# Patient Record
Sex: FEMALE | Race: WHITE | NOT HISPANIC OR LATINO | Employment: FULL TIME | URBAN - METROPOLITAN AREA
[De-identification: names, ages, dates, MRNs, and addresses within clinical notes are randomized per-mention and may not be internally consistent; named-entity substitution may affect disease eponyms.]

---

## 2019-01-02 ENCOUNTER — OFFICE VISIT (OUTPATIENT)
Dept: OBGYN CLINIC | Facility: CLINIC | Age: 29
End: 2019-01-02
Payer: COMMERCIAL

## 2019-01-02 VITALS
HEIGHT: 67 IN | WEIGHT: 258.1 LBS | BODY MASS INDEX: 40.51 KG/M2 | DIASTOLIC BLOOD PRESSURE: 88 MMHG | SYSTOLIC BLOOD PRESSURE: 140 MMHG

## 2019-01-02 DIAGNOSIS — Z78.9 USES BIRTH CONTROL: ICD-10-CM

## 2019-01-02 DIAGNOSIS — Z01.419 ENCOUNTER FOR GYNECOLOGICAL EXAMINATION (GENERAL) (ROUTINE) WITHOUT ABNORMAL FINDINGS: Primary | ICD-10-CM

## 2019-01-02 PROCEDURE — 99385 PREV VISIT NEW AGE 18-39: CPT | Performed by: OBSTETRICS & GYNECOLOGY

## 2019-01-02 RX ORDER — NORGESTIMATE AND ETHINYL ESTRADIOL 0.25-0.035
1 KIT ORAL DAILY
Qty: 84 TABLET | Refills: 3 | Status: SHIPPED | OUTPATIENT
Start: 2019-01-02 | End: 2019-11-15 | Stop reason: SDUPTHER

## 2019-01-02 NOTE — PROGRESS NOTES
ASSESSMENT & PLAN: Diagnoses and all orders for this visit:    Encounter for gynecological examination (general) (routine) without abnormal findings    Uses birth control  -     norgestimate-ethinyl estradiol (ORTHO-CYCLEN) 0 25-35 MG-MCG per tablet; Take 1 tablet by mouth daily         1  Routine well woman exam done today  2  Pap:  The patient's pap is up to date  Pap was not done today  Current ASCCP Guidelines reviewed  3   STD testing was was not done  4   Refill of previfem  5   The following were reviewed in today's visit: use and side effects of OCPs, adequate intake of calcium and vitamin D, exercise, healthy diet and weight loss  6  F/u in 1 year  CC:  Annual Gynecologic Examination    HPI: Matt Daniel is a 33 y o  G0 who presents for annual gynecologic examination  She has the following concerns:  No complaints  Regular menses  Wants to restart birth control pills  Was on previfem in the past     Health Maintenance:    Patient describes her health as good  The last health maintenance visit was 1 years ago  Patient does have weight concerns  She exercises 4 days per week with pilates/yoga and treadmill 1 hr  She does wear her seatbelt routinely  She does perform regular monthly self breast exams  She does feels safe at home  Patients does follow a healthy diet  Patients gets 2 servings of dairy or calcium rich foods a day  Last pap: 1 year ago     There is no problem list on file for this patient  History reviewed  No pertinent past medical history  Past Surgical History:   Procedure Laterality Date    KNEE SURGERY Right     WISDOM TOOTH EXTRACTION         Past OB/Gyn History:  Pt does not have menstrual issues  History of sexually transmitted infection: No   History of abnormal pap smears: No     Patient is currently sexually active  heterosexual and  monogamous  2 months  The current method of family planning is condoms      Family History   Problem Relation Age of Onset    Breast cancer Mother        Social History:  Social History     Social History    Marital status: Single     Spouse name: N/A    Number of children: N/A    Years of education: N/A     Occupational History    Not on file  Social History Main Topics    Smoking status: Never Smoker    Smokeless tobacco: Never Used    Alcohol use No    Drug use: No    Sexual activity: Yes     Partners: Male     Birth control/ protection: Condom Male     Other Topics Concern    Not on file     Social History Narrative    No narrative on file     Presently lives with parents  Patient is currently employed RN in acute rehab    No Known Allergies      Current Outpatient Prescriptions:     norgestimate-ethinyl estradiol (ORTHO-CYCLEN) 0 25-35 MG-MCG per tablet, Take 1 tablet by mouth daily, Disp: 84 tablet, Rfl: 3      Review of Systems  Constitutional :no fever, feels well, no tiredness, no recent weight gain or loss  ENT: no ear ache, no loss of hearing, no nosebleeds or nasal discharge, no sore throat or hoarseness  Cardiovascular: no complaints of slow or fast heart beat, no chest pain, no palpitations, no leg claudication or lower extremity edema  Respiratory: no complaints of shortness of shortness of breath, no ALVARADO  Breasts:no complaints of breast pain, breast lump, or nipple discharge  Gastrointestinal: no complaints of abdominal pain, constipation, nausea, vomiting, or diarrhea or bloody stools  Genitourinary : no complaints of dysuria, incontinence, pelvic pain, no dysmenorrhea, vaginal discharge or abnormal vaginal bleeding and as noted in HPI  Musculoskeletal: no complaints of arthralgia, no myalgia, no joint swelling or stiffness, no limb pain or swelling    Integumentary: no complaints of skin rash or lesion, itching or dry skin  Neurological: no complaints of headache, no confusion, no numbness or tingling, no dizziness or fainting      Physical Exam:   /88   Ht 5' 7" (1 702 m)   Wt 117 kg (258 lb 1 6 oz)   LMP 12/08/2018   Breastfeeding? No   BMI 40 42 kg/m²     General: appears stated age, cooperative, alert normal mood and affect   Psychiatric oriented to person, place and time  Mood and affect normal   Neck: normal, supple,trachea midline, no masses  Thyroid: normal, no thyromegaly   Heart: regular rate and rhythm, S1, S2 normal, no murmur, click, rub or gallop   Lungs: clear to auscultation bilaterally, no increased work of breathing or signs of respiratory distress   Breasts: normal, no dimpling or skin changes noted   Abdomen: soft, non-tender, without masses or organomegaly   Vulva: normal , no lesions   Vagina: normal , no lesions or dryness   Urethra: normal   Urethal meatus normal   Bladder Normal, soft, non-tender and no prolapse or masses appreciated   Cervix: normal, no palpable masses    Uterus: normal , non-tender, not enlarged, no palpable masses   Adnexa: normal, non-tender without fullness or masses   Lymphatic Palpation of lymph nodes in neck, axilla, groin and/or other locations: no lymphadenopathy or masses noted   Skin Normal skin turgor and no rashes    Palpation of skin and subcutaneous tissue normal

## 2019-08-12 ENCOUNTER — APPOINTMENT (OUTPATIENT)
Dept: LAB | Facility: CLINIC | Age: 29
End: 2019-08-12

## 2019-08-12 ENCOUNTER — TRANSCRIBE ORDERS (OUTPATIENT)
Dept: LAB | Facility: CLINIC | Age: 29
End: 2019-08-12

## 2019-08-12 DIAGNOSIS — Z00.8 ENCOUNTER FOR OTHER GENERAL EXAMINATION: ICD-10-CM

## 2019-08-12 DIAGNOSIS — Z00.8 ENCOUNTER FOR OTHER GENERAL EXAMINATION: Primary | ICD-10-CM

## 2019-08-12 LAB
CHOLEST SERPL-MCNC: 239 MG/DL (ref 50–200)
EST. AVERAGE GLUCOSE BLD GHB EST-MCNC: 97 MG/DL
HBA1C MFR BLD: 5 % (ref 4.2–6.3)
HDLC SERPL-MCNC: 78 MG/DL (ref 40–60)
LDLC SERPL CALC-MCNC: 130 MG/DL (ref 0–100)
NONHDLC SERPL-MCNC: 161 MG/DL
TRIGL SERPL-MCNC: 153 MG/DL

## 2019-08-12 PROCEDURE — 83036 HEMOGLOBIN GLYCOSYLATED A1C: CPT

## 2019-08-12 PROCEDURE — 36415 COLL VENOUS BLD VENIPUNCTURE: CPT

## 2019-08-12 PROCEDURE — 80061 LIPID PANEL: CPT

## 2019-10-11 ENCOUNTER — TELEPHONE (OUTPATIENT)
Dept: OBGYN CLINIC | Facility: MEDICAL CENTER | Age: 29
End: 2019-10-11

## 2019-11-15 ENCOUNTER — TELEPHONE (OUTPATIENT)
Dept: OBGYN CLINIC | Facility: MEDICAL CENTER | Age: 29
End: 2019-11-15

## 2019-11-15 DIAGNOSIS — Z78.9 USES BIRTH CONTROL: ICD-10-CM

## 2019-11-18 RX ORDER — NORGESTIMATE AND ETHINYL ESTRADIOL 0.25-0.035
1 KIT ORAL DAILY
Qty: 84 TABLET | Refills: 0 | Status: SHIPPED | OUTPATIENT
Start: 2019-11-18 | End: 2020-01-17 | Stop reason: SDUPTHER

## 2020-01-17 ENCOUNTER — ANNUAL EXAM (OUTPATIENT)
Dept: OBGYN CLINIC | Facility: CLINIC | Age: 30
End: 2020-01-17
Payer: COMMERCIAL

## 2020-01-17 VITALS
SYSTOLIC BLOOD PRESSURE: 140 MMHG | WEIGHT: 272 LBS | DIASTOLIC BLOOD PRESSURE: 98 MMHG | BODY MASS INDEX: 42.69 KG/M2 | HEIGHT: 67 IN

## 2020-01-17 DIAGNOSIS — Z01.419 ENCOUNTER FOR ROUTINE GYNECOLOGICAL EXAMINATION WITH PAPANICOLAOU SMEAR OF CERVIX: Primary | ICD-10-CM

## 2020-01-17 DIAGNOSIS — Z78.9 USES BIRTH CONTROL: ICD-10-CM

## 2020-01-17 PROCEDURE — G0145 SCR C/V CYTO,THINLAYER,RESCR: HCPCS | Performed by: OBSTETRICS & GYNECOLOGY

## 2020-01-17 PROCEDURE — 99395 PREV VISIT EST AGE 18-39: CPT | Performed by: OBSTETRICS & GYNECOLOGY

## 2020-01-17 RX ORDER — NORGESTIMATE AND ETHINYL ESTRADIOL 0.25-0.035
1 KIT ORAL DAILY
Qty: 84 TABLET | Refills: 3 | Status: SHIPPED | OUTPATIENT
Start: 2020-01-17 | End: 2020-12-15 | Stop reason: SDUPTHER

## 2020-01-17 NOTE — PROGRESS NOTES
ASSESSMENT & PLAN: Diagnoses and all orders for this visit:    Encounter for routine gynecological examination with Papanicolaou smear of cervix    Uses birth control  -     norgestimate-ethinyl estradiol ([id number]) 0 25-35 MG-MCG per tablet; Take 1 tablet by mouth daily         1  Routine well woman exam done today  2  Pap:  The patient's pap is not up to date  Pap was done today  Current ASCCP Guidelines reviewed  3   STD testing was done  Plan to check GC/Chl   4   Patient has had her Gardasil vaccination  Recommendations reviewed  5  The following were reviewed in today's visit: adequate intake of calcium and vitamin D, exercise, weight loss and healthy diet  6  F/u in 1 year for next routine gyn exam   7  Refill current birth control pill sent to pharmacy  CC:  Annual Gynecologic Examination    HPI: Virgilio Louise is a 30 y o  who presents for annual gynecologic examination  She has the following concerns: No issues  Recently went to [facility] on vacation  Health Maintenance:    Patient describes her health as good  The last health maintenance visit was 1 years ago  Patient does have weight concerns  She exercises 4 days per week with pilates/yoga and treadmill 1 hr  She does wear her seatbelt routinely  She does perform regular monthly self breast exams  She does feels safe at home  Patients does follow a healthy diet  Patients gets 2 servings of dairy or calcium rich foods a day  Last pap: 2 years ago     There is no problem list on file for this patient  History reviewed  No pertinent past medical history  Past Surgical History:   Procedure Laterality Date    KNEE SURGERY Right     WISDOM TOOTH EXTRACTION         Past OB/Gyn History:  Pt does not have menstrual issues  On OCP's  History of sexually transmitted infection: No   History of abnormal pap smears: No     Patient is currently sexually active  heterosexual and  monogamous  8 months   The current method of family planning is OCP (estrogen/progesterone) and condoms sometimes  Family History   Problem Relation Age of Onset    Breast cancer Mother        Social History:  Social History     Socioeconomic History    Marital status: Single     Spouse name: Not on file    Number of children: Not on file    Years of education: Not on file    Highest education level: Not on file   Occupational History    Not on file   Social Needs    Financial resource strain: Not on file    Food insecurity:     Worry: Not on file     Inability: Not on file    Transportation needs:     Medical: Not on file     Non-medical: Not on file   Tobacco Use    Smoking status: Never Smoker    Smokeless tobacco: Never Used   Substance and Sexual Activity    Alcohol use: No    Drug use: No    Sexual activity: Yes     Partners: Male     Birth control/protection: Condom Male, OCP   Lifestyle    Physical activity:     Days per week: Not on file     Minutes per session: Not on file    Stress: Not on file   Relationships    Social connections:     Talks on phone: Not on file     Gets together: Not on file     Attends Nondenominational service: Not on file     Active member of club or organization: Not on file     Attends meetings of clubs or organizations: Not on file     Relationship status: Not on file    Intimate partner violence:     Fear of current or ex partner: Not on file     Emotionally abused: Not on file     Physically abused: Not on file     Forced sexual activity: Not on file   Other Topics Concern    Not on file   Social History Narrative    Not on file     Presently lives with parents  Patient is currently employed RN in acute rehab  No Known Allergies      Current Outpatient Medications:     norgestimate-ethinyl estradiol (SPRINTEC 28) 0 25-35 MG-MCG per tablet, Take 1 tablet by mouth daily, Disp: 84 tablet, Rfl: 3      Review of Systems  Constitutional :no fever, feels well, no tiredness, recent weight gain    ENT: no ear ache, no loss of hearing, no nosebleeds or nasal discharge, no sore throat or hoarseness  Cardiovascular: no complaints of slow or fast heart beat, no chest pain, no palpitations, no leg claudication or lower extremity edema  Respiratory: no complaints of shortness of shortness of breath, no ALVARADO  Breasts:no complaints of breast pain, breast lump, or nipple discharge  Gastrointestinal: no complaints of abdominal pain, constipation, nausea, vomiting, or diarrhea or bloody stools  Genitourinary : no complaints of dysuria, incontinence, pelvic pain, no dysmenorrhea, vaginal discharge or abnormal vaginal bleeding and as noted in HPI  Musculoskeletal: no complaints of arthralgia, no myalgia, no joint swelling or stiffness, no limb pain or swelling  Integumentary: no complaints of skin rash or lesion, itching or dry skin  Neurological: no complaints of headache, no confusion, no numbness or tingling, no dizziness or fainting      Physical Exam:   /98   Ht 5' 7" (1 702 m)   Wt 123 kg (272 lb)   LMP 12/27/2019 (Exact Date)   BMI 42 60 kg/m²     General: appears stated age, cooperative, alert normal mood and affect   Psychiatric oriented to person, place and time  Mood and affect normal   Neck: normal, supple,trachea midline, no masses    Thyroid: normal, no thyromegaly   Heart: regular rate and rhythm, S1, S2 normal, no murmur, click, rub or gallop   Lungs: clear to auscultation bilaterally, no increased work of breathing or signs of respiratory distress   Breasts: normal, no dimpling or skin changes noted   Abdomen: soft, non-tender, without masses or organomegaly   Vulva: normal , no lesions   Vagina: normal , no lesions or dryness   Urethra: normal   Urethal meatus normal   Bladder Normal, soft, non-tender and no prolapse or masses appreciated   Cervix: normal, no palpable masses    Uterus: normal , non-tender, not enlarged, no palpable masses   Adnexa: normal, non-tender without fullness or masses Lymphatic Palpation of lymph nodes in neck, axilla, groin and/or other locations: no lymphadenopathy or masses noted   Skin Normal skin turgor and no rashes    Palpation of skin and subcutaneous tissue normal

## 2020-01-17 NOTE — PATIENT INSTRUCTIONS
Thank you for your confidence in our team    We appreciate you and welcome your feedback  If you receive a survey from us, please take a few moments to let us know how we are doing     Sincerely,   Gia Dupree MD

## 2020-01-22 LAB
LAB AP GYN PRIMARY INTERPRETATION: NORMAL
Lab: NORMAL

## 2020-01-24 ENCOUNTER — CLINICAL SUPPORT (OUTPATIENT)
Dept: OBGYN CLINIC | Facility: CLINIC | Age: 30
End: 2020-01-24

## 2020-01-24 DIAGNOSIS — Z11.3 SCREENING FOR STD (SEXUALLY TRANSMITTED DISEASE): Primary | ICD-10-CM

## 2020-01-24 PROCEDURE — 87591 N.GONORRHOEAE DNA AMP PROB: CPT | Performed by: OBSTETRICS & GYNECOLOGY

## 2020-01-24 PROCEDURE — 87491 CHLMYD TRACH DNA AMP PROBE: CPT | Performed by: OBSTETRICS & GYNECOLOGY

## 2020-01-24 NOTE — PROGRESS NOTES
Patient presents to provide urine sample to test for G/C, since it was not able to be run with the pap at the time of her annual exam

## 2020-01-25 LAB
C TRACH DNA SPEC QL NAA+PROBE: NEGATIVE
N GONORRHOEA DNA SPEC QL NAA+PROBE: NEGATIVE

## 2020-12-15 DIAGNOSIS — Z78.9 USES BIRTH CONTROL: ICD-10-CM

## 2020-12-15 RX ORDER — NORGESTIMATE AND ETHINYL ESTRADIOL 0.25-0.035
1 KIT ORAL DAILY
Qty: 84 TABLET | Refills: 0 | Status: SHIPPED | OUTPATIENT
Start: 2020-12-15 | End: 2021-02-17 | Stop reason: SDUPTHER

## 2021-02-17 ENCOUNTER — ANNUAL EXAM (OUTPATIENT)
Dept: OBGYN CLINIC | Facility: CLINIC | Age: 31
End: 2021-02-17
Payer: COMMERCIAL

## 2021-02-17 VITALS
WEIGHT: 266 LBS | HEIGHT: 67 IN | SYSTOLIC BLOOD PRESSURE: 142 MMHG | DIASTOLIC BLOOD PRESSURE: 90 MMHG | BODY MASS INDEX: 41.75 KG/M2

## 2021-02-17 DIAGNOSIS — Z01.419 ENCOUNTER FOR GYNECOLOGICAL EXAMINATION (GENERAL) (ROUTINE) WITHOUT ABNORMAL FINDINGS: Primary | ICD-10-CM

## 2021-02-17 DIAGNOSIS — Z78.9 RUBELLA IMMUNE STATUS NOT KNOWN: ICD-10-CM

## 2021-02-17 DIAGNOSIS — Z78.9 USES BIRTH CONTROL: ICD-10-CM

## 2021-02-17 PROCEDURE — 99395 PREV VISIT EST AGE 18-39: CPT | Performed by: OBSTETRICS & GYNECOLOGY

## 2021-02-17 RX ORDER — NORGESTIMATE AND ETHINYL ESTRADIOL 0.25-0.035
1 KIT ORAL DAILY
Qty: 84 TABLET | Refills: 3 | Status: SHIPPED | OUTPATIENT
Start: 2021-02-17 | End: 2021-11-19

## 2021-02-17 NOTE — PATIENT INSTRUCTIONS
Planning for Pregnancy   WHAT YOU NEED TO KNOW:   Why is it important to plan for pregnancy? There are things you can do to get your body ready for a healthy pregnancy  A healthy pregnancy can improve your chance of having a healthy baby  The steps you need to take and the amount of time needed depends on your current health and habits  Work with your healthcare provider to help you plan a healthy pregnancy  What do I need to know about nutrition and exercise before pregnancy? · Eat a variety of healthy foods  Healthy foods include fruits, vegetables, whole-grain breads, low-fat dairy foods, beans, lean meats, and fish  Limit foods high in sugar, fat, and sodium  Limit your intake of fish to 2 servings each week  Choose fish low in mercury such as canned light tuna, shrimp, salmon, cod, or tilapia  Do not  eat fish high in mercury such as swordfish, tilefish, mony mackerel, and shark  · Take 400 micrograms (mcg) of folic acid each day  This will help to prevent birth defects of the brain and spine such as spina bifida  Most women should take folic acid before pregnancy and up to 12 weeks after getting pregnant  · Exercise for at least 30 minutes, 5 days a week  Some examples of exercise include walking, biking, dancing, and swimming  Include muscle strengthening activities 2 days each week  Regular exercise provides many health benefits  It helps you manage your weight, and decreases your risk for type 2 diabetes, heart disease, stroke, and high blood pressure  Exercise can also help improve your mood  Ask your healthcare provider about the best exercise plan for you  How does weight affect pregnancy? · Obesity  can make it harder for you to get pregnant  It also increases your risk of health problems during pregnancy  Some of these health problems include gestational diabetes, high blood pressure, and infections   It can also increase your baby's risk of health problems such as birth defects  Your baby may also be large and harder to deliver or be born prematurely (early)  Your risk of miscarriage is also higher if you are obese  Work with your healthcare provider to reach a healthy weight before you try to get pregnant  · Being underweight  can also make it hard for you to get pregnant  It can also increase your risk of having a premature baby and miscarriage  Your baby may be born at a low birth weight  What do I need to know about smoking, alcohol, and drugs? · Smoking  increases your risk of a miscarriage and other health problems during pregnancy  Smoking can cause your baby to be born too early or weigh less at birth  Ask your healthcare provider for information if you need help quitting  · Alcohol  passes from your body to your baby through the placenta  It can affect your baby's brain development and cause fetal alcohol syndrome (FAS)  FAS is a group of conditions that causes mental, behavior, and growth problems  Talk to your healthcare provider if you abuse alcohol and need help quitting before pregnancy  · Drugs , such as marijuana and cocaine, should not be used while you are trying to get pregnant or during pregnancy  They increase your risk of problems during pregnancy and increase the risk of having a baby with health problems  These include birth defects, premature birth, and infant death  What do I need to know about medicines and supplements? Tell your healthcare provider about all the medicines and supplements you take  Certain medicines and supplements should not be used during pregnancy  These include over-the-counter medicines, prescription medicines, vitamins, and herbal supplements  He or she may recommend that you take different medicines that are safer during pregnancy  What do I need to know about immunizations? Tell your healthcare provider about all the immunizations you have had   If you have missed any immunizations, your healthcare provider may recommend that you update your immunizations  These include hepatitis B, influenza, MMR (measles, mumps, rubella), Tdap, and varicella immunizations  What tests may I need to have before pregnancy? Your healthcare provider may recommend that you have tests to screen for sexually transmitted infections  These include chlamydia, gonorrhea, herpes, HIV infection, syphilis, and tuberculosis  These infectious diseases should be treated before pregnancy, if needed  What do I need to know about toxic substances? Toxic substances can harm a developing baby  Examples include cleaning products, paints, solvents, pesticides, and other chemical products  They can increase the risk of having a miscarriage, premature birth, and low-birth weight baby  They also increase the risk of developmental delay and childhood cancer  Avoid exposure to toxic substances and materials at work and home  What do I need to know about genetic testing? Tell your healthcare provider about your and your partner's family history of genetic disorders, developmental delays, or other disabilities  Also tell your healthcare provider about any problems you have had in previous pregnancies  Your healthcare provider may recommend that you see a healthcare professional called a genetic counselor  He or she will talk to you about how genes, birth defects, and other medical conditions are passed down  He or she can also tell you about your risk for passing a genetic disease in a future pregnancy  How can I prepare for pregnancy if I have a medical condition? Medical conditions such as diabetes, high blood pressure, asthma, seizure disorders, and thyroid disorders should be managed before pregnancy  Mental health conditions, such as depression and anxiety, should also be treated  This will decrease your risk of having health problems during pregnancy  It will also decrease your baby's risk of medical problems   Medicines used to treat certain conditions are not safe to use during pregnancy and may need to be changed before you get pregnant  Ask your healthcare provider if it is safe for you to get pregnant if you have a medical condition  CARE AGREEMENT:   You have the right to help plan your care  Learn about your health condition and how it may be treated  Discuss treatment options with your healthcare providers to decide what care you want to receive  You always have the right to refuse treatment  The above information is an  only  It is not intended as medical advice for individual conditions or treatments  Talk to your doctor, nurse or pharmacist before following any medical regimen to see if it is safe and effective for you  © Copyright [address] Information is for End User's use only and may not be sold, redistributed or otherwise used for commercial purposes   All illustrations and images included in CareNotes® are the copyrighted property of [employer]  or [address] [place] [address]

## 2021-02-17 NOTE — PROGRESS NOTES
ASSESSMENT & PLAN: Diagnoses and all orders for this visit:    Encounter for gynecological examination (general) (routine) without abnormal findings    Uses birth control  -     norgestimate-ethinyl estradiol (Sprintec 28) 0 25-35 MG-MCG per tablet; Take 1 tablet by mouth daily    Rubella immune status not known  -     Rubella antibody, IgG; Future         1  Routine well woman exam done today  2  Pap:  The patient's pap is up to date  Pap was not done today  Current ASCCP Guidelines reviewed  3   STD testing was not done  Tested in 2020  4   Patient has had her Gardasil vaccination  Recommendations reviewed  5  The following were reviewed in today's visit: adequate intake of calcium and vitamin D, exercise and healthy diet  6  F/u in 1 year for next routine gyn exam   7  Discussed general prepregnancy recommendations  Pt to get Rubella IgG drawn  Will also start charting menses once she stops her bcp's and starts trying to conceive  CC:  Annual Gynecologic Examination    HPI: Snehal Barnett is a 35 y o  who presents for annual gynecologic examination  She has the following concerns: No issues  Is getting  in October  Has lost some weight with intermittent fasting  Is going to start new diet macros and portion control diet  Is considering getting pregnant after wedding  Health Maintenance:    Patient describes her health as good  The last health maintenance visit was 1 years ago  Patient does have weight concerns  She hasn't been able to exercise at the gym because of COVID; tries to stay active  She does wear her seatbelt routinely  She does perform regular monthly self breast exams  She does feels safe at home  Patients does follow a healthy diet  Patients gets 2 servings of dairy or calcium rich foods a day  Last pap: 1 year ago     There is no problem list on file for this patient  History reviewed  No pertinent past medical history      Past Surgical History:   Procedure Laterality Date    KNEE SURGERY Right     WISDOM TOOTH EXTRACTION         Past OB/Gyn History:  Pt does not have menstrual issues  On OCP's  History of sexually transmitted infection: No   History of abnormal pap smears: No     Patient is currently sexually active  heterosexual  The current method of family planning is OCP (estrogen/progesterone)  Family History   Problem Relation Age of Onset    Breast cancer Mother        Social History:  Social History     Socioeconomic History    Marital status: Single     Spouse name: Not on file    Number of children: Not on file    Years of education: Not on file    Highest education level: Not on file   Occupational History    Not on file   Social Needs    Financial resource strain: Not on file    Food insecurity     Worry: Not on file     Inability: Not on file    Transportation needs     Medical: Not on file     Non-medical: Not on file   Tobacco Use    Smoking status: Never Smoker    Smokeless tobacco: Never Used   Substance and Sexual Activity    Alcohol use: No    Drug use: No    Sexual activity: Yes     Partners: Male   Lifestyle    Physical activity     Days per week: Not on file     Minutes per session: Not on file    Stress: Not on file   Relationships    Social connections     Talks on phone: Not on file     Gets together: Not on file     Attends Confucianist service: Not on file     Active member of club or organization: Not on file     Attends meetings of clubs or organizations: Not on file     Relationship status: Not on file    Intimate partner violence     Fear of current or ex partner: Not on file     Emotionally abused: Not on file     Physically abused: Not on file     Forced sexual activity: Not on file   Other Topics Concern    Not on file   Social History Narrative    Not on file     Presently lives with inez, recently moved into a new house    Patient is currently employed RN in acute rehab    No Known Allergies      Current Outpatient Medications:     norgestimate-ethinyl estradiol (Sprintec 28) 0 25-35 MG-MCG per tablet, Take 1 tablet by mouth daily, Disp: 84 tablet, Rfl: 3      Review of Systems  Constitutional :no fever, feels well, no tiredness, no recent weight gain or loss  ENT: no ear ache, no loss of hearing, no nosebleeds or nasal discharge, no sore throat or hoarseness  Cardiovascular: no complaints of slow or fast heart beat, no chest pain, no palpitations, no leg claudication or lower extremity edema  Respiratory: no complaints of shortness of shortness of breath, no ALVARADO  Breasts:no complaints of breast pain, breast lump, or nipple discharge  Gastrointestinal: no complaints of abdominal pain, constipation, nausea, vomiting, or diarrhea or bloody stools  Genitourinary : no complaints of dysuria, incontinence, pelvic pain, no dysmenorrhea, vaginal discharge or abnormal vaginal bleeding and as noted in HPI  Musculoskeletal: no complaints of arthralgia, no myalgia, no joint swelling or stiffness, no limb pain or swelling  Integumentary: no complaints of skin rash or lesion, itching or dry skin  Neurological: no complaints of headache, no confusion, no numbness or tingling, no dizziness or fainting      Physical Exam:   /90   Ht 5' 7" (1 702 m)   Wt 121 kg (266 lb)   LMP 02/02/2021   BMI 41 66 kg/m²     General: appears stated age, cooperative, alert normal mood and affect   Psychiatric oriented to person, place and time  Mood and affect normal   Neck: normal, supple,trachea midline, no masses    Thyroid: normal, no thyromegaly   Heart: regular rate and rhythm, S1, S2 normal, no murmur, click, rub or gallop   Lungs: clear to auscultation bilaterally, no increased work of breathing or signs of respiratory distress   Breasts: normal, no dimpling or skin changes noted   Abdomen: soft, non-tender, without masses or organomegaly   Vulva: normal , no lesions   Vagina: normal , no lesions or dryness   Urethra: normal   Urethal meatus normal   Bladder Normal, soft, non-tender and no prolapse or masses appreciated   Cervix: normal, no palpable masses    Uterus: normal , non-tender, not enlarged, no palpable masses   Adnexa: normal, non-tender without fullness or masses   Lymphatic Palpation of lymph nodes in neck, axilla, groin and/or other locations: no lymphadenopathy or masses noted   Skin Normal skin turgor and no rashes    Palpation of skin and subcutaneous tissue normal

## 2021-02-19 ENCOUNTER — LAB (OUTPATIENT)
Dept: LAB | Facility: HOSPITAL | Age: 31
End: 2021-02-19
Payer: COMMERCIAL

## 2021-02-19 DIAGNOSIS — Z78.9 RUBELLA IMMUNE STATUS NOT KNOWN: ICD-10-CM

## 2021-02-19 LAB — RUBV IGG SERPL IA-ACNC: 98 IU/ML

## 2021-02-19 PROCEDURE — 86762 RUBELLA ANTIBODY: CPT

## 2021-02-19 PROCEDURE — 36415 COLL VENOUS BLD VENIPUNCTURE: CPT

## 2021-03-12 ENCOUNTER — TELEMEDICINE (OUTPATIENT)
Dept: FAMILY MEDICINE CLINIC | Facility: CLINIC | Age: 31
End: 2021-03-12
Payer: COMMERCIAL

## 2021-03-12 DIAGNOSIS — Z20.822 SUSPECTED COVID-19 VIRUS INFECTION: Primary | ICD-10-CM

## 2021-03-12 PROCEDURE — 99441 PR PHYS/QHP TELEPHONE EVALUATION 5-10 MIN: CPT | Performed by: FAMILY MEDICINE

## 2021-03-12 NOTE — PROGRESS NOTES
COVID-19 Virtual Visit     Assessment/Plan:    Problem List Items Addressed This Visit        Other    Suspected COVID-19 virus infection - Primary     Symptom Start: 3/11/2021  Potential Isolation Completion Date: 3/21/2021    - Order COVID swab, recommend testing on 3/15/2021 as that will be the 5th day since symptom onset  - Recommend increased PO hydration  - Tylenol PRN  - ED precautions         Relevant Orders    Novel Coronavirus (Covid-19),PCR [facility] - Collected at [facility] or [facility]         Disposition:     I recommended self-quarantine for 10 days and to watch for symptoms until 14 days after exposure  If patient were to develop symptoms, they should self isolate and call our office for further guidance  I referred patient to one of our centralized sites for a COVID-19 swab  Symptom Start: 3/11/2021  Potential Isolation Completion Date: 3/21/2021    - Order COVID swab  - Recommend increased PO hydration  - Tylenol PRN  - ED precautions    I have spent 8 minutes directly with the patient  Greater than 50% of this time was spent in counseling/coordination of care regarding: instructions for management  Encounter provider Lorenzo Sullivan MD    Provider located at [address] [place] [address]  [phone]    Recent Visits  No visits were found meeting these conditions  Showing recent visits within past 7 days and meeting all other requirements     Today's Visits  Date Type Provider Dept   03/12/21 Telemedicine Lorenzo Sullivan MD [address] today's visits and meeting all other requirements     Future Appointments  No visits were found meeting these conditions  Showing future appointments within next 150 days and meeting all other requirements        Patient agrees to participate in a virtual check in via telephone or video visit instead of presenting to the office to address urgent/immediate medical needs  Patient is aware this is a billable service  After connecting through Telephone, the patient was identified by name and date of birth  Katiana Rowland was informed that this was a telemedicine visit and that the exam was being conducted confidentially over secure lines  My office door was closed  No one else was in the room  Zee Putnam acknowledged consent and understanding of privacy and security of the telemedicine visit  I informed the patient that I have reviewed her record in Epic and presented the opportunity for her to ask any questions regarding the visit today  The patient agreed to participate  Subjective:   Katiana Rowland is a 35 y o  female who is concerned about COVID-19  Patient's symptoms include sore throat and cough  Patient denies fever, fatigue, congestion, rhinorrhea, anosmia, loss of taste, shortness of breath, abdominal pain, nausea, vomiting, diarrhea and headaches  Date of symptom onset: 3/11/2021    Exposure:   Contact with a person who is under investigation (PUI) for or who is positive for COVID-19 within the last 14 days?: Yes    Hospitalized recently for fever and/or lower respiratory symptoms?: No      Currently a healthcare worker that is involved in direct patient care?: Yes      Works in a special setting where the risk of COVID-19 transmission may be high? (this may include long-term care, correctional and assisted facilities; homeless shelters; assisted-living facilities and group homes ): No      Resident in a special setting where the risk of COVID-19 transmission may be high? (this may include long-term care, correctional and assisted facilities; homeless shelters; assisted-living facilities and group homes ): No      Patient presents via telemedicine for above mentioned symptoms  [facility] recently tested positive for COVID  No results found for: Lis Makcenzie Scale  No past medical history on file    Past Surgical History:   Procedure Laterality Date    KNEE SURGERY Right     WISDOM TOOTH EXTRACTION       Current Outpatient Medications   Medication Sig Dispense Refill    norgestimate-ethinyl estradiol (Sprintec 28) 0 25-35 MG-MCG per tablet Take 1 tablet by mouth daily 84 tablet 3     No current facility-administered medications for this visit  No Known Allergies    Review of Systems   Constitutional: Negative for fatigue and fever  HENT: Positive for sore throat  Negative for congestion and rhinorrhea  Respiratory: Positive for cough  Negative for shortness of breath  Gastrointestinal: Negative for abdominal pain, diarrhea, nausea and vomiting  Neurological: Negative for headaches  Objective: There were no vitals filed for this visit  Physical Exam   Patient was able to speak in complete sentences without additional effort  VIRTUAL VISIT DISCLAIMER    Zee Disla acknowledges that she has consented to an online visit or consultation  She understands that the online visit is based solely on information provided by her, and that, in the absence of a face-to-face physical evaluation by the physician, the diagnosis she receives is both limited and provisional in terms of accuracy and completeness  This is not intended to replace a full medical face-to-face evaluation by the physician  Zee Disla understands and accepts these terms

## 2021-03-12 NOTE — ASSESSMENT & PLAN NOTE
Symptom Start: 3/11/2021  Potential Isolation Completion Date: 3/21/2021    - Order COVID swab, recommend testing on 3/15/2021 as that will be the 5th day since symptom onset  - Recommend increased PO hydration  - Tylenol PRN  - ED precautions

## 2021-03-15 DIAGNOSIS — Z20.822 SUSPECTED COVID-19 VIRUS INFECTION: ICD-10-CM

## 2021-03-15 PROCEDURE — U0005 INFEC AGEN DETEC AMPLI PROBE: HCPCS | Performed by: FAMILY MEDICINE

## 2021-03-15 PROCEDURE — U0003 INFECTIOUS AGENT DETECTION BY NUCLEIC ACID (DNA OR RNA); SEVERE ACUTE RESPIRATORY SYNDROME CORONAVIRUS 2 (SARS-COV-2) (CORONAVIRUS DISEASE [COVID-19]), AMPLIFIED PROBE TECHNIQUE, MAKING USE OF HIGH THROUGHPUT TECHNOLOGIES AS DESCRIBED BY CMS-2020-01-R: HCPCS | Performed by: FAMILY MEDICINE

## 2021-03-17 LAB — SARS-COV-2 RNA RESP QL NAA+PROBE: POSITIVE

## 2021-03-19 ENCOUNTER — TELEMEDICINE (OUTPATIENT)
Dept: FAMILY MEDICINE CLINIC | Facility: CLINIC | Age: 31
End: 2021-03-19
Payer: COMMERCIAL

## 2021-03-19 DIAGNOSIS — U07.1 COVID-19 VIRUS INFECTION: Primary | ICD-10-CM

## 2021-03-19 PROCEDURE — 99441 PR PHYS/QHP TELEPHONE EVALUATION 5-10 MIN: CPT | Performed by: FAMILY MEDICINE

## 2021-03-19 NOTE — LETTER
March 19, 2021     Patient: Viridiana Fairchild   YOB: 1990   Date of Visit: 3/19/2021       To Whom it May Concern:    Americaedmond Brunner is under my professional care  She was seen in my office on 3/19/2021  She may return to work on 3/22/2021  If you have any questions or concerns, please don't hesitate to call           Sincerely,          Lucila Landers MD        CC: No Recipients

## 2021-03-19 NOTE — PROGRESS NOTES
COVID-19 Virtual Visit     Assessment/Plan:    Problem List Items Addressed This Visit        Other    COVID-19 virus infection - Primary         Disposition:     I recommended continued isolation until at least 24 hours have passed since recovery defined as resolution of fever without the use of fever-reducing medications AND improvement in COVID symptoms AND 10 days have passed since onset of symptoms (or 10 days have passed since date of first positive viral diagnostic test for asymptomatic patients)  Symptom start: 3/11/21  Isolation discontinuation: 3/22/21    - Work note given to return 3/22 as long as she continues to do well and meets above criteria  - Increase PO hydration    I have spent 6 minutes directly with the patient  Greater than 50% of this time was spent in counseling/coordination of care regarding: instructions for management  Encounter provider Marvin Suarez MD    Provider located at [address] [place] [id number] [phone]    Recent Visits  Date Type Provider Dept   03/12/21 Telemedicine Marvin Suarez, [address] recent visits within past 7 days and meeting all other requirements     Today's Visits  Date Type Provider Dept   03/19/21 Telemedicine Marvin Suarez MD [address] today's visits and meeting all other requirements     Future Appointments  No visits were found meeting these conditions  Showing future appointments within next 150 days and meeting all other requirements        Patient agrees to participate in a virtual check in via telephone or video visit instead of presenting to the office to address urgent/immediate medical needs  Patient is aware this is a billable service  After connecting through Telephone, the patient was identified by name and date of birth   Aparna Stark was informed that this was a telemedicine visit and that the exam was being conducted confidentially over secure lines  My office door was closed  No one else was in the room  Zee Davenport acknowledged consent and understanding of privacy and security of the telemedicine visit  I informed the patient that I have reviewed her record in Epic and presented the opportunity for her to ask any questions regarding the visit today  The patient agreed to participate  It was my intent to perform this visit via video technology but the patient was not able to do a video connection so the visit was completed via audio telephone only  Subjective:   Rufino Fowler is a 35 y o  female who has been screened for COVID-19  Symptom change since last report: resolving  Patient's symptoms include cough  Patient denies fever, fatigue, rhinorrhea, sore throat, anosmia, loss of taste, shortness of breath, nausea, diarrhea, myalgias and headaches  Zee has been staying home and has isolated themselves in her home  She is taking care to not share personal items and is cleaning all surfaces that are touched often, like counters, tabletops, and doorknobs using household cleaning sprays or wipes  She is wearing a mask when she leaves her room  Date of symptom onset: 3/11/2021  Date of positive COVID-19 PCR: 3/15/2021    Patient states she is doing much better  Still has a slight cough  Denies fevers of SOB  Requires work note to return 3/22  Lab Results   Component Value Date    SARSCOV2 Positive (A) 03/15/2021     No past medical history on file  Past Surgical History:   Procedure Laterality Date    KNEE SURGERY Right     WISDOM TOOTH EXTRACTION       Current Outpatient Medications   Medication Sig Dispense Refill    norgestimate-ethinyl estradiol (Sprintec 28) 0 25-35 MG-MCG per tablet Take 1 tablet by mouth daily 84 tablet 3     No current facility-administered medications for this visit        No Known Allergies    Review of Systems   Constitutional: Negative for fatigue and fever    HENT: Negative for rhinorrhea and sore throat  Respiratory: Positive for cough  Negative for shortness of breath  Gastrointestinal: Negative for diarrhea and nausea  Musculoskeletal: Negative for myalgias  Neurological: Negative for headaches  Objective: There were no vitals filed for this visit  Physical Exam   Patient was able to speak in full sentences without additional effort  VIRTUAL VISIT DISCLAIMER    Zee He acknowledges that she has consented to an online visit or consultation  She understands that the online visit is based solely on information provided by her, and that, in the absence of a face-to-face physical evaluation by the physician, the diagnosis she receives is both limited and provisional in terms of accuracy and completeness  This is not intended to replace a full medical face-to-face evaluation by the physician  Zee He understands and accepts these terms

## 2021-03-31 DIAGNOSIS — Z23 ENCOUNTER FOR IMMUNIZATION: ICD-10-CM

## 2021-05-27 ENCOUNTER — APPOINTMENT (OUTPATIENT)
Dept: LAB | Facility: HOSPITAL | Age: 31
End: 2021-05-27
Payer: COMMERCIAL

## 2021-05-27 ENCOUNTER — TRANSCRIBE ORDERS (OUTPATIENT)
Dept: LAB | Facility: HOSPITAL | Age: 31
End: 2021-05-27

## 2021-05-27 DIAGNOSIS — Z00.8 HEALTH EXAMINATION IN POPULATION SURVEY: Primary | ICD-10-CM

## 2021-05-27 DIAGNOSIS — Z00.8 HEALTH EXAMINATION IN POPULATION SURVEY: ICD-10-CM

## 2021-05-27 LAB
CHOLEST SERPL-MCNC: 236 MG/DL (ref 0–200)
EST. AVERAGE GLUCOSE BLD GHB EST-MCNC: 100 MG/DL
HBA1C MFR BLD: 5.1 %
HDLC SERPL-MCNC: 80 MG/DL
LDLC SERPL CALC-MCNC: 139 MG/DL (ref 0–100)
NONHDLC SERPL-MCNC: 156 MG/DL
TRIGL SERPL-MCNC: 84 MG/DL (ref 44–166)

## 2021-05-27 PROCEDURE — 80061 LIPID PANEL: CPT

## 2021-05-27 PROCEDURE — 36415 COLL VENOUS BLD VENIPUNCTURE: CPT

## 2021-05-27 PROCEDURE — 83036 HEMOGLOBIN GLYCOSYLATED A1C: CPT

## 2021-06-12 ENCOUNTER — IMMUNIZATIONS (OUTPATIENT)
Dept: FAMILY MEDICINE CLINIC | Facility: HOSPITAL | Age: 31
End: 2021-06-12

## 2021-06-12 DIAGNOSIS — Z23 ENCOUNTER FOR IMMUNIZATION: Primary | ICD-10-CM

## 2021-06-12 PROCEDURE — 0001A SARS-COV-2 / COVID-19 MRNA VACCINE (PFIZER-BIONTECH) 30 MCG: CPT

## 2021-06-12 PROCEDURE — 91300 SARS-COV-2 / COVID-19 MRNA VACCINE (PFIZER-BIONTECH) 30 MCG: CPT

## 2021-07-06 ENCOUNTER — IMMUNIZATIONS (OUTPATIENT)
Dept: FAMILY MEDICINE CLINIC | Facility: HOSPITAL | Age: 31
End: 2021-07-06

## 2021-07-06 DIAGNOSIS — Z23 ENCOUNTER FOR IMMUNIZATION: Primary | ICD-10-CM

## 2021-07-06 PROCEDURE — 0002A SARS-COV-2 / COVID-19 MRNA VACCINE (PFIZER-BIONTECH) 30 MCG: CPT

## 2021-07-06 PROCEDURE — 91300 SARS-COV-2 / COVID-19 MRNA VACCINE (PFIZER-BIONTECH) 30 MCG: CPT

## 2021-08-20 ENCOUNTER — OFFICE VISIT (OUTPATIENT)
Dept: FAMILY MEDICINE CLINIC | Facility: CLINIC | Age: 31
End: 2021-08-20
Payer: COMMERCIAL

## 2021-08-20 VITALS
WEIGHT: 243.8 LBS | HEIGHT: 67 IN | SYSTOLIC BLOOD PRESSURE: 120 MMHG | HEART RATE: 127 BPM | DIASTOLIC BLOOD PRESSURE: 84 MMHG | OXYGEN SATURATION: 99 % | BODY MASS INDEX: 38.27 KG/M2

## 2021-08-20 DIAGNOSIS — Z00.00 ANNUAL PHYSICAL EXAM: Primary | ICD-10-CM

## 2021-08-20 PROCEDURE — 99395 PREV VISIT EST AGE 18-39: CPT | Performed by: NURSE PRACTITIONER

## 2021-08-20 NOTE — PROGRESS NOTES
ADULT ANNUAL [address] PRIMARY CARE    NAME: Rebekah Bernstein  AGE: 28 y o  SEX: female  : 1990     DATE: 2021     Assessment and Plan:     Problem List Items Addressed This Visit     None      Visit Diagnoses     Annual physical exam    -  Primary    BMI 38 0-38 9,adult              Immunizations and preventive care screenings were discussed with patient today  Appropriate education was printed on patient's after visit summary  Counseling:  · Exercise: the importance of regular exercise/physical activity was discussed  Recommend exercise 3-5 times per week for at least 30 minutes  BMI Counseling: Body mass index is 38 18 kg/m²  The BMI is above normal  Nutrition recommendations include encouraging healthy choices of fruits and vegetables  Exercise recommendations include moderate physical activity 150 minutes/week  Reviewed previously drawn blood work, discussed elevated cholesterol levels - her triglyceride levels have improved since 2019 - has started a new diet recently and has lost some weight  Return in 1 year (on 2022)  Chief Complaint:     Chief Complaint   Patient presents with    Annual Exam     NP      History of Present Illness:     Adult Annual Physical   Patient here for a comprehensive physical exam  The patient reports no problems  Diet and Physical Activity  · Diet/Nutrition: well balanced diet  · Exercise: moderate cardiovascular exercise  Depression Screening  PHQ-9 Depression Screening    PHQ-9:   Frequency of the following problems over the past two weeks:      Little interest or pleasure in doing things: 0 - not at all  Feeling down, depressed, or hopeless: 0 - not at all  PHQ-2 Score: 0       General Health  · Sleep: sleeps well  · Hearing: normal - bilateral   · Vision: no vision problems, most recent eye exam >1 year ago and wears glasses  · Dental: regular dental visits  /GYN Health  · Last menstrual period: monthly  · Contraceptive method: n/a   · History of STDs?: no      Review of Systems:     Review of Systems   Constitutional: Negative  Respiratory: Negative  Cardiovascular: Negative  Neurological: Negative  All other systems reviewed and are negative  Past Medical History:     History reviewed  No pertinent past medical history  Past Surgical History:     Past Surgical History:   Procedure Laterality Date    KNEE SURGERY Right     WISDOM TOOTH EXTRACTION        Social History:     Social History     Socioeconomic History    Marital status: Single     Spouse name: None    Number of children: None    Years of education: None    Highest education level: None   Occupational History    None   Tobacco Use    Smoking status: Never Smoker    Smokeless tobacco: Never Used   Vaping Use    Vaping Use: Never used   Substance and Sexual Activity    Alcohol use: No    Drug use: No    Sexual activity: Yes     Partners: Male   Other Topics Concern    None   Social History Narrative    None     Social Determinants of Health     Financial Resource Strain:     Difficulty of Paying Living Expenses:    Food Insecurity:     Worried About Running Out of Food in the Last Year:     Ran Out of Food in the Last Year:    Transportation Needs:     Lack of Transportation (Medical):      Lack of Transportation (Non-Medical):    Physical Activity:     Days of Exercise per Week:     Minutes of Exercise per Session:    Stress:     Feeling of Stress :    Social Connections:     Frequency of Communication with Friends and Family:     Frequency of Social Gatherings with Friends and Family:     Attends Restorationism Services:     Active Member of Clubs or Organizations:     Attends Club or Organization Meetings:     Marital Status:    Intimate Partner Violence:     Fear of Current or Ex-Partner:     Emotionally Abused:     Physically Abused:     Sexually Abused: Family History:     Family History   Problem Relation Age of Onset    Breast cancer Mother       Current Medications:     Current Outpatient Medications   Medication Sig Dispense Refill    norgestimate-ethinyl estradiol (Sprintec 28) 0 25-35 MG-MCG per tablet Take 1 tablet by mouth daily 84 tablet 3     No current facility-administered medications for this visit  Allergies:     No Known Allergies   Physical Exam:     /84 (BP Location: Right arm, Patient Position: Sitting, Cuff Size: Large)   Pulse (!) 127   Ht 5' 7" (1 702 m)   Wt 111 kg (243 lb 12 8 oz)   SpO2 99%   BMI 38 18 kg/m²     Physical Exam  Vitals and nursing note reviewed  Constitutional:       Appearance: Normal appearance  HENT:      Head: Normocephalic and atraumatic  Eyes:      Extraocular Movements: Extraocular movements intact  Conjunctiva/sclera: Conjunctivae normal       Pupils: Pupils are equal, round, and reactive to light  Cardiovascular:      Rate and Rhythm: Normal rate and regular rhythm  Heart sounds: No murmur heard  No gallop  Pulmonary:      Effort: Pulmonary effort is normal  No respiratory distress  Breath sounds: Normal breath sounds  No wheezing or rales  Musculoskeletal:         General: Normal range of motion  Neurological:      General: No focal deficit present  Mental Status: She is alert and oriented to person, place, and time  Mental status is at baseline  Psychiatric:         Mood and Affect: Mood normal          Behavior: Behavior normal          Thought Content:  Thought content normal          Judgment: Judgment normal           OPHELIA Smith   [facility] PRIMARY CARE

## 2021-08-20 NOTE — PATIENT INSTRUCTIONS
You may receive a survey in the mail, or by e-mail, please fill it out COMPLETELY, and let us know how we did! Please remember to sign up for your iRewardChart soham to check you lab results, send us messages, and schedule appointments  If you have questions about the iRewardChart option, please call us! Thank you again for choosing [facility]! Wellness Visit for Adults   AMBULATORY CARE:   A wellness visit  is when you see your healthcare provider to get screened for health problems  Your healthcare provider will also give you advice on how to stay healthy  Write down your questions so you remember to ask them  Ask your healthcare provider how often you should have a wellness visit  What happens at a wellness visit:  Your healthcare provider will ask about your health, and your family history of health problems  This includes high blood pressure, heart disease, and cancer  He or she will ask if you have symptoms that concern you, if you smoke, and about your mood  You may also be asked about your intake of medicines, supplements, food, and alcohol  Any of the following may be done:  · Your weight  will be checked  Your height may also be checked so your body mass index (BMI) can be calculated  Your BMI shows if you are at a healthy weight  · Your blood pressure  and heart rate will be checked  Your temperature may also be checked  · Blood and urine tests  may be done  Blood tests may be done to check your cholesterol levels  Abnormal cholesterol levels increase your risk for heart disease and stroke  You may also need a blood or urine test to check for diabetes if you are at increased risk  Urine tests may be done to look for signs of an infection or kidney disease  · A physical exam  includes checking your heartbeat and lungs with a stethoscope  Your healthcare provider may also check your skin to look for sun damage  · Screening tests  may be recommended   A screening test is done to check for diseases that may not cause symptoms  The screening tests you may need depend on your age, gender, family history, and lifestyle habits  For example, colorectal screening may be recommended if you are 48years old or older  Screening tests you need if you are a woman:   · A Pap smear  is used to screen for cervical cancer  Pap smears are usually done every 3 to 5 years depending on your age  You may need them more often if you have had abnormal Pap smear test results in the past  Ask your healthcare provider how often you should have a Pap smear  · A mammogram  is an x-ray of your breasts to screen for breast cancer  Experts recommend mammograms every 2 years starting at age 48 years  You may need a mammogram at age 52 years or younger if you have an increased risk for breast cancer  Talk to your healthcare provider about when you should start having mammograms and how often you need them  Vaccines you may need:   · Get an influenza vaccine  every year  The influenza vaccine protects you from the flu  Several types of viruses cause the flu  The viruses change over time, so new vaccines are made each year  · Get a tetanus-diphtheria (Td) booster vaccine  every 10 years  This vaccine protects you against tetanus and diphtheria  Tetanus is a severe infection that may cause painful muscle spasms and lockjaw  Diphtheria is a severe bacterial infection that causes a thick covering in the back of your mouth and throat  · Get a human papillomavirus (HPV) vaccine  if you are female and aged 23 to 32 or male 23 to 24 and never received it  This vaccine protects you from HPV infection  HPV is the most common infection spread by sexual contact  HPV may also cause vaginal, penile, and anal cancers  · Get a pneumococcal vaccine  if you are aged 72 years or older  The pneumococcal vaccine is an injection given to protect you from pneumococcal disease   Pneumococcal disease is an infection caused by pneumococcal bacteria  The infection may cause pneumonia, meningitis, or an ear infection  · Get a shingles vaccine  if you are 60 or older, even if you have had shingles before  The shingles vaccine is an injection to protect you from the varicella-zoster virus  This is the same virus that causes chickenpox  Shingles is a painful rash that develops in people who had chickenpox or have been exposed to the virus  How to eat healthy:  My Plate is a model for planning healthy meals  It shows the types and amounts of foods that should go on your plate  Fruits and vegetables make up about half of your plate, and grains and protein make up the other half  A serving of dairy is included on the side of your plate  The amount of calories and serving sizes you need depends on your age, gender, weight, and height  Examples of healthy foods are listed below:  · Eat a variety of vegetables  such as dark green, red, and orange vegetables  You can also include canned vegetables low in sodium (salt) and frozen vegetables without added butter or sauces  · Eat a variety of fresh fruits , canned fruit in 100% juice, frozen fruit, and dried fruit  · Include whole grains  At least half of the grains you eat should be whole grains  Examples include whole-wheat bread, wheat pasta, brown rice, and whole-grain cereals such as oatmeal     · Eat a variety of protein foods such as seafood (fish and shellfish), lean meat, and poultry without skin (turkey and chicken)  Examples of lean meats include pork leg, shoulder, or tenderloin, and beef round, sirloin, tenderloin, and extra lean ground beef  Other protein foods include eggs and egg substitutes, beans, peas, soy products, nuts, and seeds  · Choose low-fat dairy products such as skim or 1% milk or low-fat yogurt, cheese, and cottage cheese  · Limit unhealthy fats  such as butter, hard margarine, and shortening       Exercise:  Exercise at least 30 minutes per day on most days of the week  Some examples of exercise include walking, biking, dancing, and swimming  You can also fit in more physical activity by taking the stairs instead of the elevator or parking farther away from stores  Include muscle strengthening activities 2 days each week  Regular exercise provides many health benefits  It helps you manage your weight, and decreases your risk for type 2 diabetes, heart disease, stroke, and high blood pressure  Exercise can also help improve your mood  Ask your healthcare provider about the best exercise plan for you  General health and safety guidelines:   · Do not smoke  Nicotine and other chemicals in cigarettes and cigars can cause lung damage  Ask your healthcare provider for information if you currently smoke and need help to quit  E-cigarettes or smokeless tobacco still contain nicotine  Talk to your healthcare provider before you use these products  · Limit alcohol  A drink of alcohol is 12 ounces of beer, 5 ounces of wine, or 1½ ounces of liquor  · Lose weight, if needed  Being overweight increases your risk of certain health conditions  These include heart disease, high blood pressure, type 2 diabetes, and certain types of cancer  · Protect your skin  Do not sunbathe or use tanning beds  Use sunscreen with a SPF 15 or higher  Apply sunscreen at least 15 minutes before you go outside  Reapply sunscreen every 2 hours  Wear protective clothing, hats, and sunglasses when you are outside  · Drive safely  Always wear your seatbelt  Make sure everyone in your car wears a seatbelt  A seatbelt can save your life if you are in an accident  Do not use your cell phone when you are driving  This could distract you and cause an accident  Pull over if you need to make a call or send a text message  · Practice safe sex  Use latex condoms if are sexually active and have more than one partner   Your healthcare provider may recommend screening tests for sexually transmitted infections (STIs)  · Wear helmets, lifejackets, and protective gear  Always wear a helmet when you ride a bike or motorcycle, go skiing, or play sports that could cause a head injury  Wear protective equipment when you play sports  Wear a lifejacket when you are on a boat or doing water sports  © Copyright [employer] 2021 Information is for End User's use only and may not be sold, redistributed or otherwise used for commercial purposes  All illustrations and images included in CareNotes® are the copyrighted property of [employer]  or [facility] Emily Sood   The above information is an  only  It is not intended as medical advice for individual conditions or treatments  Talk to your doctor, nurse or pharmacist before following any medical regimen to see if it is safe and effective for you

## 2021-11-10 ENCOUNTER — HOSPITAL ENCOUNTER (EMERGENCY)
Facility: HOSPITAL | Age: 31
Discharge: HOME | End: 2021-11-10
Attending: EMERGENCY MEDICINE | Admitting: EMERGENCY MEDICINE
Payer: COMMERCIAL

## 2021-11-10 ENCOUNTER — APPOINTMENT (EMERGENCY)
Dept: ULTRASOUND IMAGING | Facility: HOSPITAL | Age: 31
End: 2021-11-10
Payer: COMMERCIAL

## 2021-11-10 ENCOUNTER — APPOINTMENT (EMERGENCY)
Dept: CT IMAGING | Facility: HOSPITAL | Age: 31
End: 2021-11-10
Payer: COMMERCIAL

## 2021-11-10 VITALS
TEMPERATURE: 97.3 F | DIASTOLIC BLOOD PRESSURE: 102 MMHG | WEIGHT: 250 LBS | RESPIRATION RATE: 18 BRPM | BODY MASS INDEX: 39.24 KG/M2 | OXYGEN SATURATION: 100 % | HEIGHT: 67 IN | SYSTOLIC BLOOD PRESSURE: 146 MMHG | HEART RATE: 98 BPM

## 2021-11-10 DIAGNOSIS — D72.829 LEUKOCYTOSIS: ICD-10-CM

## 2021-11-10 DIAGNOSIS — K80.20 CALCULUS OF GALLBLADDER WITHOUT CHOLECYSTITIS WITHOUT OBSTRUCTION: ICD-10-CM

## 2021-11-10 DIAGNOSIS — K52.9 GASTROENTERITIS: ICD-10-CM

## 2021-11-10 DIAGNOSIS — R10.11 RUQ ABDOMINAL PAIN: ICD-10-CM

## 2021-11-10 DIAGNOSIS — M54.50 ACUTE BILATERAL LOW BACK PAIN WITHOUT SCIATICA: Primary | ICD-10-CM

## 2021-11-10 DIAGNOSIS — D13.5 ADENOMYOMATOSIS OF GALLBLADDER: ICD-10-CM

## 2021-11-10 LAB
ALBUMIN SERPL BCP-MCNC: 4.2 G/DL (ref 3.5–5)
ALP SERPL-CCNC: 103 U/L (ref 46–116)
ALT SERPL W P-5'-P-CCNC: 41 U/L (ref 12–78)
ANION GAP SERPL CALCULATED.3IONS-SCNC: 10 MMOL/L (ref 4–13)
AST SERPL W P-5'-P-CCNC: 20 U/L (ref 5–45)
BASOPHILS # BLD AUTO: 0.07 THOUSANDS/ΜL (ref 0–0.1)
BASOPHILS NFR BLD AUTO: 0 % (ref 0–1)
BILIRUB SERPL-MCNC: 0.31 MG/DL (ref 0.2–1)
BILIRUB UR QL STRIP: NEGATIVE
BUN SERPL-MCNC: 9 MG/DL (ref 5–25)
CALCIUM SERPL-MCNC: 10.1 MG/DL (ref 8.3–10.1)
CHLORIDE SERPL-SCNC: 99 MMOL/L (ref 100–108)
CLARITY UR: CLEAR
CO2 SERPL-SCNC: 27 MMOL/L (ref 21–32)
COLOR UR: YELLOW
CREAT SERPL-MCNC: 0.7 MG/DL (ref 0.6–1.3)
EOSINOPHIL # BLD AUTO: 0.07 THOUSAND/ΜL (ref 0–0.61)
EOSINOPHIL NFR BLD AUTO: 0 % (ref 0–6)
ERYTHROCYTE [DISTWIDTH] IN BLOOD BY AUTOMATED COUNT: 13.4 % (ref 11.6–15.1)
EXT PREG TEST URINE: NEGATIVE
EXT. CONTROL ED NAV: NORMAL
GFR SERPL CREATININE-BSD FRML MDRD: 117 ML/MIN/1.73SQ M
GLUCOSE SERPL-MCNC: 113 MG/DL (ref 65–140)
GLUCOSE UR STRIP-MCNC: NEGATIVE MG/DL
HCT VFR BLD AUTO: 44.1 % (ref 34.8–46.1)
HGB BLD-MCNC: 14.5 G/DL (ref 11.5–15.4)
HGB UR QL STRIP.AUTO: NEGATIVE
IMM GRANULOCYTES # BLD AUTO: 0.08 THOUSAND/UL (ref 0–0.2)
IMM GRANULOCYTES NFR BLD AUTO: 1 % (ref 0–2)
KETONES UR STRIP-MCNC: NEGATIVE MG/DL
LEUKOCYTE ESTERASE UR QL STRIP: NEGATIVE
LIPASE SERPL-CCNC: 147 U/L (ref 73–393)
LYMPHOCYTES # BLD AUTO: 1.57 THOUSANDS/ΜL (ref 0.6–4.47)
LYMPHOCYTES NFR BLD AUTO: 9 % (ref 14–44)
MCH RBC QN AUTO: 29.4 PG (ref 26.8–34.3)
MCHC RBC AUTO-ENTMCNC: 32.9 G/DL (ref 31.4–37.4)
MCV RBC AUTO: 89 FL (ref 82–98)
MONOCYTES # BLD AUTO: 0.67 THOUSAND/ΜL (ref 0.17–1.22)
MONOCYTES NFR BLD AUTO: 4 % (ref 4–12)
NEUTROPHILS # BLD AUTO: 14.37 THOUSANDS/ΜL (ref 1.85–7.62)
NEUTS SEG NFR BLD AUTO: 86 % (ref 43–75)
NITRITE UR QL STRIP: NEGATIVE
NRBC BLD AUTO-RTO: 0 /100 WBCS
PH UR STRIP.AUTO: 6 [PH]
PLATELET # BLD AUTO: 422 THOUSANDS/UL (ref 149–390)
PMV BLD AUTO: 9 FL (ref 8.9–12.7)
POTASSIUM SERPL-SCNC: 3.7 MMOL/L (ref 3.5–5.3)
PROT SERPL-MCNC: 9.3 G/DL (ref 6.4–8.2)
PROT UR STRIP-MCNC: NEGATIVE MG/DL
RBC # BLD AUTO: 4.94 MILLION/UL (ref 3.81–5.12)
SODIUM SERPL-SCNC: 136 MMOL/L (ref 136–145)
SP GR UR STRIP.AUTO: <=1.005 (ref 1–1.03)
UROBILINOGEN UR QL STRIP.AUTO: 0.2 E.U./DL
WBC # BLD AUTO: 16.83 THOUSAND/UL (ref 4.31–10.16)

## 2021-11-10 PROCEDURE — 99284 EMERGENCY DEPT VISIT MOD MDM: CPT

## 2021-11-10 PROCEDURE — 83690 ASSAY OF LIPASE: CPT | Performed by: EMERGENCY MEDICINE

## 2021-11-10 PROCEDURE — 76705 ECHO EXAM OF ABDOMEN: CPT

## 2021-11-10 PROCEDURE — 80053 COMPREHEN METABOLIC PANEL: CPT | Performed by: EMERGENCY MEDICINE

## 2021-11-10 PROCEDURE — 96375 TX/PRO/DX INJ NEW DRUG ADDON: CPT

## 2021-11-10 PROCEDURE — 96361 HYDRATE IV INFUSION ADD-ON: CPT

## 2021-11-10 PROCEDURE — 96374 THER/PROPH/DIAG INJ IV PUSH: CPT

## 2021-11-10 PROCEDURE — 74177 CT ABD & PELVIS W/CONTRAST: CPT

## 2021-11-10 PROCEDURE — 85025 COMPLETE CBC W/AUTO DIFF WBC: CPT | Performed by: EMERGENCY MEDICINE

## 2021-11-10 PROCEDURE — 99285 EMERGENCY DEPT VISIT HI MDM: CPT | Performed by: EMERGENCY MEDICINE

## 2021-11-10 PROCEDURE — G1004 CDSM NDSC: HCPCS

## 2021-11-10 PROCEDURE — 81003 URINALYSIS AUTO W/O SCOPE: CPT | Performed by: EMERGENCY MEDICINE

## 2021-11-10 PROCEDURE — 81025 URINE PREGNANCY TEST: CPT | Performed by: EMERGENCY MEDICINE

## 2021-11-10 PROCEDURE — 36415 COLL VENOUS BLD VENIPUNCTURE: CPT | Performed by: EMERGENCY MEDICINE

## 2021-11-10 RX ORDER — ONDANSETRON 2 MG/ML
4 INJECTION INTRAMUSCULAR; INTRAVENOUS ONCE
Status: COMPLETED | OUTPATIENT
Start: 2021-11-10 | End: 2021-11-10

## 2021-11-10 RX ORDER — ONDANSETRON 4 MG/1
4 TABLET, ORALLY DISINTEGRATING ORAL EVERY 6 HOURS PRN
Qty: 20 TABLET | Refills: 0 | Status: SHIPPED | OUTPATIENT
Start: 2021-11-10 | End: 2021-11-19

## 2021-11-10 RX ORDER — KETOROLAC TROMETHAMINE 30 MG/ML
15 INJECTION, SOLUTION INTRAMUSCULAR; INTRAVENOUS ONCE
Status: COMPLETED | OUTPATIENT
Start: 2021-11-10 | End: 2021-11-10

## 2021-11-10 RX ORDER — DICYCLOMINE HCL 20 MG
20 TABLET ORAL 2 TIMES DAILY
Qty: 20 TABLET | Refills: 0 | Status: SHIPPED | OUTPATIENT
Start: 2021-11-10 | End: 2021-11-19

## 2021-11-10 RX ADMIN — ONDANSETRON 4 MG: 2 INJECTION INTRAMUSCULAR; INTRAVENOUS at 02:03

## 2021-11-10 RX ADMIN — KETOROLAC TROMETHAMINE 15 MG: 30 INJECTION, SOLUTION INTRAMUSCULAR at 02:03

## 2021-11-10 RX ADMIN — SODIUM CHLORIDE 1000 ML: 0.9 INJECTION, SOLUTION INTRAVENOUS at 02:02

## 2021-11-10 RX ADMIN — IOHEXOL 100 ML: 350 INJECTION, SOLUTION INTRAVENOUS at 02:59

## 2021-11-11 ENCOUNTER — OFFICE VISIT (OUTPATIENT)
Dept: FAMILY MEDICINE CLINIC | Facility: CLINIC | Age: 31
End: 2021-11-11
Payer: COMMERCIAL

## 2021-11-11 VITALS — SYSTOLIC BLOOD PRESSURE: 144 MMHG | HEART RATE: 124 BPM | DIASTOLIC BLOOD PRESSURE: 100 MMHG

## 2021-11-11 DIAGNOSIS — K80.20 GALL STONES: Primary | ICD-10-CM

## 2021-11-11 DIAGNOSIS — R10.13 EPIGASTRIC PAIN: ICD-10-CM

## 2021-11-11 PROCEDURE — 99213 OFFICE O/P EST LOW 20 MIN: CPT | Performed by: NURSE PRACTITIONER

## 2021-11-11 RX ORDER — PANTOPRAZOLE SODIUM 20 MG/1
20 TABLET, DELAYED RELEASE ORAL DAILY
Qty: 30 TABLET | Refills: 1 | Status: SHIPPED | OUTPATIENT
Start: 2021-11-11 | End: 2021-12-27

## 2021-11-16 ENCOUNTER — TELEPHONE (OUTPATIENT)
Dept: FAMILY MEDICINE CLINIC | Facility: CLINIC | Age: 31
End: 2021-11-16

## 2021-11-17 DIAGNOSIS — R10.13 EPIGASTRIC PAIN: Primary | ICD-10-CM

## 2021-11-17 DIAGNOSIS — K80.20 GALL STONES: ICD-10-CM

## 2021-11-17 RX ORDER — HYDROCODONE BITARTRATE AND ACETAMINOPHEN 5; 325 MG/1; MG/1
1 TABLET ORAL EVERY 6 HOURS PRN
Qty: 20 TABLET | Refills: 0 | Status: SHIPPED | OUTPATIENT
Start: 2021-11-17 | End: 2021-11-19

## 2021-11-19 ENCOUNTER — OFFICE VISIT (OUTPATIENT)
Dept: FAMILY MEDICINE CLINIC | Facility: CLINIC | Age: 31
End: 2021-11-19
Payer: COMMERCIAL

## 2021-11-19 VITALS
DIASTOLIC BLOOD PRESSURE: 82 MMHG | TEMPERATURE: 97.8 F | HEART RATE: 112 BPM | BODY MASS INDEX: 38.99 KG/M2 | WEIGHT: 248.4 LBS | HEIGHT: 67 IN | SYSTOLIC BLOOD PRESSURE: 142 MMHG | OXYGEN SATURATION: 99 %

## 2021-11-19 DIAGNOSIS — Z01.30 BLOOD PRESSURE CHECK: ICD-10-CM

## 2021-11-19 DIAGNOSIS — K80.20 GALL STONES: Primary | ICD-10-CM

## 2021-11-19 PROBLEM — U07.1 COVID-19 VIRUS INFECTION: Status: RESOLVED | Noted: 2021-03-19 | Resolved: 2021-11-19

## 2021-11-19 PROBLEM — Z20.822 SUSPECTED COVID-19 VIRUS INFECTION: Status: RESOLVED | Noted: 2021-03-12 | Resolved: 2021-11-19

## 2021-11-19 PROCEDURE — 99213 OFFICE O/P EST LOW 20 MIN: CPT | Performed by: NURSE PRACTITIONER

## 2021-11-26 ENCOUNTER — CONSULT (OUTPATIENT)
Dept: SURGERY | Facility: CLINIC | Age: 31
End: 2021-11-26
Payer: COMMERCIAL

## 2021-11-26 VITALS
SYSTOLIC BLOOD PRESSURE: 140 MMHG | TEMPERATURE: 96.9 F | HEIGHT: 67 IN | HEART RATE: 111 BPM | WEIGHT: 245 LBS | DIASTOLIC BLOOD PRESSURE: 82 MMHG | BODY MASS INDEX: 38.45 KG/M2

## 2021-11-26 DIAGNOSIS — K80.20 CHOLELITHIASIS: ICD-10-CM

## 2021-11-26 DIAGNOSIS — K80.20 GALL STONES: ICD-10-CM

## 2021-11-26 DIAGNOSIS — K80.20 CALCULUS OF GALLBLADDER WITHOUT CHOLECYSTITIS WITHOUT OBSTRUCTION: Primary | ICD-10-CM

## 2021-11-26 DIAGNOSIS — Z01.818 PRE-OP TESTING: Primary | ICD-10-CM

## 2021-11-26 DIAGNOSIS — R10.13 EPIGASTRIC PAIN: ICD-10-CM

## 2021-11-26 PROCEDURE — 99243 OFF/OP CNSLTJ NEW/EST LOW 30: CPT | Performed by: SURGERY

## 2021-11-26 NOTE — H&P (VIEW-ONLY)
Assessment/Plan:   Reyna Luong is a 28 y  o female who is here for Gallbladder disease         Upon evaluation today patient has: Chronic Cholecystitis      Plan: Laparoscopic Cholecystectomy with possible Intraoperative Cholangiogram  Possible Robotic assisted      Post Op Pain Management: Norco      Ultrasound findings: 19 cm liver, gallstones with no wall thickening     discussed hepatomegaly  Encouraged weight loss  Preoperative Clearance: None          Images:         ____________________________________________________    HPI:  Reyna Luong is a 28 y  o female who was referred for evaluation of Diagnoses of Gall stones and Epigastric pain were pertinent to this visit  Abdominal pain Location: in the RUQ with radiation to right back, which is Intermittent  and over 1 month     no nausea and no vomiting,     regular bowel movement       Duration of pain or symptoms: over 1 month and no pain       Prior Colonoscopy : None     Prior Abdominal Surgery: none    Anticoagulation: none    Imaging:   No orders to display           LABS:    Lab Results   Component Value Date    K 3 7 11/10/2021    CL 99 (L) 11/10/2021    CO2 27 11/10/2021    BUN 9 11/10/2021    CREATININE 0 70 11/10/2021    CALCIUM 10 1 11/10/2021    AST 20 11/10/2021    ALT 41 11/10/2021    ALKPHOS 103 11/10/2021    EGFR 117 11/10/2021       Lab Results   Component Value Date    WBC 16 83 (H) 11/10/2021    HGB 14 5 11/10/2021    HCT 44 1 11/10/2021    MCV 89 11/10/2021     (H) 11/10/2021     No results found for: INR, PROTIME  Lab Results   Component Value Date    HGBA1C 5 1 05/27/2021           ROS:  General ROS: negative for - chills, fatigue, fever or night sweats, weight loss  Respiratory ROS: no cough, shortness of breath, or wheezing  Cardiovascular ROS: no chest pain or dyspnea on exertion  Genito-Urinary ROS: no dysuria, trouble voiding, or hematuria  Musculoskeletal ROS: negative for - gait disturbance, joint pain or muscle pain  Neurological ROS: no TIA or stroke symptoms  Gastrointestinal ROS: See HPI   GI ROS: see HPI  Skin ROS: no new rashes or lesions   Lymphatic ROS: no new adenopathy noted by pt  GYN ROS: see HPI, no new GYN history or bleeding noted  Psy ROS: no new mental or behavioral disturbances       Patient Active Problem List   Diagnosis   (none) - all problems resolved or deleted         Allergies:  Patient has no known allergies  Current Outpatient Medications:     pantoprazole (PROTONIX) 20 mg tablet, Take 1 tablet (20 mg total) by mouth daily, Disp: 30 tablet, Rfl: 1    No past medical history on file  Past Surgical History:   Procedure Laterality Date    KNEE SURGERY Right     WISDOM TOOTH EXTRACTION         Family History   Problem Relation Age of Onset    Breast cancer Mother     Hypertension Father         reports that she has never smoked  She has never used smokeless tobacco  She reports that she does not drink alcohol and does not use drugs  Exam:   Vitals:    11/26/21 0842   BP: 140/82   Pulse: (!) 111   Temp: (!) 96 9 °F (36 1 °C)       PHYSICAL EXAM  General Appearance:    Alert, cooperative, no distress,    Head:    Normocephalic without obvious abnormality   Eyes:    PERRL, conjunctiva/corneas clear,       Neck:   Supple, no adenopathy, no JVD   Back:     Symmetric, no spinal or CVA tenderness   Lungs:     Clear to auscultation bilaterally, no wheezing or rhonchi   Heart:    Regular rate and rhythm, S1 and S2 normal, no murmur   Abdomen:     abdomen is soft without significant tenderness, masses, organomegaly or guarding Bowel sounds are normal      Extremities:   Extremities normal  No clubbing, cyanosis or edema   Psych:   Normal Affect, AOx3  Neurologic:  Skin:   CNII-XII intact  Strength symmetric, speech intact    Warm, dry, intact, no visible rashes or lesions      Informed consent for procedure was personally discussed, reviewed, and signed by Dr Aparna Roland  Discussion by Dr Dorys Galvez was carried out regarding risks, benefits, and alternatives with the patient  Risks include but are not limited to:  bleeding, infection, and delayed wound healing or an open wound, pulmonary embolus, leaks from bowel or bile ducts or other viscus, transfusions, death  Discussed in further detail the more common complications and their rates of occurrence   was used if necessary  Patient expressed understanding of the issues discussed and wished/consented to proceed  All questions were answered by Dr Dorys Galvez  Discussion performed between patient and the provider signing below  Signature:   Jennifer Powers MD    Date: 11/26/2021 Time: 9:00 AM                          Some portions of this record may have been generated with voice recognition software  There may be translation, syntax,  or grammatical errors  Occasional wrong word or "sound-a-like" substitutions may have occurred due to the inherent limitations of the voice recognition software  Read the chart carefully and recognize, using context, where substitutions may have occurred  If you have any questions, please contact the dictating provider for clarification or correction, as needed  This encounter has been coded by a non-certified coder

## 2021-12-06 ENCOUNTER — APPOINTMENT (OUTPATIENT)
Dept: LAB | Facility: CLINIC | Age: 31
End: 2021-12-06
Payer: COMMERCIAL

## 2021-12-06 DIAGNOSIS — Z01.818 PRE-OP TESTING: ICD-10-CM

## 2021-12-06 LAB
ALBUMIN SERPL BCP-MCNC: 3.8 G/DL (ref 3.5–5)
ALP SERPL-CCNC: 87 U/L (ref 46–116)
ALT SERPL W P-5'-P-CCNC: 44 U/L (ref 12–78)
ANION GAP SERPL CALCULATED.3IONS-SCNC: 8 MMOL/L (ref 4–13)
AST SERPL W P-5'-P-CCNC: 22 U/L (ref 5–45)
BASOPHILS # BLD AUTO: 0.07 THOUSANDS/ΜL (ref 0–0.1)
BASOPHILS NFR BLD AUTO: 1 % (ref 0–1)
BILIRUB SERPL-MCNC: 0.58 MG/DL (ref 0.2–1)
BUN SERPL-MCNC: 8 MG/DL (ref 5–25)
CALCIUM SERPL-MCNC: 10.5 MG/DL (ref 8.3–10.1)
CHLORIDE SERPL-SCNC: 104 MMOL/L (ref 100–108)
CO2 SERPL-SCNC: 25 MMOL/L (ref 21–32)
CREAT SERPL-MCNC: 0.69 MG/DL (ref 0.6–1.3)
EOSINOPHIL # BLD AUTO: 0.23 THOUSAND/ΜL (ref 0–0.61)
EOSINOPHIL NFR BLD AUTO: 3 % (ref 0–6)
ERYTHROCYTE [DISTWIDTH] IN BLOOD BY AUTOMATED COUNT: 14.1 % (ref 11.6–15.1)
GFR SERPL CREATININE-BSD FRML MDRD: 117 ML/MIN/1.73SQ M
GLUCOSE P FAST SERPL-MCNC: 82 MG/DL (ref 65–99)
HCT VFR BLD AUTO: 41.8 % (ref 34.8–46.1)
HGB BLD-MCNC: 13.3 G/DL (ref 11.5–15.4)
IMM GRANULOCYTES # BLD AUTO: 0.02 THOUSAND/UL (ref 0–0.2)
IMM GRANULOCYTES NFR BLD AUTO: 0 % (ref 0–2)
LYMPHOCYTES # BLD AUTO: 3.48 THOUSANDS/ΜL (ref 0.6–4.47)
LYMPHOCYTES NFR BLD AUTO: 41 % (ref 14–44)
MCH RBC QN AUTO: 29.2 PG (ref 26.8–34.3)
MCHC RBC AUTO-ENTMCNC: 31.8 G/DL (ref 31.4–37.4)
MCV RBC AUTO: 92 FL (ref 82–98)
MONOCYTES # BLD AUTO: 0.54 THOUSAND/ΜL (ref 0.17–1.22)
MONOCYTES NFR BLD AUTO: 6 % (ref 4–12)
NEUTROPHILS # BLD AUTO: 4.15 THOUSANDS/ΜL (ref 1.85–7.62)
NEUTS SEG NFR BLD AUTO: 49 % (ref 43–75)
NRBC BLD AUTO-RTO: 0 /100 WBCS
PLATELET # BLD AUTO: 327 THOUSANDS/UL (ref 149–390)
PMV BLD AUTO: 10.4 FL (ref 8.9–12.7)
POTASSIUM SERPL-SCNC: 3.7 MMOL/L (ref 3.5–5.3)
PROT SERPL-MCNC: 8.5 G/DL (ref 6.4–8.2)
RBC # BLD AUTO: 4.55 MILLION/UL (ref 3.81–5.12)
SODIUM SERPL-SCNC: 137 MMOL/L (ref 136–145)
WBC # BLD AUTO: 8.49 THOUSAND/UL (ref 4.31–10.16)

## 2021-12-06 PROCEDURE — 36415 COLL VENOUS BLD VENIPUNCTURE: CPT

## 2021-12-06 PROCEDURE — 80053 COMPREHEN METABOLIC PANEL: CPT

## 2021-12-06 PROCEDURE — 85025 COMPLETE CBC W/AUTO DIFF WBC: CPT

## 2021-12-08 RX ORDER — ACETAMINOPHEN 500 MG
500-1000 TABLET ORAL EVERY 6 HOURS PRN
COMMUNITY
End: 2022-03-06 | Stop reason: ALTCHOICE

## 2021-12-08 NOTE — PRE-PROCEDURE INSTRUCTIONS
Pre-Surgery Instructions:   Medication Instructions    acetaminophen (TYLENOL) 500 mg tablet Instructed patient per Anesthesia Guidelines  No meds needed am of surgery per anesthesia guidelines

## 2021-12-14 ENCOUNTER — ANESTHESIA EVENT (OUTPATIENT)
Dept: PERIOP | Facility: HOSPITAL | Age: 31
End: 2021-12-14
Payer: COMMERCIAL

## 2021-12-15 ENCOUNTER — ANESTHESIA (OUTPATIENT)
Dept: PERIOP | Facility: HOSPITAL | Age: 31
End: 2021-12-15
Payer: COMMERCIAL

## 2021-12-15 ENCOUNTER — HOSPITAL ENCOUNTER (OUTPATIENT)
Dept: RADIOLOGY | Facility: HOSPITAL | Age: 31
Setting detail: OUTPATIENT SURGERY
Discharge: HOME | End: 2021-12-15
Payer: COMMERCIAL

## 2021-12-15 ENCOUNTER — HOSPITAL ENCOUNTER (OUTPATIENT)
Facility: HOSPITAL | Age: 31
Setting detail: OUTPATIENT SURGERY
Discharge: HOME | End: 2021-12-15
Attending: SURGERY | Admitting: SURGERY
Payer: COMMERCIAL

## 2021-12-15 VITALS
SYSTOLIC BLOOD PRESSURE: 136 MMHG | OXYGEN SATURATION: 98 % | BODY MASS INDEX: 38.75 KG/M2 | HEART RATE: 110 BPM | WEIGHT: 246.91 LBS | RESPIRATION RATE: 18 BRPM | TEMPERATURE: 98.2 F | HEIGHT: 67 IN | DIASTOLIC BLOOD PRESSURE: 85 MMHG

## 2021-12-15 DIAGNOSIS — K80.20 CALCULUS OF GALLBLADDER WITHOUT CHOLECYSTITIS WITHOUT OBSTRUCTION: ICD-10-CM

## 2021-12-15 DIAGNOSIS — K80.20 CALCULUS OF GALLBLADDER WITHOUT CHOLECYSTITIS WITHOUT OBSTRUCTION: Primary | ICD-10-CM

## 2021-12-15 LAB
EXT PREGNANCY TEST URINE: NEGATIVE
EXT. CONTROL: NORMAL

## 2021-12-15 PROCEDURE — 81025 URINE PREGNANCY TEST: CPT | Performed by: ANESTHESIOLOGY

## 2021-12-15 PROCEDURE — 88304 TISSUE EXAM BY PATHOLOGIST: CPT | Performed by: PATHOLOGY

## 2021-12-15 PROCEDURE — 47563 LAPARO CHOLECYSTECTOMY/GRAPH: CPT | Performed by: SURGERY

## 2021-12-15 PROCEDURE — S2900 ROBOTIC SURGICAL SYSTEM: HCPCS | Performed by: SURGERY

## 2021-12-15 PROCEDURE — 47563 LAPARO CHOLECYSTECTOMY/GRAPH: CPT | Performed by: PHYSICIAN ASSISTANT

## 2021-12-15 RX ORDER — ROCURONIUM BROMIDE 10 MG/ML
INJECTION, SOLUTION INTRAVENOUS AS NEEDED
Status: DISCONTINUED | OUTPATIENT
Start: 2021-12-15 | End: 2021-12-15

## 2021-12-15 RX ORDER — HYDROCODONE BITARTRATE AND ACETAMINOPHEN 5; 325 MG/1; MG/1
1 TABLET ORAL EVERY 6 HOURS PRN
Qty: 5 TABLET | Refills: 0 | Status: SHIPPED | OUTPATIENT
Start: 2021-12-15 | End: 2021-12-25

## 2021-12-15 RX ORDER — FENTANYL CITRATE 50 UG/ML
INJECTION, SOLUTION INTRAMUSCULAR; INTRAVENOUS AS NEEDED
Status: DISCONTINUED | OUTPATIENT
Start: 2021-12-15 | End: 2021-12-15

## 2021-12-15 RX ORDER — KETOROLAC TROMETHAMINE 30 MG/ML
INJECTION, SOLUTION INTRAMUSCULAR; INTRAVENOUS AS NEEDED
Status: DISCONTINUED | OUTPATIENT
Start: 2021-12-15 | End: 2021-12-15

## 2021-12-15 RX ORDER — ONDANSETRON 2 MG/ML
INJECTION INTRAMUSCULAR; INTRAVENOUS AS NEEDED
Status: DISCONTINUED | OUTPATIENT
Start: 2021-12-15 | End: 2021-12-15

## 2021-12-15 RX ORDER — INDOCYANINE GREEN AND WATER 25 MG
25 KIT INJECTION ONCE
Status: DISCONTINUED | OUTPATIENT
Start: 2021-12-15 | End: 2021-12-15 | Stop reason: HOSPADM

## 2021-12-15 RX ORDER — DEXAMETHASONE SODIUM PHOSPHATE 10 MG/ML
INJECTION, SOLUTION INTRAMUSCULAR; INTRAVENOUS AS NEEDED
Status: DISCONTINUED | OUTPATIENT
Start: 2021-12-15 | End: 2021-12-15

## 2021-12-15 RX ORDER — ONDANSETRON 4 MG/1
4 TABLET, ORALLY DISINTEGRATING ORAL EVERY 8 HOURS PRN
Status: DISCONTINUED | OUTPATIENT
Start: 2021-12-15 | End: 2021-12-15 | Stop reason: HOSPADM

## 2021-12-15 RX ORDER — LIDOCAINE HYDROCHLORIDE 10 MG/ML
INJECTION, SOLUTION EPIDURAL; INFILTRATION; INTRACAUDAL; PERINEURAL AS NEEDED
Status: DISCONTINUED | OUTPATIENT
Start: 2021-12-15 | End: 2021-12-15

## 2021-12-15 RX ORDER — FENTANYL CITRATE/PF 50 MCG/ML
50 SYRINGE (ML) INJECTION
Status: DISCONTINUED | OUTPATIENT
Start: 2021-12-15 | End: 2021-12-15 | Stop reason: HOSPADM

## 2021-12-15 RX ORDER — DEXTROSE AND SODIUM CHLORIDE 5; .45 G/100ML; G/100ML
80 INJECTION, SOLUTION INTRAVENOUS CONTINUOUS
Status: CANCELLED | OUTPATIENT
Start: 2021-12-15

## 2021-12-15 RX ORDER — HYDROCODONE BITARTRATE AND ACETAMINOPHEN 5; 325 MG/1; MG/1
1 TABLET ORAL EVERY 6 HOURS PRN
Status: DISCONTINUED | OUTPATIENT
Start: 2021-12-15 | End: 2021-12-15 | Stop reason: HOSPADM

## 2021-12-15 RX ORDER — HYDROMORPHONE HCL/PF 1 MG/ML
0.5 SYRINGE (ML) INJECTION
Status: CANCELLED | OUTPATIENT
Start: 2021-12-15

## 2021-12-15 RX ORDER — PROPOFOL 10 MG/ML
INJECTION, EMULSION INTRAVENOUS AS NEEDED
Status: DISCONTINUED | OUTPATIENT
Start: 2021-12-15 | End: 2021-12-15

## 2021-12-15 RX ORDER — CEFAZOLIN SODIUM 2 G/50ML
2000 SOLUTION INTRAVENOUS ONCE
Status: COMPLETED | OUTPATIENT
Start: 2021-12-15 | End: 2021-12-15

## 2021-12-15 RX ORDER — SODIUM CHLORIDE 9 MG/ML
125 INJECTION, SOLUTION INTRAVENOUS CONTINUOUS
Status: DISCONTINUED | OUTPATIENT
Start: 2021-12-15 | End: 2021-12-15 | Stop reason: HOSPADM

## 2021-12-15 RX ORDER — ONDANSETRON 2 MG/ML
4 INJECTION INTRAMUSCULAR; INTRAVENOUS EVERY 8 HOURS PRN
Status: DISCONTINUED | OUTPATIENT
Start: 2021-12-15 | End: 2021-12-15 | Stop reason: HOSPADM

## 2021-12-15 RX ORDER — ONDANSETRON 2 MG/ML
4 INJECTION INTRAMUSCULAR; INTRAVENOUS ONCE AS NEEDED
Status: DISCONTINUED | OUTPATIENT
Start: 2021-12-15 | End: 2021-12-15 | Stop reason: HOSPADM

## 2021-12-15 RX ORDER — ACETAMINOPHEN 325 MG/1
650 TABLET ORAL EVERY 6 HOURS PRN
Status: DISCONTINUED | OUTPATIENT
Start: 2021-12-15 | End: 2021-12-15 | Stop reason: HOSPADM

## 2021-12-15 RX ORDER — MIDAZOLAM HYDROCHLORIDE 2 MG/2ML
INJECTION, SOLUTION INTRAMUSCULAR; INTRAVENOUS AS NEEDED
Status: DISCONTINUED | OUTPATIENT
Start: 2021-12-15 | End: 2021-12-15

## 2021-12-15 RX ORDER — INDOCYANINE GREEN AND WATER 25 MG
KIT INJECTION AS NEEDED
Status: DISCONTINUED | OUTPATIENT
Start: 2021-12-15 | End: 2021-12-15

## 2021-12-15 RX ADMIN — CEFAZOLIN SODIUM 2000 MG: 2 SOLUTION INTRAVENOUS at 12:05

## 2021-12-15 RX ADMIN — ONDANSETRON 4 MG: 2 INJECTION INTRAMUSCULAR; INTRAVENOUS at 12:25

## 2021-12-15 RX ADMIN — FENTANYL CITRATE 50 MCG: 50 INJECTION INTRAMUSCULAR; INTRAVENOUS at 12:14

## 2021-12-15 RX ADMIN — HYDROCODONE BITARTRATE AND ACETAMINOPHEN 1 TABLET: 5; 325 TABLET ORAL at 16:09

## 2021-12-15 RX ADMIN — KETOROLAC TROMETHAMINE 30 MG: 30 INJECTION, SOLUTION INTRAMUSCULAR at 13:47

## 2021-12-15 RX ADMIN — MIDAZOLAM 2 MG: 1 INJECTION INTRAMUSCULAR; INTRAVENOUS at 12:05

## 2021-12-15 RX ADMIN — FENTANYL CITRATE 50 MCG: 50 INJECTION INTRAMUSCULAR; INTRAVENOUS at 14:19

## 2021-12-15 RX ADMIN — INDOCYANINE GREEN AND WATER 25 MG: KIT at 10:46

## 2021-12-15 RX ADMIN — FENTANYL CITRATE 50 MCG: 50 INJECTION INTRAMUSCULAR; INTRAVENOUS at 13:54

## 2021-12-15 RX ADMIN — PROPOFOL 200 MG: 10 INJECTION, EMULSION INTRAVENOUS at 12:14

## 2021-12-15 RX ADMIN — FENTANYL CITRATE 50 MCG: 50 INJECTION INTRAMUSCULAR; INTRAVENOUS at 12:32

## 2021-12-15 RX ADMIN — ROCURONIUM BROMIDE 50 MG: 50 INJECTION, SOLUTION INTRAVENOUS at 12:14

## 2021-12-15 RX ADMIN — FENTANYL CITRATE 50 MCG: 50 INJECTION INTRAMUSCULAR; INTRAVENOUS at 12:49

## 2021-12-15 RX ADMIN — LIDOCAINE HYDROCHLORIDE 100 MG: 10 INJECTION, SOLUTION EPIDURAL; INFILTRATION; INTRACAUDAL; PERINEURAL at 12:14

## 2021-12-15 RX ADMIN — DEXAMETHASONE SODIUM PHOSPHATE 8 MG: 10 INJECTION INTRAMUSCULAR; INTRAVENOUS at 12:25

## 2021-12-15 RX ADMIN — SODIUM CHLORIDE 125 ML/HR: 0.9 INJECTION, SOLUTION INTRAVENOUS at 10:24

## 2021-12-15 NOTE — OP NOTE
CHOLECYSTECTOMY LAPAROSCOPIC W/ ROBOTICS  Postoperative Note  PATIENT NAME: Edi Dia  : 1990  MRN: [id number]  [facility] OR ROOM 08    Surgery Date: 12/15/2021    Pre operative diagnosis:  Calculus of gallbladder without cholecystitis without obstruction [K80 20]    Operative Indications:  Symptomatic gallbladder disease    Consent:  The risks, benefits, and alternatives to the surgery were discussed with the patient and with the family prior to surgery if present, personally by Dr Rollo Sever  If the consent was obtained by the physician assistant or other representative, the consent was reviewed once again personally by the operating physician  Common complications particular for this procedure as well as unusual complications were discussed, including but not limited to:  bleeding, wound infection, prolonged wound healing, open wounds, reoperation, leak from the bowel or viscus, leak from the bile duct or injury to adjacent or other organs or blood vessels in the abdomen  The significance of bile duct reconstruction was discussed  If the surgery was laparoscopic, it was discussed that possible open surgery could also occur during that same surgery and is always an option in laparoscopic surgery and/or reoperation  A  was used if necessary  The patient expressed understanding of the issues discussed and wished and consented to the procedure to proceed  All questions were answered  Dr Rollo Sever personally discussed the informed consent with this patient  Operative Findings:  Excellent critical view  Excellent ICG view    Post operative diagnosis:  Post-Op Diagnosis Codes:     * Calculus of gallbladder without cholecystitis without obstruction [K80 20]    Procedure:   Procedure(s):  CHOLECYSTECTOMY LAPAROSCOPIC W/ ROBOTICS    Interpretation of fluorescein dye by surgeon              Surgeon(s) and Role:     * Armand Mg MD - Primary     * Erick Muñoz MD -resident learner    BAR Morgan, 1st assistant        The First  assistant/PA was medically necessary for surgical safety the case including suturing, retraction, and hemostasis  A qualified resident was not available for first assistance  I provided direct and immediate supervision  I was present for the entire procedure  Drains:  * No LDAs found *    Specimens:  ID Type Source Tests Collected by Time Destination   1 :  Tissue Gallbladder TISSUE EXAM Maria Esther Beckman MD 12/15/2021 1328        Estimated Blood Loss:   Minimal    Anesthesia Type:   Choice     Procedure: The patient was seen again in the Holding Room  The risks, benefits, complications, treatment options, and expected outcomes were discussed with the patient  The possibilities of reaction to medication, pulmonary aspiration, perforation of viscus, bleeding, recurrent infection, finding a normal gallbladder, the need for additional procedures, failure to diagnose a condition, the possible need to convert to an open procedure, and creating a complication requiring transfusion or operation were discussed with the patient  The patient and/or family concurred with the proposed plan, giving informed consent  The site of surgery properly noted/marked  The patient was taken to Operating Room, identified as Toni Barillas and the procedure verified as Laparoscopic Cholecystectomy with Possible Intraoperative Cholangiogram  A Time Out was held and the above information confirmed  Prior to the induction of general anesthesia, antibiotic prophylaxis was administered  General endotracheal anesthesia was then administered and tolerated well  After the induction, the abdomen was prepped in the usual sterile fashion  The patient was positioned in the supine position  The patient was positioned in the supine position, along with some reverse Trendelenburg  Local anesthetic agent was injected into the skin near the umbilicus and an incision made   The midline fascia was incised and the open technique was used to introduce a  port under direct vision  Pneumoperitoneum was then created with CO2 and tolerated well without any adverse changes in the patient's vital signs  Additional trocars were introduced under direct vision  All skin incisions were infiltrated with a local anesthetic agent before making the incision and placing the trocars  The patient was placed in the head up, left side down position  Robotic ports were used  The robot was then docked  The gallbladder was identified, the fundus grasped and retracted cephalad  Adhesions were lysed bluntly and with the electrocautery where indicated, taking care not to injure any adjacent organs or viscus  The infundibulum was grasped and retracted laterally, exposing the peritoneum overlying the triangle of Calot  The peritoneum was removed anteriorly and posteriorly to the gallbladder, with special attention to the backside of the gallbladder dissection  This allowed for freeing up the gallbladder  The critical view of the triangle Calot was carried out, dissecting out the cystic duct and cystic artery as the only two tubular structures leading to the gallbladder  Once these were clearly identified, the back wall of the gallbladder was lifted away from the cystic plate to expose the posterior aspect of this dissection, ensuring that there were no posterior structures leading into the liver  Fluorescein dye had been given to the patient preoperatively  Using the appropriate camera view, the common bile duct was visualized and well away from the cystic duct during the critical view  The cystic duct was then doubly ligated with Surgical Clips on the patient side and singly clipped on the gallbladder side and divided  The cystic artery was re-identified and ligated with clips and divided as well       The gallbladder was dissected from the liver bed in retrograde fashion with the electrocautery or harmonic scalpel where appropriate  The gallbladder was removed, via an Endo pouch, through the 10 mm port  The liver bed was irrigated and inspected  Hemostasis was achieved  Copious irrigation was utilized and was repeatedly aspirated until clear  The pneumoperitoneum was completely reduced after viewing removal of the trocars under direct vision  The wounds were thoroughly irrigated and the larger port site fascia was then closed with a figure of eight suture; the skin was then closed with 4-0 Monocryl sutures and a sterile dressing was applied  Sponge count and needle count and instrument count were correct x2, and RFA wanding for sponges was also negative at the end of the procedure prior to closure  The patient tolerated the procedure well  Some portions of this record may have been generated with voice recognition software  There may be translation, syntax,  or grammatical errors  Occasional wrong word or "sound-a-like" substitutions may have occurred due to the inherent limitations of the voice recognition software  Read the chart carefully and recognize, using context, where substations may have occurred  If you have any questions, please contact the dictating provider for clarification or correction, as needed         Complications: None    Condition: Stable to PACU    SIGNATURE: Chasidy Rowland MD   DATE: December 15, 2021   TIME: 1:53 PM

## 2021-12-15 NOTE — INTERVAL H&P NOTE
H&P reviewed  After examining the patient I find no changes in the patients condition since the H&P had been written      Vitals:    12/15/21 1034   BP:    Pulse: 104   Resp:    Temp:    SpO2:

## 2021-12-15 NOTE — DISCHARGE INSTRUCTIONS
[facility] Instructions  Dr Tristen Calderón MD, FACS    1  General: You will feel pulling sensations around the wound or funny aches and pains around the incisions  This is normal  Even minor surgery is a change in your body and this is your bodys way of reaction to it  If you have had abdominal surgery, it may help to support the incision with a small pillow or blanket for comfort when moving or coughing  2  Wound care: Make sure to remove the bandage in about 24 hours, unless instructed otherwise  You usually don't have to redress the wound after 24-48 hours, unless for comfort  Keep the incision clean and dry  Let air get to it  If this Steri-Strips fall off, just keep the wound clean  3  Water: You may shower over the wound, unless there are drain tubes left in place  Do not bathe or use a pool or hot tub until cleared by the physician  You may shower right over the staples or Steri-Strips and packing dry when you are done  4  Activity: You may go up and down stairs, walk as much as you are comfortable, but walk at least 3 times each day  If you have had abdominal surgery, do not lift anything heavier than 15 pounds for at least 2-4 weeks, unless cleared by the doctor  5  Diet: You may resume a regular diet  If you had a same-day surgery or overnight stay surgery, you may wish to eat lightly for a few days: soups, crackers, and sandwiches  You may resume a regular diet when ready  6  Medications: Resume all of your previous medications, unless told otherwise by the doctor  Avoid aspirin or ibuprofen (Advil, Motrin, etc ) products for 2-3 days after the date of surgery  You may, at that time, began to take them again  Tylenol is always fine, unless you are taking any narcotic pain medication containing Tylenol (such as Percocet, Darvocet, Vicodin, or anything containing acetaminophen)  Do not take Tylenol if you're taking these medications   You do not need to take the narcotic pain medications unless you are having significant pain and discomfort  7  Driving: You will need someone to drive you home on the day of surgery  Do not drive or make any important decisions while on narcotic pain medication or 24 hours and after anesthesia or sedation for surgery  Generally, you may drive when your off all narcotic pain medications  8  Upset Stomach: You may take Maalox, Tums, or similar items for an upset stomach  If your narcotic pain medication causes an upset stomach, do not take it on an empty stomach  Try taking it with at least some crackers or toast      9  Constipation: Patients often experienced constipation after surgery  You may take over-the-counter medication for this, such as Metamucil, Senokot, Dulcolax, milk of magnesia, etc  You may take a suppository unless you have had anorectal surgery such as a procedure on your hemorrhoids  If you experience significant nausea or vomiting after abdominal surgery, call the office before trying any of these medications  10  Call the office: If you are experiencing any of the following, fevers above 101 5°, significant nausea or vomiting, if the wound develops drainage and/or is excessive redness around the wound, or if you have significant diarrhea or other worsening symptoms  11  Pain: You may be given a prescription for pain  This will be given to the hospital, the day of surgery  12  Sexual Activity: You may resume sexual activity when you feel ready and comfortable and your incision is sealed and healed without apparent infection risk  13  Urination: If you haven't urinated in 6 hours, go directly to the ER for evaluation for urinary retention       [address], [address]  [facility], [address]   Phone: [phone]

## 2021-12-16 ENCOUNTER — TELEPHONE (OUTPATIENT)
Dept: SURGERY | Facility: CLINIC | Age: 31
End: 2021-12-16

## 2021-12-27 ENCOUNTER — OFFICE VISIT (OUTPATIENT)
Dept: SURGERY | Facility: CLINIC | Age: 31
End: 2021-12-27

## 2021-12-27 ENCOUNTER — TELEPHONE (OUTPATIENT)
Dept: SURGERY | Facility: CLINIC | Age: 31
End: 2021-12-27

## 2021-12-27 VITALS
HEIGHT: 67 IN | SYSTOLIC BLOOD PRESSURE: 120 MMHG | BODY MASS INDEX: 40.18 KG/M2 | TEMPERATURE: 97 F | WEIGHT: 256 LBS | DIASTOLIC BLOOD PRESSURE: 82 MMHG | HEART RATE: 123 BPM

## 2021-12-27 DIAGNOSIS — Z09 POSTOP CHECK: Primary | ICD-10-CM

## 2021-12-27 PROCEDURE — 99024 POSTOP FOLLOW-UP VISIT: CPT | Performed by: PHYSICIAN ASSISTANT

## 2022-01-06 ENCOUNTER — OFFICE VISIT (OUTPATIENT)
Dept: SURGERY | Facility: CLINIC | Age: 32
End: 2022-01-06

## 2022-01-06 VITALS
HEIGHT: 67 IN | DIASTOLIC BLOOD PRESSURE: 60 MMHG | BODY MASS INDEX: 41.5 KG/M2 | WEIGHT: 264.4 LBS | TEMPERATURE: 97.5 F | SYSTOLIC BLOOD PRESSURE: 124 MMHG | HEART RATE: 125 BPM

## 2022-01-06 DIAGNOSIS — Z51.89 VISIT FOR WOUND CHECK: Primary | ICD-10-CM

## 2022-01-06 PROCEDURE — 99024 POSTOP FOLLOW-UP VISIT: CPT | Performed by: PHYSICIAN ASSISTANT

## 2022-01-06 NOTE — PROGRESS NOTES
Assessment/Plan:   Josue Hurst is a 28 y  o female who comes in today for a wound check after robotic cholecystectomy on 12/15/2021  Postoperative restrictions reviewed  All questions answered  Steri and glue was removed and a very small amount of yellow serous fluid and blood was expressed  No signs of infection  The hole is the size of the wooden end of a q-tip and extends down about 2 mm  Not big enough to pack  A 2x2 was applied and tape  She will change Band-Aid everyday and return in 1 week to ensure proper healing   ___________________________________________  HPI:  Josue Hurst is a 28 y  o female who comes in today for postoperative check after recent robotic cholecystectomy on 12/15/2021  Last visit she did have a small opening at the superior aspect of the supra umbilical incision  We did put a steri strip and glue on last visit on 12/27  She states the umbilicus bled on 6/2/7606 slightly  ROS:  General ROS: negative for - chills, fatigue, fever or night sweats, weight loss  Respiratory ROS: no cough, shortness of breath, or wheezing  Cardiovascular ROS: no chest pain or dyspnea on exertion  Genito-Urinary ROS: no dysuria, trouble voiding, or hematuria  Musculoskeletal ROS: negative for - gait disturbance, joint pain or muscle pain  Neurological ROS: no TIA or stroke symptoms  GI ROS: see HPI  Skin ROS: no new rashes or lesions   Lymphatic ROS: no new adenopathy noted by pt  GYN ROS: see HPI, no new GYN history or bleeding noted  Psy ROS: no new mental or behavioral disturbances         Patient Active Problem List   Diagnosis    History of COVID-19    Obesity (BMI 35 0-39 9 without comorbidity)    Calculus of gallbladder without cholecystitis without obstruction       Allergies:  Patient has no known allergies        Current Outpatient Medications:     acetaminophen (TYLENOL) 500 mg tablet, Take 500-1,000 mg by mouth every 6 (six) hours as needed for mild pain, Disp: , Rfl:     Past Medical History:   Diagnosis Date    Gallstones     History of COVID-19 March 2021-no hospitalization    Wears glasses        Past Surgical History:   Procedure Laterality Date    KNEE SURGERY Right     TX LAP,CHOLECYSTECTOMY N/A 12/15/2021    Procedure: CHOLECYSTECTOMY LAPAROSCOPIC W/ ROBOTICS;  Surgeon: Tristen Calderón MD;  Location: [facility];  Service: General    WISDOM TOOTH EXTRACTION         Family History   Problem Relation Age of Onset    Breast cancer Mother     Hypertension Father         reports that she has never smoked  She has never used smokeless tobacco  She reports current alcohol use  She reports that she does not use drugs  Vitals:    01/06/22 1318   BP: 124/60   Pulse: (!) 125   Temp: 97 5 °F (36 4 °C)       PHYSICAL EXAM  General: normal, cooperative, no distress  Abdominal: soft, nondistended or nontender  Incision: clean, dry, and intact and healing well The hole is the size of the wooden end of a q-tip and extends down about 2 mm  Not big enough to pack  A 2x2 was applied and tape  No erythema       Viviane Rock PA-C  Date: 1/6/2022 Time: 1:58 PM

## 2022-02-03 ENCOUNTER — TELEPHONE (OUTPATIENT)
Dept: OBGYN CLINIC | Facility: MEDICAL CENTER | Age: 32
End: 2022-02-03

## 2022-02-03 DIAGNOSIS — Z32.01 POSITIVE URINE PREGNANCY TEST: Primary | ICD-10-CM

## 2022-02-21 ENCOUNTER — APPOINTMENT (OUTPATIENT)
Dept: LAB | Facility: CLINIC | Age: 32
End: 2022-02-21
Payer: COMMERCIAL

## 2022-02-21 DIAGNOSIS — Z32.01 POSITIVE URINE PREGNANCY TEST: ICD-10-CM

## 2022-02-21 LAB
ABO GROUP BLD: NORMAL
B-HCG SERPL-ACNC: ABNORMAL MIU/ML
BLD GP AB SCN SERPL QL: NEGATIVE
PROGEST SERPL-MCNC: 15 NG/ML
RH BLD: POSITIVE
SPECIMEN EXPIRATION DATE: NORMAL

## 2022-02-21 PROCEDURE — 84702 CHORIONIC GONADOTROPIN TEST: CPT

## 2022-02-21 PROCEDURE — 84144 ASSAY OF PROGESTERONE: CPT

## 2022-02-21 PROCEDURE — 86850 RBC ANTIBODY SCREEN: CPT

## 2022-02-21 PROCEDURE — 86900 BLOOD TYPING SEROLOGIC ABO: CPT

## 2022-02-21 PROCEDURE — 86901 BLOOD TYPING SEROLOGIC RH(D): CPT

## 2022-02-21 PROCEDURE — 36415 COLL VENOUS BLD VENIPUNCTURE: CPT

## 2022-03-03 ENCOUNTER — ULTRASOUND (OUTPATIENT)
Dept: OBGYN CLINIC | Facility: CLINIC | Age: 32
End: 2022-03-03
Payer: COMMERCIAL

## 2022-03-03 VITALS
WEIGHT: 271.8 LBS | BODY MASS INDEX: 42.66 KG/M2 | HEIGHT: 67 IN | DIASTOLIC BLOOD PRESSURE: 78 MMHG | SYSTOLIC BLOOD PRESSURE: 122 MMHG

## 2022-03-03 DIAGNOSIS — N91.2 AMENORRHEA: Primary | ICD-10-CM

## 2022-03-03 DIAGNOSIS — Z3A.08 8 WEEKS GESTATION OF PREGNANCY: ICD-10-CM

## 2022-03-03 PROCEDURE — 99214 OFFICE O/P EST MOD 30 MIN: CPT | Performed by: OBSTETRICS & GYNECOLOGY

## 2022-03-03 PROCEDURE — 76830 TRANSVAGINAL US NON-OB: CPT | Performed by: OBSTETRICS & GYNECOLOGY

## 2022-03-03 NOTE — PROGRESS NOTES
Assessment  28 y o  Erich Schaffer presenting for amenorrhea  Pregnancy confirmed, dating consistent with LMP  [place] 10/13/2022    Plan  Diagnoses and all orders for this visit:    Amenorrhea  -     AMB US OB < 14 weeks single or first gestation level 1    8 weeks gestation of pregnancy  - Prenatal vitamin  - Prenatal Nursing Intake in 2 weeks  - Prenatal H&P in 4 weeks     ____________________________________________________________      Subjective   Zee Banegas is a 28 y o  Erich Schaffer with an LMP of Patient's last menstrual period was 01/06/2021  (8w0d by LMP) who presents for amenorrhea  She notes she took a home pregnancy and it was positive  She is currently otherwise without complaint  Patient notes that this pregnancy was planned  She recently stopped contraception  She reports she is certain of her LMP and that she has regular menses  This is her first pregnancy  The following portions of the patient's history were reviewed and updated as appropriate: allergies, current medications, past medical history, past social history, past surgical history and problem list     Review of Systems  Review of Systems   Constitutional: Positive for fatigue  Negative for chills and fever  Respiratory: Negative for cough and shortness of breath  Gastrointestinal: Negative for abdominal distention and abdominal pain  Genitourinary: Positive for menstrual problem (amenorrhea)  Negative for pelvic pain, vaginal bleeding, vaginal discharge and vaginal pain  Objective  /78   Ht 5' 7" (1 702 m)   Wt 123 kg (271 lb 12 8 oz)   LMP 01/06/2021   BMI 42 57 kg/m²       Physical Exam:  Physical Exam  Exam conducted with a chaperone present  Constitutional:       General: She is not in acute distress  Appearance: Normal appearance  She is not ill-appearing, toxic-appearing or diaphoretic  Eyes:      General: No scleral icterus  Right eye: No discharge  Left eye: No discharge  Conjunctiva/sclera: Conjunctivae normal    Cardiovascular:      Rate and Rhythm: Normal rate  Pulmonary:      Effort: Pulmonary effort is normal  No respiratory distress  Abdominal:      General: There is no distension  Palpations: There is no mass  Tenderness: There is no abdominal tenderness  There is no guarding or rebound  Hernia: No hernia is present  Genitourinary:     General: Normal vulva  Exam position: Lithotomy position  Labia:         Right: No rash, tenderness or lesion  Left: No rash, tenderness or lesion  Urethra: No prolapse, urethral swelling or urethral lesion  Vagina: No bleeding  Musculoskeletal:         General: No swelling  Skin:     General: Skin is warm and dry  Coloration: Skin is not jaundiced or pale  Findings: No bruising or erythema  Neurological:      Mental Status: She is alert  Psychiatric:         Mood and Affect: Mood normal          Behavior: Behavior normal          Thought Content:  Thought content normal          Judgment: Judgment normal        Reviewed  Hcg/prog

## 2022-03-18 ENCOUNTER — INITIAL PRENATAL (OUTPATIENT)
Dept: OBGYN CLINIC | Facility: MEDICAL CENTER | Age: 32
End: 2022-03-18

## 2022-03-18 VITALS
HEIGHT: 67 IN | SYSTOLIC BLOOD PRESSURE: 138 MMHG | DIASTOLIC BLOOD PRESSURE: 72 MMHG | WEIGHT: 272 LBS | BODY MASS INDEX: 42.69 KG/M2

## 2022-03-18 DIAGNOSIS — Z34.01 ENCOUNTER FOR SUPERVISION OF NORMAL FIRST PREGNANCY IN FIRST TRIMESTER: Primary | ICD-10-CM

## 2022-03-18 PROCEDURE — OBC: Performed by: OBSTETRICS & GYNECOLOGY

## 2022-03-18 NOTE — PROGRESS NOTES
OB History    Para Term  AB Living   1 0 0 0 0 0   SAB IAB Ectopic Multiple Live Births   0 0 0 0 0      # Outcome Date GA Lbr Lukas/2nd Weight Sex Delivery Anes PTL Lv   1 Current                  * Pt presents for OB intake  *  *Pt's LMP was Patient's last menstrual period was 2022  *Ultrasound date:3/3/2022   7weeks 6days  *Estimated date of delivery: 10/12/2022   * confirmed by lmp    *Signs/Symptoms of Pregnancy   *no Constipation    *no Headaches   *no Cramping  *no Spotting    *no PICA cravings    Diabetes     *no Hx of GDM    *yes BMI >35     Hypertension-    *no Hx of chronic HTN     *Infection Screening   *no Does the pt have a hx of MRSA? *no History of herpes? *yes History of COVID? *2021    *Immunizations:   *yes Discussed influenza vaccine     Received:    *yes Discussed TDaP vaccine   *yes COVID Vaccine  Received      ACTIVE MEDICAL/MENTAL HEALTH CONDITIONS  Autoimmune Disease *n  Asthma: *n   Cardiac Disease *n  Chronic HTN, Pregestational  *n  Hepatitis  *n     treated: *n  Thalassemia Alpha*n  Beta *n  Renal Disease *n  Sickle Cell Ds    Trait *n Disease *n  Depression *n   Anxiety*n  Medications*n      *Interview education   *Handouts given:    *Baby and Me support center     *[facility]    *Lab Locations    *[facility]ians List/Choosing Pediatrician Sheet    *[facility] Childbirth and Parenting Classes    *Schedule for Prenatal Visits    *Pregnancy Warning Signs Reviewed    *Safe Medications During Pregnancy    *SMA and CF Testing information sheet    *CPT Code Sheet/MFM 13week NT pamphlet    *[employer]        SBIRT Screen:  Depression Screening Follow-up Plan: Patient's depression screening was negative with an [place] score of  0        *[facility]   *yes discussed genetic testing- pt interested    Zee is aware to call [facility] to schedule us and genetic testing    Patient has been informed of basic prenatal advice such as avoiding alcohol, drugs, and smoking  She should remain hydrated and take daily prenatal vitamins  Patient should avoid caffeine, raw sprouts, high mercury fish, undercooked fish, raw eggs, organ meat, unwashed produce, and unpasteurized cheeses, milk, and fruit juice and undercooked meats  She has been informed about toxoplasmosis and to avoid cat feces  *Details that I feel the provider should be aware of:  Chepe Perkins was seen for her ob intake at [id number]  No complaints at this visit  Chepe Perkins states that she has had some elevated bp previously but was not diagnosed with hypertension  BP this visit was 138/72  Prenatal panel ordered along with 1 hr gtt and labs for bp  MFM referral placed  PN1 visit scheduled for *4/4/2022  The patient was oriented to our practice and all questions were answered      Interviewed by:  Ham Cruz

## 2022-03-18 NOTE — PATIENT INSTRUCTIONS
Pregnancy at 11 to 14 Weeks   AMBULATORY CARE:   Changes happening to your body: You are now at the end of your first trimester and entering your second trimester  Morning sickness usually goes away by this time  You may have other symptoms such as fatigue, frequent urination, and headaches  You may have gained 2 to 4 pounds by now  Seek care immediately if:   · You have pain or cramping in your abdomen or low back  · You have heavy vaginal bleeding or clotting  · You pass material that looks like tissue or large clots  Collect the material and bring it with you  Call your doctor or obstetrician if:   · You cannot keep food or drinks down, and you are losing weight  · You have light vaginal bleeding  · You have chills or a fever  · You have vaginal itching, burning, or pain  · You have yellow, green, white, or foul-smelling vaginal discharge  · You have pain or burning when you urinate, less urine than usual, or pink or bloody urine  · You have questions or concerns about your condition or care  How to care for yourself at this stage of your pregnancy:       · Get plenty of rest   You may feel more tired than normal  You may need to take naps or go to bed earlier  · Manage nausea and vomiting  Avoid fatty and spicy foods  Eat small meals throughout the day instead of large meals  Jessica may help to decrease nausea  Ask your healthcare provider about other ways of decreasing nausea and vomiting  · Eat a variety of healthy foods  Healthy foods include fruits, vegetables, whole-grain breads, low-fat dairy foods, beans, lean meats, and fish  Drink liquids as directed  Ask how much liquid to drink each day and which liquids are best for you  Limit caffeine to less than 200 milligrams each day  Limit your intake of fish to 2 servings each week  Choose fish low in mercury such as canned light tuna, shrimp, salmon, cod, or tilapia   Do not  eat fish high in mercury such as swordfish, tilefish, mony mackerel, and shark  · Take prenatal vitamins as directed  Your need for certain vitamins and minerals, such as folic acid, increases during pregnancy  Prenatal vitamins provide some of the extra vitamins and minerals you need  Prenatal vitamins may also help to decrease the risk of certain birth defects  · Do not smoke  Smoking increases your risk of a miscarriage and other health problems during your pregnancy  Smoking can cause your baby to be born too early or weigh less at birth  Ask your healthcare provider for information if you need help quitting  · Do not drink alcohol  Alcohol passes from your body to your baby through the placenta  It can affect your baby's brain development and cause fetal alcohol syndrome (FAS)  FAS is a group of conditions that causes mental, behavior, and growth problems  · Talk to your healthcare provider before you take any medicines  Many medicines may harm your baby if you take them when you are pregnant  Do not take any medicines, vitamins, herbs, or supplements without first talking to your healthcare provider  Never use illegal or street drugs (such as marijuana or cocaine) while you are pregnant  Safety tips during pregnancy:   · Avoid hot tubs and saunas  Do not use a hot tub or sauna while you are pregnant, especially during your first trimester  Hot tubs and saunas may raise your baby's temperature and increase the risk of birth defects  · Avoid toxoplasmosis  This is an infection caused by eating raw meat or being around infected cat feces  It can cause birth defects, miscarriages, and other problems  Wash your hands after you touch raw meat  Make sure any meat is well-cooked before you eat it  Avoid raw eggs and unpasteurized milk  Use gloves or ask someone else to clean your cat's litter box while you are pregnant  Changes happening with your baby: Your baby has fully formed fingernails and toenails   Your baby's heartbeat can now be heard  Ask your healthcare provider if you can listen to your baby's heartbeat  By week 14, your baby is over 4 inches long from the top of the head to the rump (baby's bottom)  Your baby weighs over 3 ounces  Prenatal care:  Prenatal care is a series of visits with your healthcare provider throughout your pregnancy  During the first 28 weeks of your pregnancy, you will see your healthcare provider 1 time each month  Prenatal care can help prevent problems during pregnancy and childbirth  Your healthcare provider will check your blood pressure and weight  Your baby's heart rate will also be checked  You may also need the following at some visits:  · A pelvic exam  allows your healthcare provider to see your cervix (the bottom part of your uterus)  Your healthcare provider will use a speculum to open your vagina  He or she will check the size and shape of your uterus  · Blood tests  may be done to check for any of the following:    ? Gestational diabetes or anemia (low iron level)    ? Blood type or Rh factor, or certain birth defects    ? Immunity to certain diseases, such as chickenpox or rubella    ? An infection, such as a sexually transmitted infection, HIV, or hepatitis B    · Hepatitis B  may need to be prevented or treated  Hepatitis B is inflammation of the liver caused by the hepatitis B virus (HBV)  HBV can spread from a mother to her baby during delivery  You will be checked for HBV as early as possible in the first trimester of each pregnancy  You need the test even if you received the hepatitis B vaccine or were tested before  You may need to have an HBV infection treated before you give birth  · Urine tests  may also be done to check for sugar and protein  These can be signs of gestational diabetes or preeclampsia  Urine tests may also be done to check for signs of infection  · A fetal ultrasound  shows pictures of your baby inside your uterus   The pictures are used to check your baby's development, movement, and position  · Genetic disorder screening tests  may be offered to you  These tests check your baby's risk for genetic disorders such as Down syndrome  A screening test includes a blood test and ultrasound  Follow up with your doctor or obstetrician as directed:  Go to all prenatal visits  Write down your questions so you remember to ask them during your visits  © Copyright [employer] 2022 Information is for End User's use only and may not be sold, redistributed or otherwise used for commercial purposes  All illustrations and images included in CareNotes® are the copyrighted property of A D A M , Inc  or [address] [place], [place] [address]   The above information is an  only  It is not intended as medical advice for individual conditions or treatments  Talk to your doctor, nurse or pharmacist before following any medical regimen to see if it is safe and effective for you

## 2022-03-22 ENCOUNTER — LAB (OUTPATIENT)
Dept: LAB | Facility: CLINIC | Age: 32
End: 2022-03-22
Payer: COMMERCIAL

## 2022-03-22 DIAGNOSIS — Z34.01 ENCOUNTER FOR SUPERVISION OF NORMAL FIRST PREGNANCY IN FIRST TRIMESTER: ICD-10-CM

## 2022-03-22 LAB
ABO GROUP BLD: NORMAL
ALBUMIN SERPL BCP-MCNC: 3.6 G/DL (ref 3.5–5)
ALP SERPL-CCNC: 74 U/L (ref 46–116)
ALT SERPL W P-5'-P-CCNC: 31 U/L (ref 12–78)
ANION GAP SERPL CALCULATED.3IONS-SCNC: 6 MMOL/L (ref 4–13)
AST SERPL W P-5'-P-CCNC: 19 U/L (ref 5–45)
BASOPHILS # BLD AUTO: 0.06 THOUSANDS/ΜL (ref 0–0.1)
BASOPHILS NFR BLD AUTO: 1 % (ref 0–1)
BILIRUB SERPL-MCNC: 0.32 MG/DL (ref 0.2–1)
BILIRUB UR QL STRIP: NEGATIVE
BLD GP AB SCN SERPL QL: NEGATIVE
BUN SERPL-MCNC: 7 MG/DL (ref 5–25)
CALCIUM SERPL-MCNC: 9.7 MG/DL (ref 8.3–10.1)
CHLORIDE SERPL-SCNC: 105 MMOL/L (ref 100–108)
CLARITY UR: NORMAL
CO2 SERPL-SCNC: 25 MMOL/L (ref 21–32)
COLOR UR: NORMAL
CREAT SERPL-MCNC: 0.64 MG/DL (ref 0.6–1.3)
CREAT UR-MCNC: 26.6 MG/DL
EOSINOPHIL # BLD AUTO: 0.28 THOUSAND/ΜL (ref 0–0.61)
EOSINOPHIL NFR BLD AUTO: 2 % (ref 0–6)
ERYTHROCYTE [DISTWIDTH] IN BLOOD BY AUTOMATED COUNT: 13.4 % (ref 11.6–15.1)
GFR SERPL CREATININE-BSD FRML MDRD: 120 ML/MIN/1.73SQ M
GLUCOSE 1H P 50 G GLC PO SERPL-MCNC: 139 MG/DL (ref 40–134)
GLUCOSE SERPL-MCNC: 133 MG/DL (ref 65–140)
GLUCOSE UR STRIP-MCNC: NEGATIVE MG/DL
HBV SURFACE AG SER QL: NORMAL
HCT VFR BLD AUTO: 39.4 % (ref 34.8–46.1)
HGB BLD-MCNC: 13.2 G/DL (ref 11.5–15.4)
HGB UR QL STRIP.AUTO: NEGATIVE
IMM GRANULOCYTES # BLD AUTO: 0.04 THOUSAND/UL (ref 0–0.2)
IMM GRANULOCYTES NFR BLD AUTO: 0 % (ref 0–2)
KETONES UR STRIP-MCNC: NEGATIVE MG/DL
LEUKOCYTE ESTERASE UR QL STRIP: NEGATIVE
LYMPHOCYTES # BLD AUTO: 2.05 THOUSANDS/ΜL (ref 0.6–4.47)
LYMPHOCYTES NFR BLD AUTO: 17 % (ref 14–44)
MCH RBC QN AUTO: 29.7 PG (ref 26.8–34.3)
MCHC RBC AUTO-ENTMCNC: 33.5 G/DL (ref 31.4–37.4)
MCV RBC AUTO: 89 FL (ref 82–98)
MONOCYTES # BLD AUTO: 0.5 THOUSAND/ΜL (ref 0.17–1.22)
MONOCYTES NFR BLD AUTO: 4 % (ref 4–12)
NEUTROPHILS # BLD AUTO: 9.01 THOUSANDS/ΜL (ref 1.85–7.62)
NEUTS SEG NFR BLD AUTO: 76 % (ref 43–75)
NITRITE UR QL STRIP: NEGATIVE
NRBC BLD AUTO-RTO: 0 /100 WBCS
PH UR STRIP.AUTO: 6.5 [PH]
PLATELET # BLD AUTO: 326 THOUSANDS/UL (ref 149–390)
PMV BLD AUTO: 9.8 FL (ref 8.9–12.7)
POTASSIUM SERPL-SCNC: 3.5 MMOL/L (ref 3.5–5.3)
PROT SERPL-MCNC: 8.2 G/DL (ref 6.4–8.2)
PROT UR STRIP-MCNC: NEGATIVE MG/DL
PROT UR-MCNC: <6 MG/DL
PROT/CREAT UR: <0.23 MG/G{CREAT} (ref 0–0.1)
RBC # BLD AUTO: 4.44 MILLION/UL (ref 3.81–5.12)
RH BLD: POSITIVE
RUBV IGG SERPL IA-ACNC: 88.3 IU/ML
SODIUM SERPL-SCNC: 136 MMOL/L (ref 136–145)
SP GR UR STRIP.AUTO: 1.01 (ref 1–1.03)
SPECIMEN EXPIRATION DATE: NORMAL
URATE SERPL-MCNC: 4.9 MG/DL (ref 2–6.8)
UROBILINOGEN UR STRIP-ACNC: <2 MG/DL
WBC # BLD AUTO: 11.94 THOUSAND/UL (ref 4.31–10.16)

## 2022-03-22 PROCEDURE — 82570 ASSAY OF URINE CREATININE: CPT

## 2022-03-22 PROCEDURE — 87086 URINE CULTURE/COLONY COUNT: CPT

## 2022-03-22 PROCEDURE — 80081 OBSTETRIC PANEL INC HIV TSTG: CPT

## 2022-03-22 PROCEDURE — 36415 COLL VENOUS BLD VENIPUNCTURE: CPT

## 2022-03-22 PROCEDURE — 82950 GLUCOSE TEST: CPT

## 2022-03-22 PROCEDURE — 81003 URINALYSIS AUTO W/O SCOPE: CPT

## 2022-03-22 PROCEDURE — 80053 COMPREHEN METABOLIC PANEL: CPT

## 2022-03-22 PROCEDURE — 84550 ASSAY OF BLOOD/URIC ACID: CPT

## 2022-03-22 PROCEDURE — 84156 ASSAY OF PROTEIN URINE: CPT

## 2022-03-23 LAB
HIV 1+2 AB+HIV1 P24 AG SERPL QL IA: NORMAL
RPR SER QL: NORMAL

## 2022-03-24 ENCOUNTER — TELEPHONE (OUTPATIENT)
Dept: OBGYN CLINIC | Facility: MEDICAL CENTER | Age: 32
End: 2022-03-24

## 2022-03-24 DIAGNOSIS — R73.09 GTT (GLUCOSE TOLERANCE TEST) ABNORMAL: Primary | ICD-10-CM

## 2022-03-24 LAB — BACTERIA UR CULT: NORMAL

## 2022-03-24 NOTE — TELEPHONE ENCOUNTER
----- Message from Shamir Carranza MD sent at 3/24/2022  8:14 AM EDT -----  Please inform patient  elevated 1 hr gtt , needs 3 hr gtt thanks

## 2022-04-01 ENCOUNTER — APPOINTMENT (OUTPATIENT)
Dept: LAB | Facility: HOSPITAL | Age: 32
End: 2022-04-01
Attending: OBSTETRICS & GYNECOLOGY
Payer: COMMERCIAL

## 2022-04-01 ENCOUNTER — APPOINTMENT (OUTPATIENT)
Dept: LAB | Facility: HOSPITAL | Age: 32
End: 2022-04-01
Payer: COMMERCIAL

## 2022-04-01 DIAGNOSIS — R73.09 GTT (GLUCOSE TOLERANCE TEST) ABNORMAL: ICD-10-CM

## 2022-04-01 DIAGNOSIS — Z00.8 HEALTH EXAMINATION IN POPULATION SURVEY: ICD-10-CM

## 2022-04-01 LAB
CHOLEST SERPL-MCNC: 226 MG/DL
EST. AVERAGE GLUCOSE BLD GHB EST-MCNC: 94 MG/DL
GLUCOSE 1H P 100 G GLC PO SERPL-MCNC: 172 MG/DL (ref 65–179)
GLUCOSE 2H P 100 G GLC PO SERPL-MCNC: 132 MG/DL (ref 65–154)
GLUCOSE 3H P 100 G GLC PO SERPL-MCNC: 111 MG/DL (ref 65–139)
GLUCOSE P FAST SERPL-MCNC: 85 MG/DL (ref 65–99)
HBA1C MFR BLD: 4.9 %
HDLC SERPL-MCNC: 85 MG/DL
LDLC SERPL CALC-MCNC: 117 MG/DL (ref 0–100)
NONHDLC SERPL-MCNC: 141 MG/DL
TRIGL SERPL-MCNC: 119 MG/DL

## 2022-04-01 PROCEDURE — 80061 LIPID PANEL: CPT

## 2022-04-01 PROCEDURE — 82952 GTT-ADDED SAMPLES: CPT

## 2022-04-01 PROCEDURE — 82951 GLUCOSE TOLERANCE TEST (GTT): CPT

## 2022-04-01 PROCEDURE — 36415 COLL VENOUS BLD VENIPUNCTURE: CPT

## 2022-04-01 PROCEDURE — 83036 HEMOGLOBIN GLYCOSYLATED A1C: CPT

## 2022-04-03 PROBLEM — Z92.29 COVID-19 VACCINE SERIES COMPLETED: Status: ACTIVE | Noted: 2022-04-03

## 2022-04-03 NOTE — ASSESSMENT & PLAN NOTE
Stating BMI greater then 40  Recommend no more than 11-15 pound gain   Early GTT failed one hour at 139 - passed the 3 hour testing   Will need to repeat at 28 weeks     Growth scan in 3rd trimester    ASA to decrease PEC   Baselines are negative

## 2022-04-03 NOTE — PROGRESS NOTES
Initial Prenatal Visit  [facility] [address] [place] [address]    Assessment/Plan:  Suzie Conde is a 28y o  year old  at [address] [place] [address] of normal pregnancy  - Prenatal labs reviewed and normal   Blood type: B positive  - Aneuploidy screening discussed  Patient  [address]  - insurance does not cover with [facility] so order placed for [facility] they will notify her about the testing   - Routine cervical cancer screening: Pap Up to date  - Routine STI Screening: GC/Chlamydia sent today  HIV/Hep B/Syphilis ordered in prenatal panel   - Patient Education: Patient was counseled regarding diet, exercise, weight gain, foods to avoid, vaccines in pregnancy, aneuploidy screening, travel precautions to include seat belt use and VTE risk reduction  She has been provided our pregnancy packet which includes how and when to contact providers, medication recommendations, dietary suggestions, breastfeeding information as well as websites for additional information, hospital and delivery concerns  Additional Pregnancy Problems:   1  Obesity affecting pregnancy in first trimester  Assessment & Plan:  Stating BMI greater then 40  Recommend no more than 11-15 pound gain   Early GTT failed one hour at 139 - passed the 3 hour testing   Will need to repeat at 28 weeks     Growth scan in 3rd trimester    ASA to decrease PEC   Baselines are negative       2  COVID-19 vaccine series completed        Subjective:   CC:  Desires prenatal care  Everardo Velasquez is a 28 y o   female who presents for prenatal care  Pregnancy ROS: no leakage of fluid, pelvic pain, or vaginal bleeding  Some - getting better  nausea/vomiting      The following portions of the patient's history were reviewed and updated as appropriate: allergies, current medications, past family history, past medical history, obstetric history, gynecologic history, past social history, past surgical history and problem list       Objective:  LMP 01/05/2022   Pregravid Weight/BMI: Pregravid weight not on file (BMI Could not be calculated)  Current Weight:     Total Weight Gain: Not found  General: Well appearing, no distress  Respiratory: Normal respiratory rate, lungs clear to auscultation, no wheezing or rales  Cardiovascular: Regular rate and rhythm, no murmurs, rubs, or gallops  Breasts: Normal bilaterally, nontender without masses, asymmetry, or nipple discharge  Abdomen: Soft, gravid, nontender  : Urethra normal  Normal labia majora and minora  Vagina normal   No vaginal bleeding  No vaginal discharge  Cervix visually closed  Extremities: Warm and well perfused  Non tender  No edema

## 2022-04-04 ENCOUNTER — INITIAL PRENATAL (OUTPATIENT)
Dept: OBGYN CLINIC | Facility: MEDICAL CENTER | Age: 32
End: 2022-04-04
Payer: COMMERCIAL

## 2022-04-04 VITALS — BODY MASS INDEX: 42.76 KG/M2 | SYSTOLIC BLOOD PRESSURE: 130 MMHG | WEIGHT: 273 LBS | DIASTOLIC BLOOD PRESSURE: 70 MMHG

## 2022-04-04 DIAGNOSIS — Z92.29 COVID-19 VACCINE SERIES COMPLETED: ICD-10-CM

## 2022-04-04 DIAGNOSIS — O99.211 OBESITY AFFECTING PREGNANCY IN FIRST TRIMESTER: Primary | ICD-10-CM

## 2022-04-04 DIAGNOSIS — Z3A.12 12 WEEKS GESTATION OF PREGNANCY: ICD-10-CM

## 2022-04-04 PROCEDURE — 99214 OFFICE O/P EST MOD 30 MIN: CPT | Performed by: OBSTETRICS & GYNECOLOGY

## 2022-04-04 PROCEDURE — 87591 N.GONORRHOEAE DNA AMP PROB: CPT | Performed by: OBSTETRICS & GYNECOLOGY

## 2022-04-04 PROCEDURE — 87491 CHLMYD TRACH DNA AMP PROBE: CPT | Performed by: OBSTETRICS & GYNECOLOGY

## 2022-04-06 LAB
C TRACH DNA SPEC QL NAA+PROBE: NEGATIVE
N GONORRHOEA DNA SPEC QL NAA+PROBE: NEGATIVE

## 2022-04-07 ENCOUNTER — ROUTINE PRENATAL (OUTPATIENT)
Dept: PERINATAL CARE | Facility: OTHER | Age: 32
End: 2022-04-07
Payer: COMMERCIAL

## 2022-04-07 VITALS
HEART RATE: 98 BPM | BODY MASS INDEX: 43.22 KG/M2 | DIASTOLIC BLOOD PRESSURE: 88 MMHG | HEIGHT: 67 IN | WEIGHT: 275.4 LBS | SYSTOLIC BLOOD PRESSURE: 139 MMHG

## 2022-04-07 DIAGNOSIS — Z36.82 ENCOUNTER FOR ANTENATAL SCREENING FOR NUCHAL TRANSLUCENCY: Primary | ICD-10-CM

## 2022-04-07 DIAGNOSIS — O99.211 OBESITY AFFECTING PREGNANCY IN FIRST TRIMESTER: ICD-10-CM

## 2022-04-07 DIAGNOSIS — Z3A.13 13 WEEKS GESTATION OF PREGNANCY: ICD-10-CM

## 2022-04-07 PROCEDURE — 76813 OB US NUCHAL MEAS 1 GEST: CPT | Performed by: OBSTETRICS & GYNECOLOGY

## 2022-04-07 PROCEDURE — 99242 OFF/OP CONSLTJ NEW/EST SF 20: CPT | Performed by: OBSTETRICS & GYNECOLOGY

## 2022-04-07 NOTE — LETTER
2022    SANTI Clemente  [address]     Patient: Shen Pagan   YOB: 1990   Date of Visit: 2022   Gestational age 12w2d   Nikkyjanell Gianna of this communication: Routine       Dear Asael Smith,    This patient was seen recently in our  office  The content of my evaluation today is in the ultrasound report under "OB Procedures" tab  Please don't hesitate to contact our office with any concerns or questions       Sincerely,      Mayte Vee MD  Attending Physician, [facility]

## 2022-04-07 NOTE — PROGRESS NOTES
[address]: Ms Patricia Rodriguez was seen today for nuchal translucency ultrasound  See ultrasound report under "OB Procedures" tab  Review of Systems   Constitutional: Negative for chills and fever  Respiratory: Negative for cough and shortness of breath  Cardiovascular: Negative for chest pain and leg swelling  Gastrointestinal: Negative for abdominal pain  Genitourinary: Negative for vaginal bleeding  Physical Exam  Constitutional:       General: She is not in acute distress  Appearance: Normal appearance  She is not ill-appearing, toxic-appearing or diaphoretic  HENT:      Head: Normocephalic and atraumatic  Nose: No congestion or rhinorrhea  Eyes:      General: No scleral icterus  Right eye: No discharge  Left eye: No discharge  Extraocular Movements: Extraocular movements intact  Conjunctiva/sclera: Conjunctivae normal    Pulmonary:      Effort: Pulmonary effort is normal  No respiratory distress  Musculoskeletal:      Cervical back: Normal range of motion  Skin:     Coloration: Skin is not jaundiced or pale  Findings: No erythema, lesion or rash  Neurological:      General: No focal deficit present  Mental Status: She is alert and oriented to person, place, and time  Psychiatric:         Mood and Affect: Mood normal          Behavior: Behavior normal          Please don't hesitate to contact our office with any concerns or questions    Evelio Johnson MD

## 2022-04-07 NOTE — PATIENT INSTRUCTIONS
Thank you for choosing us for your  care today  If you have any questions about your ultrasound or care, please do not hesitate to contact us or your primary obstetrician  Some general instructions for your pregnancy are:     Protect against coronavirus: get vaccinated - pregnant women are increased risk of severe COVID  Notify your primary care doctor if you have any symptoms   Exercise: Aim for 22 minutes per day (150 minutes per week) of regular exercise  Walking is great!  Nutrition: aim for calcium-rich and iron-rich foods as well as healthy sources of protein   Learn about Preeclampsia: preeclampsia is a common, serious high blood pressure complication in pregnancy  A blood pressure of 081VYKB (systolic or top number) or 58AITZ (diastolic or bottom number) is not normal and needs evaluation by your doctor  Aspirin is sometimes prescribed in early pregnancy to prevent preeclampsia in women with risk factors - ask your obstetrician if you should be on this medication   If you smoke, try to reduce how many cigarettes you smoke or try to quit completely  Do not vape   Other warning signs to watch out for in pregnancy or postpartum: chest pain, obstructed breathing or shortness of breath, seizures, thoughts of hurting yourself or your baby, bleeding, a painful or swollen leg, fever, or headache (see [facility] POST-BIRTH Warning Signs campaign)  If these happen call 911  Itching is also not normal in pregnancy and if you experience this, especially over your hands and feet, potentially worse at night, notify your doctors        [facility]: [phone] or [phone]

## 2022-04-29 NOTE — PATIENT INSTRUCTIONS
Thank you for visiting [facility]  You may be invited to complete a survey about you visit  Your responses will help us improve care we provide  A 10 means the care you received at your visit met your expectations  If you are unable to give a 10 please list reasons so we can work on improving your patient experience  COVID-19 and Pregnancy   AMBULATORY CARE:   What you need to know about coronavirus disease 2019 (COVID-19) and pregnancy:  Pregnancy increases your risk for severe COVID-19 illness  COVID-19 can also lead to  delivery of your baby  Most babies who become infected with the new virus do not develop serious effects, but some do  It is important for you and your baby to stay safe during pregnancy and delivery  Signs and symptoms of COVID-19 in newborns: The following signs and symptoms may be from COVID-19, but they are also common in newborns  Your 's healthcare provider may recommend testing to confirm or rule out COVID-19  Your  may need a second test if the first is negative  · Fever    · Not moving arms or legs much, or being too sleepy to feed    · A runny nose or cough    · Fast breathing, or trouble breathing    · Vomiting, diarrhea, or not feeding well    Call your local emergency number (911 in the [address]) if:   · You have trouble breathing or shortness of breath at rest     · You have chest pain or pressure that lasts longer than 5 minutes  · You become confused or hard to wake  · Your lips or face are blue  Seek care immediately if:   · You have a fever of 104°F (40°C) or higher  Call your doctor if:   · You have symptoms of COVID-19  · You have questions or concerns about your condition or care  How the 2019 coronavirus spreads: The virus spreads quickly and easily  The virus can be passed starting 2 to 3 days before symptoms begin or before a positive test if symptoms never begin    · Droplets are the main way all coronaviruses spread  The virus travels in droplets that form when a person talks, sings, coughs, or sneezes  The droplets can also float in the air for minutes or hours  Infection happens when you breathe in the droplets or get them in your eyes or nose  Close personal contact with an infected person increases your risk for infection  This means being within 6 feet (2 meters) of the person for at least 15 minutes over 24 hours  · Person-to-person contact can spread the virus  For example, a person with the virus on his or her hands can spread it by shaking hands with someone  · The virus can stay on objects and surfaces for up to 3 days  You may become infected by touching the object or surface and then touching your eyes or mouth  Protect yourself and your baby while you are pregnant: If you have COVID-19 during your pregnancy, healthcare providers will monitor you and your baby closely  Work with your healthcare provider or obstetrician  If you do not have either, experts recommend you contact a local [facility] health center or health department  The following measures can help keep you and your baby safe  Continue even after you are vaccinated against COVID-19  These are still the best ways to prevent infection:  · Wash your hands throughout the day  Use soap and water  Rub your soapy hands together, lacing your fingers  Wash the front and back of each hand, and in between your fingers  Use the fingers of one hand to scrub under the fingernails of the other hand  Wash for at least 20 seconds  Rinse with warm, running water for several seconds  Dry your hands with a clean towel or paper towel  Use hand  that contains alcohol if soap and water are not available  · Protect yourself from sneezes and coughs  Turn your face away and cover your mouth and nose if you are around someone who is sneezing or coughing  This helps protect you from the person's droplets   Cover your mouth and nose with a tissue when you need to sneeze or cough  Use the bend of your arm if you do not have a tissue  Throw the tissue away  Then wash your hands or use hand   · Try not to touch your face  If you get the virus on your hands, you can transfer it to your eyes, nose, or mouth and become infected  You can also transfer it to objects, surfaces, or people  · Follow worldwide, national, and local social distancing guidelines  Keep at least 6 feet (2 meters) between you and others  · Wear a face covering (mask) when needed  Use a disposable non-medical mask, or make a cloth covering with at least 2 layers  You can also create layers by putting a cloth covering over a disposable non-medical mask  Cover your mouth and your nose  Continue social distancing and washing your hands often  Do not put a face shield or covering on your   These increase the risk for sudden infant death syndrome (SIDS)  · Clean and disinfect high-touch surfaces and objects often  Use disinfecting wipes, or make a solution of 4 teaspoons of bleach in 1 quart (4 cups) of water  · Stay home if you are sick or think you may have COVID-19  It is important to stay home if you are waiting for a testing appointment or for test results  · Avoid or limit close physical contact with anyone who does not live in your home  Do not shake hands with, hug, or kiss a person as a greeting  If you must use public transportation (such as a bus or subway), try to sit or stand away from others  Wear your face covering  · Avoid in-person gatherings and crowds  Attend virtually if possible  What you can do to have a healthy pregnancy:   · Keep all prenatal and  appointments  You may be able to have certain prenatal appointments without having to go into the provider's office  Some providers offer phone, video, or other types of appointments  You may also be able to get prescriptions for a few months at a time   This will help lower the number of trips you need to make to the pharmacy for refills  If you do need to go into your provider's office, take precautions  Put a face covering on before you go into the office  Do not stand or sit within 6 feet (2 meters) of anyone in the waiting room, if possible  Do not stand or sit near anyone who is not wearing a face covering  · Get recommended vaccines  Your healthcare provider can tell you if you need vaccines not listed below, and when to get them  ? COVID-19 vaccines are given as a shot in 1 or 2 doses  Vaccination is recommended for everyone 5 years or older  One 2-dose vaccine is fully approved for those 12 or older  This vaccine also has an emergency use authorization (EUA) for children 11to 13years old  No vaccine is currently available for children younger than 5 years  A booster (additional) dose is given to help the immune system continue to protect against severe COVID-19  § A booster is recommended for all adults 18 or older  The booster can be a different brand of the COVID-19 vaccine than you originally received  The timing for the booster depends on the type of vaccine you received:    § 1-dose vaccine: The booster is given at least 2 months after you received the vaccine  § 2-dose vaccine: The booster is given at least 6 months after the second dose   § A booster can be given to adolescents 12to 16years old  Only 1 COVID-19 vaccine has an EUA for adolescent boosters  The booster is given at least 6 months after the second dose of the original vaccine series  · Get the influenza (flu) vaccine  Try to get the vaccine as soon as recommended each year, usually starting in September or October  · Get the Tdap vaccine  The Tdap vaccine protects you from tetanus, diphtheria, and pertussis  If possible, get the vaccine during weeks 27 to 36 of your pregnancy  You should get a dose of Tdap with each pregnancy      Take prenatal vitamins as directed  Your prenatal vitamins should contain folic acid  You need about 600 micrograms (mcg) of folic acid each day during pregnancy  Folic acid helps to form your baby's brain and spinal cord in early pregnancy  Eat a variety of healthy foods  Healthy foods are important, even if you take a prenatal vitamin  Healthy foods contain nutrients that help keep your immune system strong  Examples of healthy foods include vegetables, fruits, whole-grain breads and cereals, lean meats and poultry, fish, low-fat dairy products, and cooked beans  Do not have raw, undercooked, or unpasteurized food or drinks  Unpasteurized foods are foods that have not gone through the heating process (pasteurization) that destroys bacteria  Your healthcare provider or a dietitian can help you create healthy meal plans  Talk to your healthcare provider about exercise  Moderate exercise can help keep your immune system strong  Your healthcare provider can help you plan an exercise program that is safe for you during pregnancy  You may need to exercise at home if you cannot exercise outdoors, such as walking in a park  If you want to do pregnancy yoga or other group activities, be safe  Stay at least 6 feet (2 meters) away from others in the class, and the instructor  Wash your hands before you leave the building  Follow the facility's instructions for preventing infections  Try to lower your stress  You may be feeling more stressed than usual because of the COVID-19 outbreak  You may also feel stress from not being able to share your pregnancy with others  For example, you may not be able to have someone with you during prenatal visits or ultrasounds  Talk to your healthcare providers about ways to manage stress during this time  Pick 1 or 2 times a day to watch the news  Constant news watching about COVID-19 can increase your stress levels   Set a sleep schedule to go to bed and wake up at the same times each day     Do not smoke cigarettes, drink alcohol, or use drugs  Nicotine and other chemicals in cigarettes and cigars can harm your baby and your health  Alcohol can increase your risk for a miscarriage  Your baby may also be born too small or have other health problems  Certain drugs can be passed to your baby before he or she is born  Some can be passed through breast milk  It is best to quit cigarettes, alcohol, and drugs before you become pregnant and not start again after your baby is born  Ask your healthcare provider for information if you currently use any of these and need help to quit  Protect your  during delivery and while you are in the hospital:  It is not known for sure if an unborn baby can be infected with the virus that causes COVID-19  Some newborns have tested positive for the virus  The newborns may have been infected before, during, or after birth  The greatest risk is for a  to be in close contact with an infected person  Your baby may be tested for the virus soon after being born if you have COVID-23  He or she may be tested again before you leave the hospital  This depends on whether your baby has any signs or symptoms of COVID-19  You will be able to make choices for you and your baby during your hospital stay  Talk to healthcare providers about the following:  · Ask about temporary separation if you have COVID-19  Temporary separation means your  is moved to a different room from you  You will be able to make the decision if you want to do this  Separation will help lower your 's risk for being infected  You will still be able to give your  breast milk  You may need to pump the milk  Someone who does not have COVID-19 will then feed the pumped milk to your   You may instead choose to have your baby brought to you when you want to breastfeed  Wash your hands and the skin around your nipples before you hold your baby   Wear a face covering while you breastfeed  · Be careful if you have COVID-19 and do not choose temporary separation  Healthcare providers will keep your  at least 6 feet (2 meters) away from you as much as possible  Your  may be placed in an incubator  The incubator will help protect your  from infection  Always wash your hands and put on a face covering when you hold, touch, or have close contact with your   · Ask about visitors  The facility may not be allowing any visitors to newborns during this time  If you are allowed visitors, you may need to limit how many you can have at a time  Do not allow anyone who has known or suspected COVID-19 to visit  Even without signs or symptoms, the person can infect your  or others in the room  All visitors need to wash their hands and put on clean face coverings before entering your room  The face covering needs to stay on during the whole visit  Do not let anyone take the face covering down to make faces at your baby, talk, sneeze, or cough  Do not let anyone kiss you or your baby  Protect your  at home:   · You can choose to continue temporary separation if you have COVID-19  You can do this if an adult who does not have COVID-19 can care for your   Your healthcare provider can give you instructions on how to do this safely at home  Only have close contact with your  when needed  Remember to wash your hands and put on a clean face covering first  You may need to continue pumping your breast milk  A healthy adult can feed the pumped breast milk to your   You may instead choose to have your baby brought to you when you want to breastfeed  Take precautions to keep your baby safe  Wash your hands and the skin around your nipples before you hold your baby  You will also need to wear a face covering while you breastfeed  · Use face coverings safely    Everyone who has COVID-19 needs to wear a clean face covering while being within 6 feet (2 meters) of your   This includes other children in your home who are 2 years or older  Do not put a face covering or plastic face shield on your   Any covering increases your 's risk for sudden infant death syndrome (SIDS)  Do not use coverings on children younger than 2 years or on anyone who has breathing problems or cannot remove it  · Be careful about visitors  Continue precautions you used in the hospital  Do not allow anyone who has known or suspected COVID-19 to come over to see your   Have visitors put on clean face coverings before they enter your home  Have them wash their hands as soon as they come in  The face covering needs to stay on during the whole visit  · Keep all checkup appointments  You may be able to have some appointments by phone or video meeting  Other appointments will need to be in person, such as for vaccines  Vaccines are normally given to babies at certain ages  Until COVID-19 is under control, your 's provider will give you a vaccine schedule  It is important for your  to get all recommended vaccines  What you need to know about breastfeeding:  Breastfeeding for the first 6 months decreases your baby's risk for respiratory (lung) infections, allergies, asthma, and stomach problems  Breast milk also helps your baby develop a strong immune system  Breast milk is considered safe, even if you have COVID-19  Experts currently believe the virus that causes COVID-19 does not spread in breast milk  Do the following to help protect your baby:  · Wash your hands before every breastfeeding or pumping session  Even if you do not have COVID-19, you can transfer the virus from your hands to your baby or the pump  Use soap and water to wash your hands whenever possible  Use hand  that contains alcohol if soap and water are not available  · Clean and sanitize your breast pump after each use    Follow the 's directions for cleaning and sanitizing the pump  It is important not to use it until it is clean and sanitized  · If you have COVID-19:      ? Wear a face covering while you breastfeed or pump  This will help prevent you from passing the virus through droplets when you talk, cough, sneeze, or sing  The virus can stay on surfaces such as a breast pump for hours to days  ? Have someone who is not infected bottle feed your baby, if possible  Have the person wash his or her hands with soap and water before each feeding  The person can feed your  pumped breast milk or formula  Follow up with your doctor or obstetrician as directed:  Write down your questions so you remember to ask them during your visits  For more information:   · Centers for Disease Control and Prevention  [address] , [address]  Phone: 9- 154 - 666-0326  Web Address: [employer] com     ©  [employer]  Information is for End User's use only and may not be sold, redistributed or otherwise used for commercial purposes  All illustrations and images included in CareNotes® are the copyrighted property of A D A M , Inc  or [facility] [employer]   The above information is an  only  It is not intended as medical advice for individual conditions or treatments  Talk to your doctor, nurse or pharmacist before following any medical regimen to see if it is safe and effective for you  Pregnancy at [address] to [address]:   What changes are happening with my body? Now that you are in your second trimester, you have more energy  You may also be feeling hungrier than usual  You may be gaining about ½ to 1 pound a week, and your pregnancy is beginning to show  You may need to start wearing maternity clothes  As your baby gets larger, you may have other symptoms  These may include body aches or stretch marks on your abdomen, breasts, thighs, or buttocks    How do I care for myself at this stage of my pregnancy? · Eat a variety of healthy foods  Healthy foods include fruits, vegetables, whole-grain breads, low-fat dairy foods, beans, lean meats, and fish  Drink liquids as directed  Ask how much liquid to drink each day and which liquids are best for you  Limit caffeine to less than 200 milligrams each day  Limit your intake of fish to 2 servings each week  Choose fish low in mercury such as canned light tuna, shrimp, salmon, cod, or tilapia  Do not  eat fish high in mercury such as swordfish, tilefish, mony mackerel, and shark  · Take prenatal vitamins as directed  Your need for certain vitamins and minerals, such as folic acid, increases during pregnancy  Prenatal vitamins provide some of the extra vitamins and minerals you need  Prenatal vitamins may also help to decrease the risk of certain birth defects  · Talk to your healthcare provider about exercise  Moderate exercise can help you stay fit  Your healthcare provider will help you plan an exercise program that is safe for you during pregnancy  · Do not smoke  Smoking increases your risk of a miscarriage and other health problems during your pregnancy  Smoking can cause your baby to be born too early or weigh less at birth  Ask your healthcare provider for information if you need help quitting  · Do not drink alcohol  Alcohol passes from your body to your baby through the placenta  It can affect your baby's brain development and cause fetal alcohol syndrome (FAS)  FAS is a group of conditions that causes mental, behavior, and growth problems  · Talk to your healthcare provider before you take any medicines  Many medicines may harm your baby if you take them when you are pregnant  Do not take any medicines, vitamins, herbs, or supplements without first talking to your healthcare provider  Never use illegal or street drugs (such as marijuana or cocaine) while you are pregnant      What are some safety tips during pregnancy? · Avoid hot tubs and saunas  Do not use a hot tub or sauna while you are pregnant, especially during your first trimester  Hot tubs and saunas may raise your baby's temperature and increase the risk of birth defects  · Avoid toxoplasmosis  This is an infection caused by eating raw meat or being around infected cat feces  It can cause birth defects, miscarriages, and other problems  Wash your hands after you touch raw meat  Make sure any meat is well-cooked before you eat it  Avoid raw eggs and unpasteurized milk  Use gloves or ask someone else to clean your cat's litter box while you are pregnant  What changes are happening with my baby? By 22 weeks, your baby is about 8 inches long from the top of the head to the rump (baby's bottom)  Your baby also weighs about 1 pound  Your baby is becoming much more active  You may be able to feel the baby move inside you now  The first movements may not be that noticeable  They may feel like a fluttering sensation  As time goes on, your baby's movements will become stronger and more noticeable  What do I need to know about prenatal care? During the first 28 weeks of your pregnancy, you will see your healthcare provider once a month  Your healthcare provider will check your blood pressure and weight  You may also need the following:  · A urine test  may also be done to check for sugar and protein  These can be signs of gestational diabetes or infection  Protein in your urine may also be a sign of preeclampsia  Preeclampsia is a condition that can develop during week 20 or later of your pregnancy  It causes high blood pressure, and it can cause problems with your kidneys and other organs  · Fundal height  is a measurement of your uterus to check your baby's growth  This number is usually the same as the number of weeks that you have been pregnant  · A fetal ultrasound  shows pictures of your baby inside your uterus   It shows your baby's development  The movement and position of your baby can also be seen  Your healthcare provider may be able to tell you what your baby's gender is during the ultrasound  · Your baby's heart rate  will be checked  When should I seek immediate care? · You develop a severe headache that does not go away  · You have new or increased vision changes, such as blurred or spotted vision  · You have new or increased swelling in your face or hands  · You have vaginal spotting or bleeding  · Your water broke or you feel warm water gushing or trickling from your vagina  When should I call my doctor or obstetrician? · You have abdominal cramps, pressure, or tightening  · You have a change in vaginal discharge  · You cannot keep food or drinks down, and you are losing weight  · You have chills or a fever  · You have vaginal itching, burning, or pain  · You have yellow, green, white, or foul-smelling vaginal discharge  · You have pain or burning when you urinate, less urine than usual, or pink or bloody urine  · You have questions or concerns about your condition or care  CARE AGREEMENT:   You have the right to help plan your care  Learn about your health condition and how it may be treated  Discuss treatment options with your healthcare providers to decide what care you want to receive  You always have the right to refuse treatment  The above information is an  only  It is not intended as medical advice for individual conditions or treatments  Talk to your doctor, nurse or pharmacist before following any medical regimen to see if it is safe and effective for you  © Copyright [employer] 2022 Information is for End User's use only and may not be sold, redistributed or otherwise used for commercial purposes   All illustrations and images included in CareNotes® are the copyrighted property of A D A M , Inc  or [facility]  Pregnancy at 15 to [address] [address] YOU NEED TO KNOW:   What changes are happening in my body? Now that you are in your second trimester, you have more energy  You may also feel hungrier than usual  You may start to experience other symptoms, such as heartburn or dizziness  You may be gaining about ½ to 1 pound a week, and your pregnancy is beginning to show  You may need to start wearing maternity clothes  How do I care for myself at this stage of my pregnancy? · Manage heartburn  by eating 4 or 5 small meals each day instead of large meals  Avoid spicy foods  Avoid eating right before bedtime  · Manage nausea and vomiting  Avoid fatty and spicy foods  Eat small meals throughout the day instead of large meals  Jessica may help to decrease nausea  Ask your healthcare provider about other ways of decreasing nausea and vomiting  · Eat a variety of healthy foods  Healthy foods include fruits, vegetables, whole-grain breads, low-fat dairy foods, beans, lean meats, and fish  Drink liquids as directed  Ask how much liquid to drink each day and which liquids are best for you  Limit caffeine to less than 200 milligrams each day  Limit your intake of fish to 2 servings each week  Choose fish low in mercury such as canned light tuna, shrimp, salmon, cod, or tilapia  Do not  eat fish high in mercury such as swordfish, tilefish, mony mackerel, and shark  · Take prenatal vitamins as directed  Your need for certain vitamins and minerals, such as folic acid, increases during pregnancy  Prenatal vitamins provide some of the extra vitamins and minerals you need  Prenatal vitamins may also help to decrease the risk of certain birth defects  · Do not smoke  Smoking increases your risk of a miscarriage and other health problems during your pregnancy  Smoking can cause your baby to be born too early or weigh less at birth  Ask your healthcare provider for information if you need help quitting  · Do not drink alcohol    Alcohol passes from your body to your baby through the placenta  It can affect your baby's brain development and cause fetal alcohol syndrome (FAS)  FAS is a group of conditions that causes mental, behavior, and growth problems  · Talk to your healthcare provider before you take any medicines  Many medicines may harm your baby if you take them when you are pregnant  Do not take any medicines, vitamins, herbs, or supplements without first talking to your healthcare provider  Never use illegal or street drugs (such as marijuana or cocaine) while you are pregnant  What are some safety tips during pregnancy? · Avoid hot tubs and saunas  Do not use a hot tub or sauna while you are pregnant, especially during your first trimester  Hot tubs and saunas may raise your baby's temperature and increase the risk of birth defects  · Avoid toxoplasmosis  This is an infection caused by eating raw meat or being around infected cat feces  It can cause birth defects, miscarriages, and other problems  Wash your hands after you touch raw meat  Make sure any meat is well-cooked before you eat it  Avoid raw eggs and unpasteurized milk  Use gloves or ask someone else to clean your cat's litter box while you are pregnant  What changes are happening with my baby? By 18 weeks, your baby may be about 6 inches long from the top of the head to the rump (baby's bottom)  Your baby may weigh about 11 ounces  You may be able to feel your baby's movement at about 18 weeks or later  The first movements may not be that noticeable  They may feel like a fluttering sensation  Your baby also makes sucking movements and can hear certain sounds  What do I need to know about prenatal care? During the first 28 weeks of your pregnancy, you will see your healthcare provider once a month  Your healthcare provider will check your blood pressure and weight   You may also need any of the following:  · A urine test  may also be done to check for sugar and protein  These can be signs of gestational diabetes or infection  · A blood test  may be done to check for anemia (low iron level)  · Fundal height check  is a measurement of your uterus to check your baby's growth  This number is usually the same as the number of weeks that you have been pregnant  · An ultrasound  may be done to check your baby's development  Your healthcare provider may be able to tell you what your baby's gender is during the ultrasound  · Your baby's heart rate  will be checked  When should I seek immediate care? · You have pain or cramping in your abdomen or low back  · You have heavy vaginal bleeding or clotting  · You pass material that looks like tissue or large clots  Collect the material and bring it with you  When should I call my doctor or obstetrician? · You cannot keep food or drinks down, and you are losing weight  · You have light bleeding  · You have chills or a fever  · You have vaginal itching, burning, or pain  · You have yellow, green, white, or foul-smelling vaginal discharge  · You have pain or burning when you urinate, less urine than usual, or pink or bloody urine  · You have questions or concerns about your condition or care  CARE AGREEMENT:   You have the right to help plan your care  Learn about your health condition and how it may be treated  Discuss treatment options with your healthcare providers to decide what care you want to receive  You always have the right to refuse treatment  The above information is an  only  It is not intended as medical advice for individual conditions or treatments  Talk to your doctor, nurse or pharmacist before following any medical regimen to see if it is safe and effective for you  © Copyright [address] 2022 Information is for End User's use only and may not be sold, redistributed or otherwise used for commercial purposes   All illustrations and images included in CareNotes® are the copyrighted property of A D A M , Inc  or [address] [place], [place] [address]iron hernando

## 2022-05-02 ENCOUNTER — ROUTINE PRENATAL (OUTPATIENT)
Dept: OBGYN CLINIC | Facility: MEDICAL CENTER | Age: 32
End: 2022-05-02

## 2022-05-02 VITALS
HEIGHT: 67 IN | WEIGHT: 273 LBS | SYSTOLIC BLOOD PRESSURE: 120 MMHG | DIASTOLIC BLOOD PRESSURE: 82 MMHG | BODY MASS INDEX: 42.85 KG/M2

## 2022-05-02 DIAGNOSIS — O99.212 OBESITY AFFECTING PREGNANCY IN SECOND TRIMESTER: Primary | ICD-10-CM

## 2022-05-02 DIAGNOSIS — Z3A.16 16 WEEKS GESTATION OF PREGNANCY: ICD-10-CM

## 2022-05-02 PROCEDURE — PNV: Performed by: NURSE PRACTITIONER

## 2022-05-02 NOTE — PROGRESS NOTES
Denies loss of fluid, vaginal bleeding and abdominal pain  Uncertain if she is feeling fetal movement yet  Tolerating prenatal vitamin well  Met with  Center for early ultrasound  Has level 2 ultrasound scheduled  Denies any questions or concerns at today's visit  BP: 120/82  Weight: +1lb  Plan  -continue prenatal vitamins daily  -level 2 ultrasound scheduled for 22  -common discomforts of pregnancy and precautions reviewed  Signs and symptoms report reviewed    Written information provided about COVID 19    RTO 4 weeks f/u US

## 2022-05-26 ENCOUNTER — ROUTINE PRENATAL (OUTPATIENT)
Dept: PERINATAL CARE | Facility: OTHER | Age: 32
End: 2022-05-26
Payer: COMMERCIAL

## 2022-05-26 VITALS
HEIGHT: 67 IN | HEART RATE: 123 BPM | SYSTOLIC BLOOD PRESSURE: 138 MMHG | DIASTOLIC BLOOD PRESSURE: 92 MMHG | BODY MASS INDEX: 43.47 KG/M2 | WEIGHT: 277 LBS

## 2022-05-26 DIAGNOSIS — Z36.86 ENCOUNTER FOR ANTENATAL SCREENING FOR CERVICAL LENGTH: ICD-10-CM

## 2022-05-26 DIAGNOSIS — O44.42 LOW-LYING PLACENTA WITHOUT HEMORRHAGE, SECOND TRIMESTER: ICD-10-CM

## 2022-05-26 DIAGNOSIS — Z3A.20 20 WEEKS GESTATION OF PREGNANCY: ICD-10-CM

## 2022-05-26 DIAGNOSIS — O43.192 MARGINAL INSERTION OF UMBILICAL CORD AFFECTING MANAGEMENT OF MOTHER IN SECOND TRIMESTER: ICD-10-CM

## 2022-05-26 DIAGNOSIS — E66.01 MATERNAL MORBID OBESITY IN SECOND TRIMESTER, ANTEPARTUM (HCC): Primary | ICD-10-CM

## 2022-05-26 DIAGNOSIS — O99.212 MATERNAL MORBID OBESITY IN SECOND TRIMESTER, ANTEPARTUM (HCC): Primary | ICD-10-CM

## 2022-05-26 PROCEDURE — 99214 OFFICE O/P EST MOD 30 MIN: CPT | Performed by: OBSTETRICS & GYNECOLOGY

## 2022-05-26 PROCEDURE — 76817 TRANSVAGINAL US OBSTETRIC: CPT | Performed by: OBSTETRICS & GYNECOLOGY

## 2022-05-26 PROCEDURE — 76811 OB US DETAILED SNGL FETUS: CPT | Performed by: OBSTETRICS & GYNECOLOGY

## 2022-05-26 NOTE — PROGRESS NOTES
Ultrasound Probe Disinfection    A transvaginal ultrasound was performed  Prior to use, disinfection was performed with High Level Disinfection Process ([employer])  Probe serial number F2: [id number] was used        Cozette Child  05/26/22  1:33 PM

## 2022-05-26 NOTE — LETTER
May 26, 2022     Alessio Bhakta MD  [address]    Patient: Johana Covarrubias   YOB: 1990   Date of Visit: 5/26/2022       Dear Dr Jalyn Ward: Thank you for referring Gorge Orozco to me for evaluation  Below are my notes for this consultation  If you have questions, please do not hesitate to call me  I look forward to following your patient along with you  Sincerely,        Bryan Cross MD        CC: No Recipients  Bryan Cross MD  5/25/2022  5:45 PM  Sign when Signing Visit  Please refer to the [facility] ultrasound report in Ob Procedures for additional information regarding today's visit

## 2022-05-31 ENCOUNTER — ROUTINE PRENATAL (OUTPATIENT)
Dept: OBGYN CLINIC | Facility: MEDICAL CENTER | Age: 32
End: 2022-05-31

## 2022-05-31 VITALS — SYSTOLIC BLOOD PRESSURE: 130 MMHG | DIASTOLIC BLOOD PRESSURE: 86 MMHG | WEIGHT: 277.4 LBS | BODY MASS INDEX: 43.45 KG/M2

## 2022-05-31 DIAGNOSIS — R03.0 ELEVATED BP WITHOUT DIAGNOSIS OF HYPERTENSION: ICD-10-CM

## 2022-05-31 DIAGNOSIS — O99.212 MATERNAL MORBID OBESITY IN SECOND TRIMESTER, ANTEPARTUM (HCC): ICD-10-CM

## 2022-05-31 DIAGNOSIS — O44.42 LOW-LYING PLACENTA WITHOUT HEMORRHAGE, SECOND TRIMESTER: ICD-10-CM

## 2022-05-31 DIAGNOSIS — O43.192 MARGINAL INSERTION OF UMBILICAL CORD AFFECTING MANAGEMENT OF MOTHER IN SECOND TRIMESTER: ICD-10-CM

## 2022-05-31 DIAGNOSIS — Z34.92 SECOND TRIMESTER PREGNANCY: Primary | ICD-10-CM

## 2022-05-31 DIAGNOSIS — E66.01 MATERNAL MORBID OBESITY IN SECOND TRIMESTER, ANTEPARTUM (HCC): ICD-10-CM

## 2022-05-31 DIAGNOSIS — Z3A.20 20 WEEKS GESTATION OF PREGNANCY: ICD-10-CM

## 2022-05-31 PROCEDURE — PNV: Performed by: OBSTETRICS & GYNECOLOGY

## 2022-05-31 NOTE — PROGRESS NOTES
Assessment  28 y o   at 20w6d presenting for routine prenatal visit  Plan  Diagnoses and all orders for this visit:    Second trimester pregnancy  20 weeks gestation of pregnancy  - Second trimester precautions  - NIPT normal, AFP pending coverage (ordered)  - Level 2 reviewed  - Return in 4wks for PN    Low-lying placenta without hemorrhage, second trimester  Marginal insertion of umbilical cord affecting management of mother in second trimester  - Follows with MFM  - Continue US surveillance for resolution (10 8mm from os)    Elevated BP without diagnosis of hypertension  - Elevated BP in MFM  - Routine monitoring    ____________________________________________________________        Subjective    Zee Monique Cuellar is a 28 y o   at 23w11d who presents for routine prenatal visit  She is reporting increasing heartburn  Tums helps  Denies contractions, loss of fluid, or vaginal bleeding  She feels some fetal movements, not distinct yet  Pregnancy Problems:  Patient Active Problem List   Diagnosis    History of COVID-19    Obesity affecting pregnancy in second trimester    COVID-19 vaccine series completed    Maternal morbid obesity in second trimester, antepartum ([address] )    Low-lying placenta without hemorrhage, second trimester    Marginal insertion of umbilical cord affecting management of mother in second trimester         Objective  /86   Wt 126 kg (277 lb 6 4 oz)   LMP 2022   BMI 43 45 kg/m²     FHT: 138 BPM   Uterine Size: size equals dates     Physical Exam:  Physical Exam  Constitutional:       General: She is not in acute distress  Appearance: Normal appearance  She is well-developed  She is not ill-appearing, toxic-appearing or diaphoretic  HENT:      Head: Normocephalic and atraumatic  Eyes:      General: No scleral icterus  Right eye: No discharge  Left eye: No discharge        Conjunctiva/sclera: Conjunctivae normal    Pulmonary:      Effort: Pulmonary effort is normal  No accessory muscle usage or respiratory distress  Abdominal:      General: There is distension (gravid)  Tenderness: There is no abdominal tenderness  There is no guarding or rebound  Skin:     General: Skin is warm and dry  Coloration: Skin is not jaundiced  Findings: No bruising, erythema or rash  Neurological:      Mental Status: She is alert  Psychiatric:         Mood and Affect: Mood normal          Behavior: Behavior normal          Thought Content:  Thought content normal          Judgment: Judgment normal

## 2022-06-27 ENCOUNTER — ROUTINE PRENATAL (OUTPATIENT)
Dept: OBGYN CLINIC | Facility: MEDICAL CENTER | Age: 32
End: 2022-06-27

## 2022-06-27 VITALS — BODY MASS INDEX: 43.54 KG/M2 | WEIGHT: 278 LBS | DIASTOLIC BLOOD PRESSURE: 80 MMHG | SYSTOLIC BLOOD PRESSURE: 140 MMHG

## 2022-06-27 DIAGNOSIS — E66.01 MATERNAL MORBID OBESITY IN SECOND TRIMESTER, ANTEPARTUM (HCC): ICD-10-CM

## 2022-06-27 DIAGNOSIS — O44.42 LOW-LYING PLACENTA WITHOUT HEMORRHAGE, SECOND TRIMESTER: ICD-10-CM

## 2022-06-27 DIAGNOSIS — Z34.93 THIRD TRIMESTER PREGNANCY: ICD-10-CM

## 2022-06-27 DIAGNOSIS — O99.212 MATERNAL MORBID OBESITY IN SECOND TRIMESTER, ANTEPARTUM (HCC): ICD-10-CM

## 2022-06-27 DIAGNOSIS — O09.92 SUPERVISION OF HIGH RISK PREGNANCY IN SECOND TRIMESTER: Primary | ICD-10-CM

## 2022-06-27 DIAGNOSIS — O43.192 MARGINAL INSERTION OF UMBILICAL CORD AFFECTING MANAGEMENT OF MOTHER IN SECOND TRIMESTER: ICD-10-CM

## 2022-06-27 DIAGNOSIS — Z3A.24 24 WEEKS GESTATION OF PREGNANCY: ICD-10-CM

## 2022-06-27 DIAGNOSIS — O10.919 CHRONIC HYPERTENSION AFFECTING PREGNANCY: ICD-10-CM

## 2022-06-27 PROCEDURE — PNV: Performed by: STUDENT IN AN ORGANIZED HEALTH CARE EDUCATION/TRAINING PROGRAM

## 2022-06-27 NOTE — ASSESSMENT & PLAN NOTE
- Based on her blood pressures at GYN annuals in  and , it appears she has underlying chronic hypertension   - We reviewed her baseline labs this pregnancy  She has already started aspirin   - Current medications: None, but if she persistently has elevated Bps above 140/90 we discussed starting labetalol in pregnancy per updated guidelines  - Reviewed signs and symptoms of superimposed pre-eclampsia  -  surveillance: Twice weekly NSTs starting at 32 weeks; Growth ultrasound every 4 weeks after 28 weeks;   - Delivery timing: Delivery timing between [id number] depending on blood pressure control

## 2022-06-27 NOTE — PROGRESS NOTES
Routine Prenatal Visit  [facility] [address] [place] [address]    Assessment/Plan:  Wayne Arreaga is a 28y o  year old  at 19w9d who presents for routine prenatal visit  1  Supervision of high risk pregnancy in second trimester    2  24 weeks gestation of pregnancy    3  Chronic hypertension affecting pregnancy  Assessment & Plan:  - Based on her blood pressures at GYN annuals in  and , it appears she has underlying chronic hypertension   - We reviewed her baseline labs this pregnancy  She has already started aspirin   - Current medications: None, but if she persistently has elevated Bps above 140/90 we discussed starting labetalol in pregnancy per updated guidelines  - Reviewed signs and symptoms of superimposed pre-eclampsia  -  surveillance: Twice weekly NSTs starting at 32 weeks; Growth ultrasound every 4 weeks after 28 weeks;   - Delivery timing: Delivery timing between [id number] depending on blood pressure control  Orders:  -     CBC and Platelet; Future  -     Protein / creatinine ratio, urine; Future  -     Comprehensive metabolic panel; Future    4  Third trimester pregnancy  -     Glucose CAMILO 3HR 100GM PREG PTS; Future  -     Anemia Panel w/Reflex, OB; Future    5  Low-lying placenta without hemorrhage, second trimester    6  Marginal insertion of umbilical cord affecting management of mother in second trimester    7  Maternal morbid obesity in second trimester, antepartum ([facility] [address] )        Subjective:     CC: Prenatal care    Juan Bal is a 28 y o  Lefty Marko female who presents for routine prenatal care at 24w5d  Pregnancy ROS: Denies leakage of fluid, pelvic pain, or vaginal bleeding  Reports fetal movement      The following portions of the patient's history were reviewed and updated as appropriate: allergies, current medications, past family history, past medical history, obstetric history, gynecologic history, past social history, past surgical history and problem list       Objective:  /80   Wt 126 kg (278 lb)   LMP 2022   BMI 43 54 kg/m²   Pregravid Weight/BMI: 123 kg (272 lb) (BMI 42 59)  Current Weight: 126 kg (278 lb)   Total Weight Gain: 2 722 kg (6 lb)   Pre- Vitals    Flowsheet Row Most Recent Value   Prenatal Assessment    Fetal Heart Rate 143   Movement Present   Prenatal Vitals    Blood Pressure 140/80   Weight - Scale 126 kg (278 lb)   Urine Albumin/Glucose    Dilation/Effacement/Station    Vaginal Drainage    Edema    LLE Edema None   RLE Edema None           General: Well appearing, no distress  Respiratory: Unlabored breathing  Cardiovascular: Regular rate  Abdomen: Soft, gravid, nontender  Fundal Height: Appropriate for gestational age  Extremities: Warm and well perfused  Non tender      Caterina Treviño MD  [facility]  2022 1:10 PM

## 2022-07-19 ENCOUNTER — APPOINTMENT (OUTPATIENT)
Dept: LAB | Facility: HOSPITAL | Age: 32
End: 2022-07-19
Payer: COMMERCIAL

## 2022-07-19 DIAGNOSIS — O10.919 CHRONIC HYPERTENSION AFFECTING PREGNANCY: ICD-10-CM

## 2022-07-19 DIAGNOSIS — Z34.93 THIRD TRIMESTER PREGNANCY: ICD-10-CM

## 2022-07-19 LAB
ALBUMIN SERPL BCP-MCNC: 2.9 G/DL (ref 3.5–5)
ALP SERPL-CCNC: 103 U/L (ref 46–116)
ALT SERPL W P-5'-P-CCNC: 23 U/L (ref 12–78)
ANION GAP SERPL CALCULATED.3IONS-SCNC: 13 MMOL/L (ref 4–13)
AST SERPL W P-5'-P-CCNC: 20 U/L (ref 5–45)
BILIRUB SERPL-MCNC: 0.2 MG/DL (ref 0.2–1)
BUN SERPL-MCNC: 6 MG/DL (ref 5–25)
CALCIUM ALBUM COR SERPL-MCNC: 10.1 MG/DL (ref 8.3–10.1)
CALCIUM SERPL-MCNC: 9.2 MG/DL (ref 8.3–10.1)
CHLORIDE SERPL-SCNC: 100 MMOL/L (ref 96–108)
CO2 SERPL-SCNC: 24 MMOL/L (ref 21–32)
CREAT SERPL-MCNC: 0.62 MG/DL (ref 0.6–1.3)
CREAT UR-MCNC: <13 MG/DL
ERYTHROCYTE [DISTWIDTH] IN BLOOD BY AUTOMATED COUNT: 13.4 % (ref 11.6–15.1)
GFR SERPL CREATININE-BSD FRML MDRD: 120 ML/MIN/1.73SQ M
GLUCOSE 1H P 100 G GLC PO SERPL-MCNC: 206 MG/DL (ref 65–179)
GLUCOSE 2H P 100 G GLC PO SERPL-MCNC: 184 MG/DL (ref 65–154)
GLUCOSE 3H P 100 G GLC PO SERPL-MCNC: 104 MG/DL (ref 65–139)
GLUCOSE P FAST SERPL-MCNC: 88 MG/DL (ref 65–99)
GLUCOSE P FAST SERPL-MCNC: 89 MG/DL (ref 65–99)
HCT VFR BLD AUTO: 36 % (ref 34.8–46.1)
HGB BLD-MCNC: 11.8 G/DL (ref 11.5–15.4)
MCH RBC QN AUTO: 29.8 PG (ref 26.8–34.3)
MCHC RBC AUTO-ENTMCNC: 32.8 G/DL (ref 31.4–37.4)
MCV RBC AUTO: 91 FL (ref 82–98)
PLATELET # BLD AUTO: 306 THOUSANDS/UL (ref 149–390)
PMV BLD AUTO: 9 FL (ref 8.9–12.7)
POTASSIUM SERPL-SCNC: 3.7 MMOL/L (ref 3.5–5.3)
PROT SERPL-MCNC: 8.3 G/DL (ref 6.4–8.4)
PROT UR-MCNC: <6 MG/DL
RBC # BLD AUTO: 3.96 MILLION/UL (ref 3.81–5.12)
SODIUM SERPL-SCNC: 137 MMOL/L (ref 135–147)
WBC # BLD AUTO: 15.78 THOUSAND/UL (ref 4.31–10.16)

## 2022-07-19 PROCEDURE — 36415 COLL VENOUS BLD VENIPUNCTURE: CPT

## 2022-07-19 PROCEDURE — 82570 ASSAY OF URINE CREATININE: CPT

## 2022-07-19 PROCEDURE — 84156 ASSAY OF PROTEIN URINE: CPT

## 2022-07-19 PROCEDURE — 80053 COMPREHEN METABOLIC PANEL: CPT

## 2022-07-19 PROCEDURE — 82952 GTT-ADDED SAMPLES: CPT

## 2022-07-19 PROCEDURE — 85027 COMPLETE CBC AUTOMATED: CPT

## 2022-07-19 PROCEDURE — 82951 GLUCOSE TOLERANCE TEST (GTT): CPT

## 2022-07-20 DIAGNOSIS — R73.9 BLOOD GLUCOSE ELEVATED: Primary | ICD-10-CM

## 2022-07-20 DIAGNOSIS — Z3A.28 28 WEEKS GESTATION OF PREGNANCY: ICD-10-CM

## 2022-07-21 ENCOUNTER — TELEPHONE (OUTPATIENT)
Dept: PERINATAL CARE | Facility: OTHER | Age: 32
End: 2022-07-21

## 2022-07-21 NOTE — TELEPHONE ENCOUNTER
Left message for pt regarding her appt time change on 8/15 in [place] to 9:45, I did advise pt to call me with any questions [phone]

## 2022-07-25 ENCOUNTER — ROUTINE PRENATAL (OUTPATIENT)
Dept: OBGYN CLINIC | Facility: MEDICAL CENTER | Age: 32
End: 2022-07-25
Payer: COMMERCIAL

## 2022-07-25 VITALS
HEIGHT: 67 IN | DIASTOLIC BLOOD PRESSURE: 90 MMHG | SYSTOLIC BLOOD PRESSURE: 140 MMHG | WEIGHT: 280 LBS | BODY MASS INDEX: 43.95 KG/M2

## 2022-07-25 DIAGNOSIS — Z3A.28 28 WEEKS GESTATION OF PREGNANCY: ICD-10-CM

## 2022-07-25 DIAGNOSIS — O24.419 GESTATIONAL DIABETES MELLITUS (GDM) IN THIRD TRIMESTER, GESTATIONAL DIABETES METHOD OF CONTROL UNSPECIFIED: ICD-10-CM

## 2022-07-25 DIAGNOSIS — O44.43 LOW LYING PLACENTA NOS OR WITHOUT HEMORRHAGE, THIRD TRIMESTER: ICD-10-CM

## 2022-07-25 DIAGNOSIS — O99.213 OBESITY AFFECTING PREGNANCY IN THIRD TRIMESTER: ICD-10-CM

## 2022-07-25 DIAGNOSIS — O10.919 CHRONIC HYPERTENSION AFFECTING PREGNANCY: Primary | ICD-10-CM

## 2022-07-25 DIAGNOSIS — O43.193 MARGINAL INSERTION OF UMBILICAL CORD AFFECTING MANAGEMENT OF MOTHER IN THIRD TRIMESTER: ICD-10-CM

## 2022-07-25 PROCEDURE — 90471 IMMUNIZATION ADMIN: CPT | Performed by: NURSE PRACTITIONER

## 2022-07-25 PROCEDURE — 90715 TDAP VACCINE 7 YRS/> IM: CPT | Performed by: NURSE PRACTITIONER

## 2022-07-25 PROCEDURE — PNV: Performed by: NURSE PRACTITIONER

## 2022-07-25 NOTE — PROGRESS NOTES
Denies loss of fluid, vaginal bleeding and abdominal pain  Confirms frequent fetal movement  Tolerating prenatal vitamin and low-dose aspirin well  Reviewed 28 week labs significant for abnormal 3 hour glucose  Preeclamptic labs-WNL  Patient has appointment scheduled for Friday with diabetes in pregnancy program   Reviewed recommendation for Tdap vaccine, patient is agreeable to vaccine today  Denies other questions or concerns  Patient plans include opened epidural as needed for pain control, breast feeding (breast pump ordered via parachute today), circumcision for infant and OCPs for postpartum contraception  BP: 140/90  Weight: +8lb  Plan  -continue prenatal vitamin and low-dose aspirin daily  -fetal kick counts reviewed, encouraged daily and written information provided  -cHTN signs and symptoms to report reviewed  -Tdap vaccine today  -follow-up  Center 8/15/22 growth scan (low-lying placenta/PCI)  -28 week folder provided and reviewed  Breast pump ordered through parachute  -common discomforts of pregnancy and precautions including  labor reviewed  Signs and symptoms report reviewed    Written information provided about COVID 19  RTO 2 weeks

## 2022-07-28 ENCOUNTER — DOCUMENTATION (OUTPATIENT)
Dept: PERINATAL CARE | Facility: CLINIC | Age: 32
End: 2022-07-28

## 2022-07-29 ENCOUNTER — TELEMEDICINE (OUTPATIENT)
Dept: PERINATAL CARE | Facility: CLINIC | Age: 32
End: 2022-07-29
Payer: COMMERCIAL

## 2022-07-29 DIAGNOSIS — O24.419 GESTATIONAL DIABETES MELLITUS (GDM) IN THIRD TRIMESTER, GESTATIONAL DIABETES METHOD OF CONTROL UNSPECIFIED: Primary | ICD-10-CM

## 2022-07-29 DIAGNOSIS — Z3A.29 29 WEEKS GESTATION OF PREGNANCY: ICD-10-CM

## 2022-07-29 DIAGNOSIS — O99.213 OBESITY AFFECTING PREGNANCY IN THIRD TRIMESTER: ICD-10-CM

## 2022-07-29 DIAGNOSIS — R73.9 BLOOD GLUCOSE ELEVATED: ICD-10-CM

## 2022-07-29 PROCEDURE — G0108 DIAB MANAGE TRN  PER INDIV: HCPCS

## 2022-07-29 RX ORDER — BLOOD-GLUCOSE METER
EACH MISCELLANEOUS
Qty: 1 KIT | Refills: 0 | Status: SHIPPED | OUTPATIENT
Start: 2022-07-29

## 2022-07-29 RX ORDER — LANCETS
EACH MISCELLANEOUS
Qty: 100 EACH | Refills: 4 | Status: SHIPPED | OUTPATIENT
Start: 2022-07-29

## 2022-07-29 RX ORDER — PERPHENAZINE 16 MG/1
TABLET, FILM COATED ORAL
Qty: 100 STRIP | Refills: 4 | Status: SHIPPED | OUTPATIENT
Start: 2022-07-29

## 2022-07-29 NOTE — PROGRESS NOTES
Virtual Regular Visit    Verification of patient location:    Patient is located in the following state in which I hold an active license [place]      Assessment/Plan:    Problem List Items Addressed This Visit        Endocrine    Gestational diabetes mellitus (GDM) in third trimester - Primary       Other    Obesity affecting pregnancy in third trimester    28 weeks gestation of pregnancy      Other Visit Diagnoses     Blood glucose elevated                   Reason for visit is   Chief Complaint   Patient presents with    Gestational Diabetes    Patient Education    Virtual Regular Visit        Encounter provider Yeimi Kay    Provider located at [address] [place] [id number] [phone]      Recent Visits  No visits were found meeting these conditions  Showing recent visits within past 7 days and meeting all other requirements  Today's Visits  Date Type Provider Dept   07/29/22 1401 [address]   Showing today's visits and meeting all other requirements  Future Appointments  No visits were found meeting these conditions  Showing future appointments within next 150 days and meeting all other requirements       The patient was identified by name and date of birth  Brenda Gaytan was informed that this is a telemedicine visit and that the visit is being conducted through [address] [place] [address] Now and patient was informed that this is a secure, HIPAA-compliant platform  She agrees to proceed     My office door was closed  The patient was notified she was part of a group class 1 virtual appointment  She acknowledged consent and understanding of privacy and security of the video platform  The patient has agreed to participate and understands they can discontinue the visit at any time  Patient is aware this is a billable service  Norm Fabian is a 33 y  o pregnant female         HPI     Past Medical History:   Diagnosis Date    Chronic hypertension affecting pregnancy 6/27/2022    Gallstones     Gestational diabetes mellitus (GDM) in third trimester 7/25/2022    History of COVID-19 March 2021-no hospitalization    Varicella     vaccine    Wears glasses        Past Surgical History:   Procedure Laterality Date    CHOLECYSTECTOMY      KNEE SURGERY Right     [place] LAP,CHOLECYSTECTOMY N/A 12/15/2021    Procedure: CHOLECYSTECTOMY LAPAROSCOPIC W/ ROBOTICS;  Surgeon: Sagrario Vinson MD;  Location: [facility];  Service: General    WISDOM TOOTH EXTRACTION         Current Outpatient Medications   Medication Sig Dispense Refill    ASPIRIN ADULT LOW DOSE PO       Prenatal MV & Min w/FA-DHA (ONE A DAY PRENATAL PO)        No current facility-administered medications for this visit  No Known Allergies    Review of Systems    Video Exam    There were no vitals filed for this visit  Physical Exam     I spent 60 minutes with patient today in which greater than 50% of the time was spent in counseling/coordination of care regarding gestational diabetes  VIRTUAL VISIT DISCLAIMER      Zee Lynn verbally agrees to participate in [employer]  Pt is aware that [employer] could be limited without vital signs or the ability to perform a full hands-on physical exam  Zee Lynn understands she or the provider may request at any time to terminate the video visit and request the patient to seek care or treatment in person  Thank you for referring your patient to [facility] Maternal Fetal Medicine Diabetes in Pregnancy Program      Montana Mcgovern is a  33 y o  female who presents today for Class 1  Patient is at 29w2d gestation, Estimated Date of Delivery: 10/12/22  Reviewed and updated the following from patients medical record: PMH, Problem List, Allergies, and Current Medications      Visit Diagnosis:  GDM in pregnancy method of control unspecified    Discussed with patient pathophysiology of GDM, untreated hyperglycemia in pregnancy and maternal fetal complications including fetal macrosomia,  hypoglycemia, polyhydramnios, increased incidence of  section,  labor, and in severe cases fetal demise and still birth   Discussed importance of blood glucose monitoring, nutrition, and medication if necessary in achieving BG goals  Additional Pregnancy Complications:  Obesity    Labs:    Lab Results   Component Value Date    [id number] 139 (H) 2022       Lab Results   Component Value Date    GLUF 88 2022    GLUF 89 2022    [id number] 206 (H) 2022    [id number] 184 (H) 2022    [id number] 104 2022        No components found for: HGA1C    Medications:  No diabetes related medications    Anthropometrics:  Ht Readings from Last 3 Encounters:   22 5' 7" (1 702 m)   22 5' 7" (1 702 m)   22 5' 7" (1 702 m)     Wt Readings from Last 3 Encounters:   22 127 kg (280 lb)   22 126 kg (278 lb)   22 126 kg (277 lb 6 4 oz)     Pre-gravid weight: 123 kg (272 lb)  Pre-gravid BMI: 42 59  Weight Change: 3 629 kg (8 lb)  Weight gain recommendations: BMI (> 30) 11-20 lbs  Comments: Patient may gain up to 12 pounds for duration of her pregnancy  Recent Ultra Sound Results:  Date: 22  Fetal Growth: Normal  SAMY: Normal  Next  date: 8/15/22    Blood Glucose Monitoring:   Glucose Meter: Contour Next   Instructed on testing blood sugars: 4 x per day (Fasting, 2 hour after start of each meal)    Gave instruction on site selection, skin preparation, loading strips and lancet device, meter activation, obtaining blood sample, test strip and lancet disposal and storage, and recording log book entries  Patient has good understanding of material covered and was able to test their own blood sugar in office today       Instruction for reporting blood sugar results weekly via:  Phone: [phone]   OR  My Chart (Message with image attachment, or Glucose Flowsheet)    Goal Blood Sugar Ranges:   Fastin-90 mg/dL  1 hour after the start of each meal: 140 mg/dL or less  2 hours after start of each meal: 120 mg/dL or less    Meal Plan (daily calorie and protein needs):  Calories: 2400 calorie (CHO: 01-66-98-30-60-30) (PRO: 3-2-3/4-2-3/4-2)    Type of Diet:Regular  Additional Nutrition Concerns: Patient works as a RN for [employer]  She is 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 7 am  Specifics were not discussed due to group class  Information was obtained via D&P self assessment form completed and documented  Meal Plan Tips:  1  Patient was provided with a meal plan including 3 meals and 3 snacks  2  Discussed appropriate amounts of CHO, PRO, and Fat at each meal and snack  3  Reviewed CHO exchange list, and portion sizes for both CHO and PRO via food models  4  Instruction on how to read a food label  5  Provided suggested meal/snack options to increase nutrition and maintain consistent meal and snack intakes  6  Instructed on how to keep a 3-day food diary to be brought to follow- up appointment  7  Encouraged  patient to eat every 2 0-3 5 hours while awake  8  Encouraged patient to go no longer than 8-10 hours fasting overnight until first meal of the day  Physical Activity:  Discussed benefits of physical activity to optimize blood glucose control, encouraged activity at patient is physically able  Always consult a physician prior to starting an exercise program  Recommend 20-30 minutes daily      Patient Stated Goal: "I will eat 3 meals and 3 snacks each day, including protein at each"    Diabetes Self Management Support Plan outside of ongoing care: Spouse/Family    Learner/s Present:Learners Present: Patient   Barriers to Learning/Change: No Barriers  Expected Compliance: good    Date to report blood sugars: Requested patient report blood sugars weekly via [employer] glucose flowsheet  Class 2 (date): 8/4/22    Begin Time: 9:30 am  End Time: 10:30 am    It was a pleasure working with them today  Please feel free to call with any questions or concerns      Angeline Becerra, RD,LDN,CDE  Diabetes Educator  [facility]  Diabetes in Pregnancy Program  [address] [place] [address],[address]  [address]

## 2022-08-04 ENCOUNTER — TELEMEDICINE (OUTPATIENT)
Dept: PERINATAL CARE | Facility: CLINIC | Age: 32
End: 2022-08-04
Payer: COMMERCIAL

## 2022-08-04 DIAGNOSIS — Z3A.30 30 WEEKS GESTATION OF PREGNANCY: ICD-10-CM

## 2022-08-04 DIAGNOSIS — O99.213 OBESITY AFFECTING PREGNANCY IN THIRD TRIMESTER: ICD-10-CM

## 2022-08-04 DIAGNOSIS — O24.419 GESTATIONAL DIABETES MELLITUS (GDM) IN THIRD TRIMESTER, GESTATIONAL DIABETES METHOD OF CONTROL UNSPECIFIED: Primary | ICD-10-CM

## 2022-08-04 PROCEDURE — G0108 DIAB MANAGE TRN  PER INDIV: HCPCS

## 2022-08-04 NOTE — PROGRESS NOTES
Virtual Regular Visit    Verification of patient location:    Patient is located in the following state in which I hold an active license [place]      Assessment/Plan:    Problem List Items Addressed This Visit        Endocrine    Gestational diabetes mellitus (GDM) in third trimester - Primary       Other    Obesity affecting pregnancy in third trimester    28 weeks gestation of pregnancy               Reason for visit is   Chief Complaint   Patient presents with    Gestational Diabetes    Patient Education    Virtual Regular Visit        Encounter provider Carmenza Simon    Provider located at [address] [place] [id number] [phone]      Recent Visits  Date Type Provider Dept   07/29/22 [address] [place] [address] Street   Showing recent visits within past 7 days and meeting all other requirements  Today's Visits  Date Type Provider Dept   08/04/22 [address] [place] [address]   Showing today's visits and meeting all other requirements  Future Appointments  No visits were found meeting these conditions  Showing future appointments within next 150 days and meeting all other requirements       The patient was identified by name and date of birth  Di Ding was informed that this is a telemedicine visit and that the visit is being conducted through [facility] and patient was informed this is a secure, HIPAA-complaint platform  She agrees to proceed     My office door was closed  No one else was in the room  She acknowledged consent and understanding of privacy and security of the video platform  The patient has agreed to participate and understands they can discontinue the visit at any time  Patient is aware this is a billable service  Subjective  Zee Foster is a 33 y  o pregnant female         HPI     Past Medical History:   Diagnosis Date    Chronic hypertension affecting pregnancy 6/27/2022    Gallstones     Gestational diabetes mellitus (GDM) in third trimester 7/25/2022    History of COVID-19     March 2021-no hospitalization    Varicella     vaccine    Wears glasses        Past Surgical History:   Procedure Laterality Date    CHOLECYSTECTOMY      KNEE SURGERY Right     [place] LAP,CHOLECYSTECTOMY N/A 12/15/2021    Procedure: CHOLECYSTECTOMY LAPAROSCOPIC W/ ROBOTICS;  Surgeon: Jeffery Posada MD;  Location: [facility];  Service: General    WISDOM TOOTH EXTRACTION         Current Outpatient Medications   Medication Sig Dispense Refill    ASPIRIN ADULT LOW DOSE PO       Blood Glucose Monitoring Suppl (Contour Next EZ) w/Device KIT Dispense 1 kit per insurance formulary  Gestational diabetes  1 kit 0    Contour Next Test test strip Test 4 times per day or as directed  Gestational diabetes  100 strip 4    Microlet Lancets MISC Use 4 a day or as directed  Gestational diabetes  100 each 4    Prenatal MV & Min w/FA-DHA (ONE A DAY PRENATAL PO)        No current facility-administered medications for this visit  No Known Allergies    Review of Systems    Video Exam    There were no vitals filed for this visit  Physical Exam     I spent 43 minutes with patient today in which greater than 50% of the time was spent in counseling/coordination of care regarding gestational diabetes  VIRTUAL VISIT DISCLAIMER      Zee Coronel verbally agrees to participate in [employer]  Pt is aware that [employer] could be limited without vital signs or the ability to perform a full hands-on physical exam  Zee Coronel understands she or the provider may request at any time to terminate the video visit and request the patient to seek care or treatment in person  Thank you for referring your patient to [facility] Maternal Fetal Medicine Diabetes in Pregnancy Program      Shamir Rainey is a  33 y o  female who presents today for Class 2    Patient is at 30w1d gestation, Estimated Date of Delivery: 10/12/22  Reviewed and updated the following from patients medical record: PMH, Problem List, Allergies, and Current Medications  Visit Diagnosis:  GDM in pregnancy method of control unspecified    Additional Pregnancy Complications:  Obesity    Labs:    Lab Results   Component Value Date    [id number] 139 (H) 2022       Lab Results   Component Value Date    GLUF 88 2022    GLUF 89 2022    NTLHLGI0RL 206 (H) 2022    YLGACBJ2HN 184 (H) 2022    QJYJVTS9AC 104 2022        No components found for: HGA1C    Medications:  No diabetes related medications    Anthropometrics:  Ht Readings from Last 3 Encounters:   22 5' 7" (1 702 m)   22 5' 7" (1 702 m)   22 5' 7" (1 702 m)     Wt Readings from Last 3 Encounters:   22 127 kg (280 lb)   22 126 kg (278 lb)   22 126 kg (277 lb 6 4 oz)     Pre-gravid weight: 123 kg (272 lb)  Pre-gravid BMI: 42 59  Weight Change: 3 629 kg (8 lb)  Weight gain recommendations: BMI (> 30) 11-20 lbs  Comments: patient may gain up to 12 pounds for duration of her pregnancy    Recent Ultra Sound Results:  Date: 22  Fetal Growth: Normal  SAMY: Normal  Next US date: 8/15/22  Blood Glucose Monitoring:  Reinforcement of blood glucose goals and reporting guidelines  Glucose Meter: Contour Next EZ  Testing blood sugars: 4 x per day (Fasting, 2 hour after start of each meal)  Method of reporting blood sugars:Glucose Flowsheet    Report blood sugar results weekly via:  Phone: [phone]   OR  My Chart (Message with image attachment, or Glucose Flowsheet)    Goal Blood Sugar Ranges:   Fastin-90 mg/dL  1 hour after the start of each meal: 140 mg/dL or less  2 hours after start of each meal: 120 mg/dL or less      BG Log:  Review of blood glucose log          Meal Plan:  Calories: 2400 calorie (CHO: 00-22-80-30-60-30) (PRO: 3-2-3/4-2-3/4-2)    Diet Type: Regular  Additional Nutrition concerns: 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  Specifics were not discussed due to group class  Diet review: Patient follows the meal plan very well  She is managing 3 meals and 3 snacks daily including a combination of carbohydrate paired with protein  She is able to transition her schedule from working nights 3 days per week to non-working days following guidelines of fasting at least 8 hrs to no more than 10 hrs overnight; eating every 2 to 3 5 hrs when awake  Diet Instruction:  The patient was instructed on the followin  Individualized meal plan  2  Importance of consistent carbohydrate intake via 3 meals and 3 snacks per day   3  Importance of protein as it relates to blood glucose control  4  Encouraged  patient to eat every 2 0-3 5 hours while awake  5  Encouraged patient to go no longer than 8-10 hours fasting overnight until first meal of the day  6  Provided suggested meal/snack options to increase nutrition and maintain consistent meal and snack intakes  Physical Activity:  Discussed benefits of physical activity to optimize blood glucose control, encouraged activity at patient is physically able  Always consult a physician prior to starting an exercise program  Recommend 20-30 minutes daily  Is patient physically active? active job as a RN and is trying to walk daily on days off    Sick day Guidelines:   Patient advised that sickness will raise blood sugar and need to continue medication regimen as directed  If blood sugar is > 160 mg/dL twice in one day call doctor  Instructed on what to do when unable to consume normal meal plan  Hypoglycemia & Treatment Guidelines:  Reviewed what hypoglycemia is, signs and symptoms, and how to treat via the 15:15 rule      Post-Partum Guidelines:  Completion of 75 gm CHO 2 hr gtt at 6 weeks post-partum to check for Type 2 DM diagnosis    Breastfeeding Guidelines:  Continue GDM meal plan plus additional 350-500 calories daily  Stay hydrated by drinking 8-10 (8 oz ) fluids daily  Examples of protein and carbohydrate snacks provided  Dining Out & Travel Guidelines:  Patient advised to be prepared with extra diabetes supplies, medications, and snacks, as well as sticking to the same time schedule and portions eaten at home for meals and snacks  Maternal-Fetal Testing:    Ultrasounds- growth scans every 4 weeks  NST- twice weekly starting at 32nd week GA   SAMY- weekly starting at 32 weeks GA    Patient Stated Goal: "I will eat 3 meals and 3 snacks each day, including protein at each"  Goal Assessment: On track    Diabetes Self Management Support Plan outside of ongoing care: Spouse/Family    Learner/s Present:Learners Present: Patient   Barriers to Learning/Change: No Barriers  Expected Compliance: very good    Date to report blood sugars: Requested patient report blood sugars weekly every Tuesday via flow sheet  Follow up date: no follow up scheduled today    Begin Time: 1:00 PM   End Time: 1:43 PM    It was a pleasure working with them today  Please feel free to call with any questions or concerns      Mary Ann Li RD,LDN,CDE  Diabetes Educator  [facility]  Diabetes in Pregnancy Program  [address]  [address]

## 2022-08-08 ENCOUNTER — ROUTINE PRENATAL (OUTPATIENT)
Dept: OBGYN CLINIC | Facility: MEDICAL CENTER | Age: 32
End: 2022-08-08

## 2022-08-08 VITALS — BODY MASS INDEX: 44.01 KG/M2 | SYSTOLIC BLOOD PRESSURE: 142 MMHG | WEIGHT: 281 LBS | DIASTOLIC BLOOD PRESSURE: 92 MMHG

## 2022-08-08 DIAGNOSIS — Z3A.30 30 WEEKS GESTATION OF PREGNANCY: ICD-10-CM

## 2022-08-08 DIAGNOSIS — O99.213 MATERNAL MORBID OBESITY IN THIRD TRIMESTER, ANTEPARTUM (HCC): ICD-10-CM

## 2022-08-08 DIAGNOSIS — O43.193 MARGINAL INSERTION OF UMBILICAL CORD AFFECTING MANAGEMENT OF MOTHER IN THIRD TRIMESTER: ICD-10-CM

## 2022-08-08 DIAGNOSIS — O24.410 DIET CONTROLLED GESTATIONAL DIABETES MELLITUS (GDM) IN THIRD TRIMESTER: ICD-10-CM

## 2022-08-08 DIAGNOSIS — Z34.93 THIRD TRIMESTER PREGNANCY: Primary | ICD-10-CM

## 2022-08-08 DIAGNOSIS — O10.919 CHRONIC HYPERTENSION AFFECTING PREGNANCY: ICD-10-CM

## 2022-08-08 DIAGNOSIS — O44.43 LOW LYING PLACENTA NOS OR WITHOUT HEMORRHAGE, THIRD TRIMESTER: ICD-10-CM

## 2022-08-08 DIAGNOSIS — E66.01 MATERNAL MORBID OBESITY IN THIRD TRIMESTER, ANTEPARTUM (HCC): ICD-10-CM

## 2022-08-08 PROCEDURE — PNV: Performed by: OBSTETRICS & GYNECOLOGY

## 2022-08-08 NOTE — PROGRESS NOTES
Assessment  33 y o  Jimmy Gamboa at 30w5d presenting for routine prenatal visit  Plan  Diagnoses and all orders for this visit:    Third trimester pregnancy  30 weeks gestation of pregnancy  -  labor precautions  - FKC  - Return in 2wk for PN    Low lying placenta nos or without hemorrhage, third trimester  Marginal insertion of umbilical cord affecting management of mother in third trimester  - Follow with MFM  - Follow up [address] next week    Chronic hypertension affecting pregnancy  - Discussed BP levels   - Continue close monitoring  Home levels persistently less than office  If persistently >140/90, should consider medical management     Diet controlled gestational diabetes mellitus (GDM) in third trimester  Maternal morbid obesity in third trimester, antepartum ([address] )  - Follows with MFM  - Continue dietary changes  - Continue glucose monitoring and reporting      ____________________________________________________________        Subjective    Zee Deal is a 33 y o  Dapteagan Gamboa at 30w5d who presents for routine prenatal visit  She is without complaint  She denies contractions, loss of fluid, or vaginal bleeding  She feels regular fetal movements  Discussed BPs  Last few visits 140-142/80-92  Reports monitors daily at home, largely high 120s/80s  Discussed indications for medical management  Denies headaches, visual changes, or RUQ pain       Pregnancy Problems:  Patient Active Problem List   Diagnosis    History of COVID-19    Obesity affecting pregnancy in third trimester    COVID-19 vaccine series completed    Maternal morbid obesity in second trimester, antepartum ([address] )    Low lying placenta nos or without hemorrhage, third trimester    Marginal insertion of umbilical cord affecting management of mother in third trimester    28 weeks gestation of pregnancy    Chronic hypertension affecting pregnancy    Gestational diabetes mellitus (GDM) in third trimester         Objective  /92   Wt 127 kg (281 lb)   LMP 01/05/2022   BMI 44 01 kg/m²     FHT: 141 BPM   Uterine Size: 31 cm     Physical Exam:  Physical Exam  Constitutional:       General: She is not in acute distress  Appearance: Normal appearance  She is well-developed  She is not ill-appearing, toxic-appearing or diaphoretic  HENT:      Head: Normocephalic and atraumatic  Eyes:      General: No scleral icterus  Right eye: No discharge  Left eye: No discharge  Conjunctiva/sclera: Conjunctivae normal    Pulmonary:      Effort: Pulmonary effort is normal  No accessory muscle usage or respiratory distress  Abdominal:      General: There is distension (gravid)  Tenderness: There is no abdominal tenderness  There is no guarding or rebound  Skin:     General: Skin is warm and dry  Coloration: Skin is not jaundiced  Findings: No bruising, erythema or rash  Neurological:      Mental Status: She is alert  Psychiatric:         Mood and Affect: Mood normal          Behavior: Behavior normal          Thought Content:  Thought content normal          Judgment: Judgment normal

## 2022-08-14 NOTE — PROGRESS NOTES
Please refer to the [facility] ultrasound report in Ob Procedures for additional information regarding today's visit

## 2022-08-15 ENCOUNTER — ULTRASOUND (OUTPATIENT)
Dept: PERINATAL CARE | Facility: OTHER | Age: 32
End: 2022-08-15
Payer: COMMERCIAL

## 2022-08-15 VITALS
WEIGHT: 280 LBS | SYSTOLIC BLOOD PRESSURE: 130 MMHG | BODY MASS INDEX: 43.95 KG/M2 | HEIGHT: 67 IN | HEART RATE: 112 BPM | DIASTOLIC BLOOD PRESSURE: 94 MMHG

## 2022-08-15 DIAGNOSIS — O99.213 MATERNAL MORBID OBESITY IN THIRD TRIMESTER, ANTEPARTUM (HCC): ICD-10-CM

## 2022-08-15 DIAGNOSIS — Z3A.31 31 WEEKS GESTATION OF PREGNANCY: ICD-10-CM

## 2022-08-15 DIAGNOSIS — O24.410 DIET CONTROLLED GESTATIONAL DIABETES MELLITUS (GDM) IN THIRD TRIMESTER: Primary | ICD-10-CM

## 2022-08-15 DIAGNOSIS — O44.42 LOW-LYING PLACENTA WITHOUT HEMORRHAGE, SECOND TRIMESTER: ICD-10-CM

## 2022-08-15 DIAGNOSIS — O43.193 MARGINAL INSERTION OF UMBILICAL CORD AFFECTING MANAGEMENT OF MOTHER IN THIRD TRIMESTER: ICD-10-CM

## 2022-08-15 DIAGNOSIS — E66.01 MATERNAL MORBID OBESITY IN THIRD TRIMESTER, ANTEPARTUM (HCC): ICD-10-CM

## 2022-08-15 PROCEDURE — 99214 OFFICE O/P EST MOD 30 MIN: CPT | Performed by: OBSTETRICS & GYNECOLOGY

## 2022-08-15 PROCEDURE — 76817 TRANSVAGINAL US OBSTETRIC: CPT | Performed by: OBSTETRICS & GYNECOLOGY

## 2022-08-15 PROCEDURE — 76816 OB US FOLLOW-UP PER FETUS: CPT | Performed by: OBSTETRICS & GYNECOLOGY

## 2022-08-15 NOTE — LETTER
August 15, 2022     [email or web address]  [address] [facility]    Patient: Racheal Kidd   YOB: 1990   Date of Visit: 8/15/2022       Dear Dr Irasema Low: Thank you for referring Tanya Germain to me for evaluation  Below are my notes for this consultation  If you have questions, please do not hesitate to call me  I look forward to following your patient along with you  Sincerely,        Renu Haile MD        CC: No Recipients  Renu Haile MD  8/14/2022  7:49 PM  Sign when Signing Visit  Please refer to the [facility] ultrasound report in Ob Procedures for additional information regarding today's visit

## 2022-08-15 NOTE — PROGRESS NOTES
Ultrasound Probe Disinfection    A transvaginal ultrasound was performed  Prior to use, disinfection was performed with High Level Disinfection Process (Trophon)  Probe serial number F3: [id number] was used        Wendy Gonzalez  08/15/22  10:34 AM

## 2022-08-15 NOTE — PATIENT INSTRUCTIONS
Kick Counts in Pregnancy   WHAT YOU NEED TO KNOW:   Kick counts measure how much your baby is moving in your womb  A kick from your baby can be felt as a twist, turn, swish, roll, or jab  It is common to feel your baby kicking at 26 to 28 weeks of pregnancy  You may feel your baby kick as early as 20 weeks of pregnancy  You may want to start counting at 28 weeks  DISCHARGE INSTRUCTIONS:   Contact your doctor immediately if:   You feel a change in the number of kicks or movements of your baby  You feel fewer than 10 kicks within 2 hours  You have questions or concerns about your baby's movements  Why measure kick counts:  Your baby's movement may provide information about your baby's health  He or she may move less, or not at all, if there are problems  Your baby may move less if he or she is not getting enough oxygen or nutrition from the placenta  Do not smoke while you are pregnant  Smoking decreases the amount of oxygen that gets to your baby  Talk to your healthcare provider if you need help to quit smoking  Tell your healthcare provider as soon as you feel a change in your baby's movements  When to measure kick counts:   Measure kick counts at the same time every day  Measure kick counts when your baby is awake and most active  Your baby may be most active in the evening  How to measure kick counts:  Check that your baby is awake before you measure kick counts  You can wake up your baby by lightly pushing on your belly, walking, or drinking something cold  Your healthcare provider may tell you different ways to measure kick counts  You may be told to do the following:  Use a chart or clock to keep track of the time you start and finish counting  Sit in a chair or lie on your left side  Place your hands on the largest part of your belly  Count until you reach 10 kicks  Write down how much time it takes to count 10 kicks  It may take 30 minutes to 2 hours to count 10 kicks  It should not take more than 2 hours to count 10 kicks  Follow up with your doctor as directed:  Write down your questions so you remember to ask them during your visits  © Copyright [employer] 2022 Information is for End User's use only and may not be sold, redistributed or otherwise used for commercial purposes  All illustrations and images included in CareNotes® are the copyrighted property of A D A M , Inc  or [facility] Emily Sood   The above information is an  only  It is not intended as medical advice for individual conditions or treatments  Talk to your doctor, nurse or pharmacist before following any medical regimen to see if it is safe and effective for you

## 2022-08-19 PROBLEM — Z3A.32 32 WEEKS GESTATION OF PREGNANCY: Status: ACTIVE | Noted: 2022-06-27

## 2022-08-22 ENCOUNTER — ROUTINE PRENATAL (OUTPATIENT)
Dept: OBGYN CLINIC | Facility: MEDICAL CENTER | Age: 32
End: 2022-08-22

## 2022-08-22 VITALS
DIASTOLIC BLOOD PRESSURE: 78 MMHG | SYSTOLIC BLOOD PRESSURE: 122 MMHG | HEIGHT: 67 IN | BODY MASS INDEX: 43.63 KG/M2 | WEIGHT: 278 LBS

## 2022-08-22 DIAGNOSIS — Z3A.32 32 WEEKS GESTATION OF PREGNANCY: ICD-10-CM

## 2022-08-22 DIAGNOSIS — E66.01 MATERNAL MORBID OBESITY IN THIRD TRIMESTER, ANTEPARTUM (HCC): ICD-10-CM

## 2022-08-22 DIAGNOSIS — O99.213 MATERNAL MORBID OBESITY IN THIRD TRIMESTER, ANTEPARTUM (HCC): ICD-10-CM

## 2022-08-22 DIAGNOSIS — O10.919 CHRONIC HYPERTENSION AFFECTING PREGNANCY: ICD-10-CM

## 2022-08-22 DIAGNOSIS — O24.410 DIET CONTROLLED GESTATIONAL DIABETES MELLITUS (GDM) IN THIRD TRIMESTER: Primary | ICD-10-CM

## 2022-08-22 PROBLEM — O44.43 LOW LYING PLACENTA NOS OR WITHOUT HEMORRHAGE, THIRD TRIMESTER: Status: RESOLVED | Noted: 2022-05-26 | Resolved: 2022-08-22

## 2022-08-22 PROBLEM — O43.193 MARGINAL INSERTION OF UMBILICAL CORD AFFECTING MANAGEMENT OF MOTHER IN THIRD TRIMESTER: Status: RESOLVED | Noted: 2022-05-26 | Resolved: 2022-08-22

## 2022-08-22 PROCEDURE — PNV: Performed by: NURSE PRACTITIONER

## 2022-08-22 NOTE — PROGRESS NOTES
Denies loss of fluid, vaginal bleeding and abdominal pain  Confirms frequent fetal movement  Doing fetal kick counts daily  Tolerating prenatal vitamin and low-dose aspirin well  Gestational diabetes fasting blood sugars are around 7 days and 2 hour post prandials less than 120  Denies questions or concerns at today's visit  [facility] ultrasound reviewed-8/15/22-vertex presentation, normal appearing fetal growth, anterior placenta, no placenta previa, SAMY-WNL and EFW 1735g/ 3lb 13oz (27%)  Recommendation for follow-up @ 34 gestational weeks for ANFS   BP: 122/78  Weight: +6lb  Plan  -continue prenatal vitamin and low-dose aspirin daily  -continue fetal kick counts daily  -GDM encouraged to continue following diet, close contact with diabetes in pregnancy program  -follow-up with  Center scheduled for 9/15/22  -common discomforts of pregnancy and precautions including  labor reviewed  Signs and symptoms report reviewed    Written information provided about COVID 19  RTO 2 weeks

## 2022-09-06 ENCOUNTER — ROUTINE PRENATAL (OUTPATIENT)
Dept: OBGYN CLINIC | Facility: MEDICAL CENTER | Age: 32
End: 2022-09-06

## 2022-09-06 VITALS — WEIGHT: 277 LBS | SYSTOLIC BLOOD PRESSURE: 126 MMHG | BODY MASS INDEX: 43.38 KG/M2 | DIASTOLIC BLOOD PRESSURE: 86 MMHG

## 2022-09-06 DIAGNOSIS — Z34.93 THIRD TRIMESTER PREGNANCY: Primary | ICD-10-CM

## 2022-09-06 DIAGNOSIS — O24.410 DIET CONTROLLED GESTATIONAL DIABETES MELLITUS (GDM) IN THIRD TRIMESTER: ICD-10-CM

## 2022-09-06 DIAGNOSIS — O99.213 MATERNAL MORBID OBESITY IN THIRD TRIMESTER, ANTEPARTUM (HCC): ICD-10-CM

## 2022-09-06 DIAGNOSIS — Z3A.34 34 WEEKS GESTATION OF PREGNANCY: ICD-10-CM

## 2022-09-06 DIAGNOSIS — O10.919 CHRONIC HYPERTENSION AFFECTING PREGNANCY: ICD-10-CM

## 2022-09-06 DIAGNOSIS — E66.01 MATERNAL MORBID OBESITY IN THIRD TRIMESTER, ANTEPARTUM (HCC): ICD-10-CM

## 2022-09-06 PROCEDURE — PNV: Performed by: OBSTETRICS & GYNECOLOGY

## 2022-09-06 NOTE — PROGRESS NOTES
Assessment  33 y o  Alyx Meli at 34w6d presenting for routine prenatal visit  Plan  Diagnoses and all orders for this visit:    Third trimester pregnancy  34 weeks gestation of pregnancy  -  labor precautions  - FKC  - Prior low lying placenta resolved!  - GBS next visit  - Return in 2wks for PN    Diet controlled gestational diabetes mellitus (GDM) in third trimester  Chronic hypertension affecting pregnancy  Maternal morbid obesity in third trimester, antepartum ([facility] [address] [facility] )  - Follows with MFM  - Both conditions controlled without medication currently  - Continue glucose checking and reporting  - Scheduled to start weekly antepartum surveillance with MFM  - Discussed delivery planning  Recommended window per [id number]       ____________________________________________________________        Subjective    Zee Herrera is a 33 y o  Alyx Meli at 34w6d who presents for routine prenatal visit  She is without complaint  She denies regular contractions, loss of fluid, or vaginal bleeding  She feels regular fetal movements  Delivery timing discussed  ACOG recommended delivery window for well controlled HTN without medication is [id number]  For A1GDM, 39wks is acceptable  Discussed good control for both but also multiple risk factors at play  Patient and partner to consider options/dates and will consider scheduling next visit         Pregnancy Problems:  Patient Active Problem List   Diagnosis    History of COVID-19    Obesity affecting pregnancy in third trimester    COVID-19 vaccine series completed    Maternal morbid obesity in third trimester, antepartum ([facility] [address] [facility] )    34 weeks gestation of pregnancy    Chronic hypertension affecting pregnancy    Gestational diabetes mellitus (GDM) in third trimester         Objective  /86   Wt 126 kg (277 lb)   LMP 2022   BMI 43 38 kg/m²     FHT: 129 BPM   Uterine Size: 35 cm     Physical Exam:  Physical Exam  Constitutional:       General: She is not in acute distress  Appearance: Normal appearance  She is well-developed  She is not ill-appearing, toxic-appearing or diaphoretic  HENT:      Head: Normocephalic and atraumatic  Eyes:      General: No scleral icterus  Right eye: No discharge  Left eye: No discharge  Conjunctiva/sclera: Conjunctivae normal    Pulmonary:      Effort: Pulmonary effort is normal  No accessory muscle usage or respiratory distress  Abdominal:      General: There is distension (gravid)  Tenderness: There is no abdominal tenderness  There is no guarding or rebound  Skin:     General: Skin is warm and dry  Coloration: Skin is not jaundiced  Findings: No bruising, erythema or rash  Neurological:      Mental Status: She is alert  Psychiatric:         Mood and Affect: Mood normal          Behavior: Behavior normal          Thought Content:  Thought content normal          Judgment: Judgment normal

## 2022-09-15 ENCOUNTER — ULTRASOUND (OUTPATIENT)
Dept: PERINATAL CARE | Facility: OTHER | Age: 32
End: 2022-09-15
Payer: COMMERCIAL

## 2022-09-15 VITALS
DIASTOLIC BLOOD PRESSURE: 72 MMHG | BODY MASS INDEX: 43.79 KG/M2 | TEMPERATURE: 128 F | HEIGHT: 67 IN | WEIGHT: 279 LBS | SYSTOLIC BLOOD PRESSURE: 132 MMHG

## 2022-09-15 DIAGNOSIS — O10.919 CHRONIC HYPERTENSION AFFECTING PREGNANCY: Primary | ICD-10-CM

## 2022-09-15 DIAGNOSIS — E66.01 MATERNAL MORBID OBESITY IN THIRD TRIMESTER, ANTEPARTUM (HCC): ICD-10-CM

## 2022-09-15 DIAGNOSIS — O99.213 MATERNAL MORBID OBESITY IN THIRD TRIMESTER, ANTEPARTUM (HCC): ICD-10-CM

## 2022-09-15 DIAGNOSIS — O24.410 DIET CONTROLLED GESTATIONAL DIABETES MELLITUS (GDM) IN THIRD TRIMESTER: ICD-10-CM

## 2022-09-15 DIAGNOSIS — Z3A.36 36 WEEKS GESTATION OF PREGNANCY: ICD-10-CM

## 2022-09-15 PROCEDURE — 76816 OB US FOLLOW-UP PER FETUS: CPT | Performed by: OBSTETRICS & GYNECOLOGY

## 2022-09-15 PROCEDURE — 59025 FETAL NON-STRESS TEST: CPT | Performed by: OBSTETRICS & GYNECOLOGY

## 2022-09-15 PROCEDURE — 99214 OFFICE O/P EST MOD 30 MIN: CPT | Performed by: OBSTETRICS & GYNECOLOGY

## 2022-09-15 NOTE — LETTER
NST sleeve cover sheet    Patient name: Jose Medina  : 1990  MRN: [id number]    [place]: Estimated Date of Delivery: 10/12/22    Obstetrician: _____________[facility]    Arianne Pedersen for testing:  _________________Obesity, CHTN_________________      Testing frequency:    ___ 2x/wk  _x_ 1x/wk  ___ Dopplers  ___ BPP?       Last growth scan: __________________________________________

## 2022-09-15 NOTE — PROGRESS NOTES
Non-Stress Testing:    Non-Stress test, equipment, procedure, and expected outcomes reviewed  Reviewed fetal kick counts and when to call OB  Edelmira Gomez Verified patient understanding of fetal kick counts with teach back method  Patient reports feeling daily fetal movements  Patient has no questions or concerns

## 2022-09-20 PROBLEM — Z3A.37 37 WEEKS GESTATION OF PREGNANCY: Status: ACTIVE | Noted: 2022-06-27

## 2022-09-21 ENCOUNTER — ROUTINE PRENATAL (OUTPATIENT)
Dept: OBGYN CLINIC | Facility: MEDICAL CENTER | Age: 32
End: 2022-09-21

## 2022-09-21 ENCOUNTER — TELEPHONE (OUTPATIENT)
Dept: OBGYN CLINIC | Facility: MEDICAL CENTER | Age: 32
End: 2022-09-21

## 2022-09-21 VITALS
WEIGHT: 279.8 LBS | DIASTOLIC BLOOD PRESSURE: 68 MMHG | SYSTOLIC BLOOD PRESSURE: 130 MMHG | HEIGHT: 67 IN | BODY MASS INDEX: 43.92 KG/M2

## 2022-09-21 DIAGNOSIS — O24.410 DIET CONTROLLED GESTATIONAL DIABETES MELLITUS (GDM) IN THIRD TRIMESTER: Primary | ICD-10-CM

## 2022-09-21 DIAGNOSIS — E66.01 MATERNAL MORBID OBESITY IN THIRD TRIMESTER, ANTEPARTUM (HCC): ICD-10-CM

## 2022-09-21 DIAGNOSIS — O99.213 OBESITY AFFECTING PREGNANCY IN THIRD TRIMESTER: ICD-10-CM

## 2022-09-21 DIAGNOSIS — O10.919 CHRONIC HYPERTENSION AFFECTING PREGNANCY: ICD-10-CM

## 2022-09-21 DIAGNOSIS — Z3A.37 37 WEEKS GESTATION OF PREGNANCY: ICD-10-CM

## 2022-09-21 DIAGNOSIS — O99.213 MATERNAL MORBID OBESITY IN THIRD TRIMESTER, ANTEPARTUM (HCC): ICD-10-CM

## 2022-09-21 PROCEDURE — 87150 DNA/RNA AMPLIFIED PROBE: CPT | Performed by: NURSE PRACTITIONER

## 2022-09-21 PROCEDURE — PNV: Performed by: NURSE PRACTITIONER

## 2022-09-21 NOTE — PROGRESS NOTES
Denies loss of fluid, vaginal bleeding and abdominal pain  Confirms frequent fetal movement  Doing fetal kick counts  Tolerating prenatal vitamin well  Stopped low-dose aspirin last week  Gestational diabetes fasting blood sugars 70 to 80s and 2 hour post prandials   Has follow-up with  Center tomorrow  Is recommended induction 38-39 gestational weeks  Bp: 130/68  Wt:+7lb   Plan  -continue prenatal vitamins daily  -continue fetal kick counts daily  -continue vaginal/perineal massage 1-4 times per week  -GDM encouraged to continue close contact with diabetes in pregnancy program  -GBS collected  No penicillin allergy  -follow-up  Center 22 for  fetal surveillance  -cHTN recommended delivery 38-39 gestational weeks  Message sent to surgical scheduler recommend p m  and a m  induction for cervical ripening reviewed with patient  -common discomforts of pregnancy and precautions reviewed  Signs and symptoms of labor reviewed    Written information provided about COVID 19  RTO 1 week

## 2022-09-21 NOTE — TELEPHONE ENCOUNTER
----- Message from Aaron Lewis, [address] sent at 9/21/2022 12:08 PM EDT -----  Hi,     Pt is recommended induction 38-39 weeks for cHTN & GDM     EDC 10/12/22  38 w 9/28/22  SVE 1/50/-3   Should have cervical ripening - PM into AM IOL     Thank you !!!

## 2022-09-22 ENCOUNTER — ULTRASOUND (OUTPATIENT)
Dept: PERINATAL CARE | Facility: OTHER | Age: 32
End: 2022-09-22
Payer: COMMERCIAL

## 2022-09-22 VITALS
HEIGHT: 67 IN | DIASTOLIC BLOOD PRESSURE: 92 MMHG | HEART RATE: 103 BPM | SYSTOLIC BLOOD PRESSURE: 126 MMHG | BODY MASS INDEX: 43.85 KG/M2 | WEIGHT: 279.4 LBS

## 2022-09-22 DIAGNOSIS — Z3A.37 37 WEEKS GESTATION OF PREGNANCY: Primary | ICD-10-CM

## 2022-09-22 DIAGNOSIS — O24.410 DIET CONTROLLED GESTATIONAL DIABETES MELLITUS (GDM) IN THIRD TRIMESTER: ICD-10-CM

## 2022-09-22 DIAGNOSIS — O10.919 CHRONIC HYPERTENSION AFFECTING PREGNANCY: ICD-10-CM

## 2022-09-22 DIAGNOSIS — O99.213 MATERNAL MORBID OBESITY IN THIRD TRIMESTER, ANTEPARTUM (HCC): ICD-10-CM

## 2022-09-22 DIAGNOSIS — E66.01 MATERNAL MORBID OBESITY IN THIRD TRIMESTER, ANTEPARTUM (HCC): ICD-10-CM

## 2022-09-22 PROCEDURE — 59025 FETAL NON-STRESS TEST: CPT | Performed by: STUDENT IN AN ORGANIZED HEALTH CARE EDUCATION/TRAINING PROGRAM

## 2022-09-22 PROCEDURE — 76815 OB US LIMITED FETUS(S): CPT | Performed by: STUDENT IN AN ORGANIZED HEALTH CARE EDUCATION/TRAINING PROGRAM

## 2022-09-22 NOTE — PATIENT INSTRUCTIONS
Kick Counts in Pregnancy   WHAT YOU NEED TO KNOW:   What do I need to know about kick counts? Kick counts measure how much your baby is moving in your womb  A kick from your baby can be felt as a twist, turn, swish, roll, or jab  It is common to feel your baby kicking at 26 to 28 weeks of pregnancy  You may feel your baby kick as early as 20 weeks of pregnancy  You may want to start counting at 28 weeks  Why should I measure kick counts? Your baby's movement may provide information about your baby's health  He or she may move less, or not at all, if there are problems  Your baby may move less if he or she is not getting enough oxygen or nutrition from the placenta  Do not smoke while you are pregnant  Smoking decreases the amount of oxygen that gets to your baby  Talk to your healthcare provider if you need help to quit smoking  Tell your healthcare provider as soon as you feel a change in your baby's movements  When do I measure kick counts? Measure kick counts at the same time every day  Measure kick counts when your baby is awake and most active  Your baby may be most active in the evening  How do I measure kick counts? Check that your baby is awake before you measure kick counts  You can wake up your baby by lightly pushing on your belly, walking, or drinking something cold  Your healthcare provider may tell you different ways to measure kick counts  You may be told to do the following:  Use a chart or clock to keep track of the time you start and finish counting  Sit in a chair or lie on your left side  Place your hands on the largest part of your belly  Count until you reach 10 kicks  Write down how much time it takes to count 10 kicks  It may take 30 minutes to 2 hours to count 10 kicks  It should not take more than 2 hours to count 10 kicks  When should I contact my doctor? You feel a change in the number of kicks or movements of your baby       You feel fewer than 10 kicks within 2 hours  You have questions or concerns about your baby's movements  CARE AGREEMENT:   You have the right to help plan your care  Learn about your health condition and how it may be treated  Discuss treatment options with your healthcare providers to decide what care you want to receive  You always have the right to refuse treatment  The above information is an  only  It is not intended as medical advice for individual conditions or treatments  Talk to your doctor, nurse or pharmacist before following any medical regimen to see if it is safe and effective for you  © Copyright [address] 2022 Information is for End User's use only and may not be sold, redistributed or otherwise used for commercial purposes   All illustrations and images included in CareNotes® are the copyrighted property of A D A M , Inc  or [address] [place] [address]

## 2022-09-22 NOTE — PROGRESS NOTES
[address]: Ms Deshawn Brasher was seen today for NST (found under the pregnancy episode) which I reviewed the RN assessment and agree, and SAMY (see ultrasound report under OB procedures tab)  See ultrasound report under "OB Procedures" tab  Please don't hesitate to contact our office with any concerns or questions    -Chente Corado MD

## 2022-09-23 LAB — GP B STREP DNA SPEC QL NAA+PROBE: NEGATIVE

## 2022-09-26 ENCOUNTER — ROUTINE PRENATAL (OUTPATIENT)
Dept: OBGYN CLINIC | Facility: MEDICAL CENTER | Age: 32
End: 2022-09-26

## 2022-09-26 VITALS
BODY MASS INDEX: 43.51 KG/M2 | SYSTOLIC BLOOD PRESSURE: 116 MMHG | DIASTOLIC BLOOD PRESSURE: 80 MMHG | WEIGHT: 277.2 LBS | HEIGHT: 67 IN

## 2022-09-26 DIAGNOSIS — Z34.93 THIRD TRIMESTER PREGNANCY: Primary | ICD-10-CM

## 2022-09-26 DIAGNOSIS — Z3A.37 37 WEEKS GESTATION OF PREGNANCY: ICD-10-CM

## 2022-09-26 DIAGNOSIS — O10.919 CHRONIC HYPERTENSION AFFECTING PREGNANCY: ICD-10-CM

## 2022-09-26 DIAGNOSIS — O99.213 OBESITY AFFECTING PREGNANCY IN THIRD TRIMESTER: ICD-10-CM

## 2022-09-26 DIAGNOSIS — O24.410 DIET CONTROLLED GESTATIONAL DIABETES MELLITUS (GDM) IN THIRD TRIMESTER: ICD-10-CM

## 2022-09-26 PROCEDURE — PNV: Performed by: OBSTETRICS & GYNECOLOGY

## 2022-09-26 NOTE — PROGRESS NOTES
Assessment  33 y o  Linda Plater at 37w5d presenting for routine prenatal visit  Plan  Diagnoses and all orders for this visit:    Third trimester pregnancy  37 weeks gestation of pregnancy  - Labor precautions  - FKC  - Return postpartum    Diet controlled gestational diabetes mellitus (GDM) in third trimester  Chronic hypertension affecting pregnancy  Obesity affecting pregnancy in third trimester  - Follows with MFM  - Antepartum fetal surveillance ongoing  - IOL 9/28 PM    ____________________________________________________________        Subjective    Zee Eden is a 33 y o  Linda Plater at 37w5d who presents for routine prenatal visit  She is without complaint  She reports some painless uterine tightening  Denies regular contractions, loss of fluid, or vaginal bleeding  She feels regular fetal movements  Pregnancy Problems:  Patient Active Problem List   Diagnosis    History of COVID-19    Obesity affecting pregnancy in third trimester    COVID-19 vaccine series completed    Maternal morbid obesity in third trimester, antepartum ([address] )    37 weeks gestation of pregnancy    Chronic hypertension affecting pregnancy    Gestational diabetes mellitus (GDM) in third trimester         Objective  /80   Ht 5' 7" (1 702 m)   Wt 126 kg (277 lb 3 2 oz)   LMP 01/05/2022   BMI 43 42 kg/m²     FHT: 136 BPM   Uterine Size: size equals dates     Physical Exam:  Physical Exam  Constitutional:       General: She is not in acute distress  Appearance: Normal appearance  She is well-developed  She is not ill-appearing, toxic-appearing or diaphoretic  HENT:      Head: Normocephalic and atraumatic  Eyes:      General: No scleral icterus  Right eye: No discharge  Left eye: No discharge  Conjunctiva/sclera: Conjunctivae normal    Pulmonary:      Effort: Pulmonary effort is normal  No accessory muscle usage or respiratory distress     Abdominal:      General: There is distension (gravid)  Tenderness: There is no abdominal tenderness  There is no guarding or rebound  Skin:     General: Skin is warm and dry  Coloration: Skin is not jaundiced  Findings: No bruising, erythema or rash  Neurological:      Mental Status: She is alert  Psychiatric:         Mood and Affect: Mood normal          Behavior: Behavior normal          Thought Content:  Thought content normal          Judgment: Judgment normal

## 2022-09-27 ENCOUNTER — HOSPITAL ENCOUNTER (INPATIENT)
Facility: HOSPITAL | Age: 32
LOS: 3 days | Discharge: HOME | End: 2022-09-30
Attending: OBSTETRICS & GYNECOLOGY | Admitting: OBSTETRICS & GYNECOLOGY
Payer: COMMERCIAL

## 2022-09-27 ENCOUNTER — TELEPHONE (OUTPATIENT)
Dept: OBGYN CLINIC | Facility: MEDICAL CENTER | Age: 32
End: 2022-09-27

## 2022-09-27 DIAGNOSIS — S01.111A EYEBROW LACERATION, RIGHT, INITIAL ENCOUNTER: Primary | ICD-10-CM

## 2022-09-27 DIAGNOSIS — Z3A.37 37 WEEKS GESTATION OF PREGNANCY: ICD-10-CM

## 2022-09-27 PROBLEM — S01.81XA FOREHEAD LACERATION: Status: ACTIVE | Noted: 2022-09-27

## 2022-09-27 LAB
ABO GROUP BLD: NORMAL
ALBUMIN SERPL BCP-MCNC: 2.6 G/DL (ref 3.5–5)
ALBUMIN SERPL BCP-MCNC: 2.9 G/DL (ref 3.5–5)
ALP SERPL-CCNC: 162 U/L (ref 46–116)
ALP SERPL-CCNC: 177 U/L (ref 46–116)
ALT SERPL W P-5'-P-CCNC: 57 U/L (ref 12–78)
ALT SERPL W P-5'-P-CCNC: 61 U/L (ref 12–78)
ANION GAP SERPL CALCULATED.3IONS-SCNC: 11 MMOL/L (ref 4–13)
ANION GAP SERPL CALCULATED.3IONS-SCNC: 11 MMOL/L (ref 4–13)
AST SERPL W P-5'-P-CCNC: 26 U/L (ref 5–45)
AST SERPL W P-5'-P-CCNC: 28 U/L (ref 5–45)
BILIRUB SERPL-MCNC: 0.26 MG/DL (ref 0.2–1)
BILIRUB SERPL-MCNC: 0.26 MG/DL (ref 0.2–1)
BLD GP AB SCN SERPL QL: NEGATIVE
BUN SERPL-MCNC: 12 MG/DL (ref 5–25)
BUN SERPL-MCNC: 13 MG/DL (ref 5–25)
CALCIUM ALBUM COR SERPL-MCNC: 10.8 MG/DL (ref 8.3–10.1)
CALCIUM ALBUM COR SERPL-MCNC: 11.5 MG/DL (ref 8.3–10.1)
CALCIUM SERPL-MCNC: 10.6 MG/DL (ref 8.3–10.1)
CALCIUM SERPL-MCNC: 9.7 MG/DL (ref 8.3–10.1)
CHLORIDE SERPL-SCNC: 103 MMOL/L (ref 96–108)
CHLORIDE SERPL-SCNC: 106 MMOL/L (ref 96–108)
CO2 SERPL-SCNC: 23 MMOL/L (ref 21–32)
CO2 SERPL-SCNC: 24 MMOL/L (ref 21–32)
CREAT SERPL-MCNC: 0.65 MG/DL (ref 0.6–1.3)
CREAT SERPL-MCNC: 0.68 MG/DL (ref 0.6–1.3)
CREAT UR-MCNC: 163.4 MG/DL
ERYTHROCYTE [DISTWIDTH] IN BLOOD BY AUTOMATED COUNT: 13.7 % (ref 11.6–15.1)
ERYTHROCYTE [DISTWIDTH] IN BLOOD BY AUTOMATED COUNT: 13.8 % (ref 11.6–15.1)
GFR SERPL CREATININE-BSD FRML MDRD: 116 ML/MIN/1.73SQ M
GFR SERPL CREATININE-BSD FRML MDRD: 118 ML/MIN/1.73SQ M
GLUCOSE SERPL-MCNC: 105 MG/DL (ref 65–140)
GLUCOSE SERPL-MCNC: 84 MG/DL (ref 65–140)
GLUCOSE SERPL-MCNC: 88 MG/DL (ref 65–140)
GLUCOSE SERPL-MCNC: 89 MG/DL (ref 65–140)
GLUCOSE SERPL-MCNC: 91 MG/DL (ref 65–140)
GLUCOSE SERPL-MCNC: 94 MG/DL (ref 65–140)
HCT VFR BLD AUTO: 38.3 % (ref 34.8–46.1)
HCT VFR BLD AUTO: 41 % (ref 34.8–46.1)
HGB BLD-MCNC: 12.6 G/DL (ref 11.5–15.4)
HGB BLD-MCNC: 13.6 G/DL (ref 11.5–15.4)
MAGNESIUM SERPL-MCNC: 1.4 MG/DL (ref 1.6–2.6)
MCH RBC QN AUTO: 29.9 PG (ref 26.8–34.3)
MCH RBC QN AUTO: 30.1 PG (ref 26.8–34.3)
MCHC RBC AUTO-ENTMCNC: 32.9 G/DL (ref 31.4–37.4)
MCHC RBC AUTO-ENTMCNC: 33.2 G/DL (ref 31.4–37.4)
MCV RBC AUTO: 90 FL (ref 82–98)
MCV RBC AUTO: 91 FL (ref 82–98)
PLATELET # BLD AUTO: 255 THOUSANDS/UL (ref 149–390)
PLATELET # BLD AUTO: 284 THOUSANDS/UL (ref 149–390)
PMV BLD AUTO: 10.2 FL (ref 8.9–12.7)
PMV BLD AUTO: 10.3 FL (ref 8.9–12.7)
POTASSIUM SERPL-SCNC: 3.7 MMOL/L (ref 3.5–5.3)
POTASSIUM SERPL-SCNC: 4.2 MMOL/L (ref 3.5–5.3)
PROT SERPL-MCNC: 7.9 G/DL (ref 6.4–8.4)
PROT SERPL-MCNC: 8.6 G/DL (ref 6.4–8.4)
PROT UR-MCNC: 189 MG/DL
PROT/CREAT UR: 1.16 MG/G{CREAT} (ref 0–0.1)
RBC # BLD AUTO: 4.19 MILLION/UL (ref 3.81–5.12)
RBC # BLD AUTO: 4.55 MILLION/UL (ref 3.81–5.12)
RH BLD: POSITIVE
SODIUM SERPL-SCNC: 138 MMOL/L (ref 135–147)
SODIUM SERPL-SCNC: 140 MMOL/L (ref 135–147)
SPECIMEN EXPIRATION DATE: NORMAL
WBC # BLD AUTO: 13.73 THOUSAND/UL (ref 4.31–10.16)
WBC # BLD AUTO: 14.2 THOUSAND/UL (ref 4.31–10.16)

## 2022-09-27 PROCEDURE — 4A1HXCZ MONITORING OF PRODUCTS OF CONCEPTION, CARDIAC RATE, EXTERNAL APPROACH: ICD-10-PCS | Performed by: OBSTETRICS & GYNECOLOGY

## 2022-09-27 PROCEDURE — 86900 BLOOD TYPING SEROLOGIC ABO: CPT

## 2022-09-27 PROCEDURE — 80053 COMPREHEN METABOLIC PANEL: CPT

## 2022-09-27 PROCEDURE — 85027 COMPLETE CBC AUTOMATED: CPT

## 2022-09-27 PROCEDURE — 86850 RBC ANTIBODY SCREEN: CPT

## 2022-09-27 PROCEDURE — NC001 PR NO CHARGE: Performed by: OBSTETRICS & GYNECOLOGY

## 2022-09-27 PROCEDURE — 99203 OFFICE O/P NEW LOW 30 MIN: CPT

## 2022-09-27 PROCEDURE — 84156 ASSAY OF PROTEIN URINE: CPT

## 2022-09-27 PROCEDURE — 82948 REAGENT STRIP/BLOOD GLUCOSE: CPT

## 2022-09-27 PROCEDURE — 82570 ASSAY OF URINE CREATININE: CPT

## 2022-09-27 PROCEDURE — 0HQ1XZZ REPAIR FACE SKIN, EXTERNAL APPROACH: ICD-10-PCS | Performed by: SURGERY

## 2022-09-27 PROCEDURE — 86901 BLOOD TYPING SEROLOGIC RH(D): CPT

## 2022-09-27 PROCEDURE — 3E0P7VZ INTRODUCTION OF HORMONE INTO FEMALE REPRODUCTIVE, VIA NATURAL OR ARTIFICIAL OPENING: ICD-10-PCS | Performed by: OBSTETRICS & GYNECOLOGY

## 2022-09-27 PROCEDURE — 86592 SYPHILIS TEST NON-TREP QUAL: CPT

## 2022-09-27 PROCEDURE — 99254 IP/OBS CNSLTJ NEW/EST MOD 60: CPT | Performed by: SURGERY

## 2022-09-27 PROCEDURE — 83735 ASSAY OF MAGNESIUM: CPT

## 2022-09-27 PROCEDURE — 12011 RPR F/E/E/N/L/M 2.5 CM/<: CPT | Performed by: SURGERY

## 2022-09-27 RX ORDER — LABETALOL HYDROCHLORIDE 5 MG/ML
20 INJECTION, SOLUTION INTRAVENOUS ONCE
Status: DISCONTINUED | OUTPATIENT
Start: 2022-09-27 | End: 2022-09-28

## 2022-09-27 RX ORDER — SODIUM CHLORIDE 9 MG/ML
50 INJECTION, SOLUTION INTRAVENOUS CONTINUOUS
Status: DISCONTINUED | OUTPATIENT
Start: 2022-09-27 | End: 2022-09-28

## 2022-09-27 RX ORDER — MAGNESIUM SULFATE HEPTAHYDRATE 40 MG/ML
2 INJECTION, SOLUTION INTRAVENOUS ONCE
Status: COMPLETED | OUTPATIENT
Start: 2022-09-27 | End: 2022-09-27

## 2022-09-27 RX ORDER — SODIUM CHLORIDE 9 MG/ML
125 INJECTION, SOLUTION INTRAVENOUS CONTINUOUS
Status: DISCONTINUED | OUTPATIENT
Start: 2022-09-27 | End: 2022-09-27

## 2022-09-27 RX ORDER — GINSENG 100 MG
1 CAPSULE ORAL ONCE
Status: COMPLETED | OUTPATIENT
Start: 2022-09-27 | End: 2022-09-27

## 2022-09-27 RX ORDER — LIDOCAINE HYDROCHLORIDE AND EPINEPHRINE 10; 10 MG/ML; UG/ML
20 INJECTION, SOLUTION INFILTRATION; PERINEURAL ONCE
Status: COMPLETED | OUTPATIENT
Start: 2022-09-27 | End: 2022-09-27

## 2022-09-27 RX ORDER — OXYTOCIN/RINGER'S LACTATE 30/500 ML
1-30 PLASTIC BAG, INJECTION (ML) INTRAVENOUS
Status: DISCONTINUED | OUTPATIENT
Start: 2022-09-27 | End: 2022-09-28

## 2022-09-27 RX ORDER — MAGNESIUM SULFATE HEPTAHYDRATE 40 MG/ML
2 INJECTION, SOLUTION INTRAVENOUS CONTINUOUS
Status: DISCONTINUED | OUTPATIENT
Start: 2022-09-27 | End: 2022-09-29

## 2022-09-27 RX ORDER — CALCIUM GLUCONATE 94 MG/ML
1 INJECTION, SOLUTION INTRAVENOUS ONCE AS NEEDED
Status: DISCONTINUED | OUTPATIENT
Start: 2022-09-27 | End: 2022-09-29

## 2022-09-27 RX ORDER — MAGNESIUM SULFATE HEPTAHYDRATE 40 MG/ML
4 INJECTION, SOLUTION INTRAVENOUS ONCE
Status: COMPLETED | OUTPATIENT
Start: 2022-09-27 | End: 2022-09-27

## 2022-09-27 RX ADMIN — Medication 2 MILLI-UNITS/MIN: at 21:39

## 2022-09-27 RX ADMIN — MAGNESIUM SULFATE HEPTAHYDRATE 4 G: 40 INJECTION, SOLUTION INTRAVENOUS at 18:26

## 2022-09-27 RX ADMIN — SODIUM CHLORIDE 125 ML/HR: 0.9 INJECTION, SOLUTION INTRAVENOUS at 13:25

## 2022-09-27 RX ADMIN — LIDOCAINE HYDROCHLORIDE,EPINEPHRINE BITARTRATE 3 ML: 10; .01 INJECTION, SOLUTION INFILTRATION; PERINEURAL at 19:15

## 2022-09-27 RX ADMIN — MAGNESIUM SULFATE HEPTAHYDRATE 2 G: 40 INJECTION, SOLUTION INTRAVENOUS at 18:54

## 2022-09-27 RX ADMIN — BACITRACIN ZINC 1 LARGE APPLICATION: 500 OINTMENT TOPICAL at 19:15

## 2022-09-27 RX ADMIN — Medication 25 MCG: at 16:37

## 2022-09-27 RX ADMIN — MAGNESIUM SULFATE HEPTAHYDRATE 2 G/HR: 40 INJECTION, SOLUTION INTRAVENOUS at 19:11

## 2022-09-27 NOTE — ASSESSMENT & PLAN NOTE
Systolic (34BZH), KPW:864 , Min:107 , PME:956      Diastolic (97RIH), KOB:07, Min:55, Max:77      PreE Labs wnl   P:C ratio: 1 16

## 2022-09-27 NOTE — PROCEDURES
Laceration repair    Date/Time: 9/27/2022 7:35 PM  Performed by: Peter Bermudez MD  Authorized by: Peter Bermudez MD   Consent: Written consent obtained  Consent given by: patient  Patient understanding: patient states understanding of the procedure being performed  Patient consent: the patient's understanding of the procedure matches consent given  Patient identity confirmed: verbally with patient  Time out: Immediately prior to procedure a "time out" was called to verify the correct patient, procedure, equipment, support staff and site/side marked as required  Body area: head/neck  Location details: right eyebrow  Laceration length: 1 5 cm  Nerve involvement: none  Vascular damage: no  Anesthesia: local infiltration    Anesthesia:  Local Anesthetic: lidocaine 1% with epinephrine  Anesthetic total: 3 mL    Sedation:  Patient sedated: no      Wound Dehiscence:  Superficial Wound Dehiscence: simple closure      Procedure Details:  Preparation: Patient was prepped and draped in the usual sterile fashion  Irrigation solution: saline  Skin closure: 4-0 Prolene  Number of sutures: 5  Technique: simple  Approximation: close  Approximation difficulty: simple  Dressing: antibiotic ointment  Comments: Betadine prep, 3cc local anesthesia (discussed with OB team, ok to use judicious amount of lidocaine with epinephrine), saline lavage, five simple interrupted sutures of 4-0 Prolene  Patient tolerated procedure well

## 2022-09-27 NOTE — OB LABOR/OXYTOCIN SAFETY PROGRESS
Labor Progress Note - Zee Guajardo 33 y o  female MRN: [id number]    Unit/Bed#: L&D 326-01 Encounter: [id number]       Contraction Frequency (minutes): irregular  Contraction Quality: Mild  Tachysystole: No   Cervical Dilation: 1        Cervical Effacement: 50  Fetal Station: -3  Baseline Rate: 130 bpm     FHR Category: Category I               Vital Signs:   Vitals:    09/27/22 1800   BP:    Pulse:    Resp: 16   Temp:    SpO2:        Notes/comments:  Patient noted to have an un-sustained SRBP of 160/74 with repeat of 152/72  As she has now had SRBP  by >4hr, she meets criteria for [facility] with superimposed preeclampsia with severe features  Will start magnesium infusion for seizure prophylaxis  Examined patient who is uncomfortable with scott balloon in place but feeling well otherwise, denying headache, vision changes, chest pain, SOB, RUQ/epigastric pain, or increased swelling  She will report if she develops any of these symptoms  Will obtain labs now and every 6 hours moving forward  FHT category 1 with contractions every 5-7 min  Dr Tasha Koenig aware          Ryan Fuentes MD 9/27/2022 6:14 PM

## 2022-09-27 NOTE — H&P
[address]  Josy 33 y o  female MRN: [id number]  Unit/Bed#: L&D 326-01 Encounter: [id number]    Assessment: 33 y o   at 37w6d admitted for IOL for CHTN   SVE: /-3  FHT:  Baseline of 125/moderate variability/15 x 15 accels present/no decelerations  Category 1 tracing  Clinical EFW: 70th percentile ; Cephalic confirmed by ultrasound  GBS status: Negative     Plan:   Forehead laceration  Assessment & Plan  Sustained in   Appears to require sutures  Gen surg consulted  Gestational diabetes mellitus (GDM) in third trimester  Assessment & Plan  Lab Results   Component Value Date    HGBA1C 4 9 2022       No results for input(s): POCGLU in the last 72 hours  Blood Sugar Average: Last 72 hrs:      Chronic hypertension affecting pregnancy  Assessment & Plan  Systolic (85JQT), WOR:537 , Min:116 , OHB:007      Diastolic (63YZP), NWW:90, Min:80, Max:96      PreE Labs wnl   P:C ratio:         * 37 weeks gestation of pregnancy  Assessment & Plan  Admit to [facility]   Clear liquid diet   F/u T&S, CBC, RPR   IVF NS 125cc/hr   Continuous fetal monitoring and tocometry   Analgesia at maternal request   [facility] by TAUS  Induction plan: scott balloon, cytotec, pitocin            Discussed case and plan w/ Dr Anita Ness      Chief Complaint: fell down stairs    HPI: Racheal Kidd is a 33 y o  Tory Erice with an KENTON of 10/12/2022, by Last Menstrual Period at 37w6d who is being admitted for IOL for [facility]  She complains of fever, has no LOF, and reports no VB  She states she has felt good FM  Patient had a fall down the stairs around 11am this morning  She does not believe that she hit her belly but did hit her side and the right side of her face  She presented to L&D for NST and at that time was noted to have back-to-back severe range blood pressures requiring treatment  The patient previously carried a diagnosis of chronic hypertension not requiring medication   The patient was previously scheduled to have an induction tomorrow evening and therefore the decision was to keep her and induce today  Patient Active Problem List   Diagnosis    History of COVID-19    Obesity affecting pregnancy in third trimester    COVID-19 vaccine series completed    Maternal morbid obesity in third trimester, antepartum ([facility] [address]    37 weeks gestation of pregnancy    Chronic hypertension affecting pregnancy    Gestational diabetes mellitus (GDM) in third trimester    Forehead laceration       Baby complications/comments: none    Review of Systems   Constitutional: Negative for chills and fever  Respiratory: Negative for cough, shortness of breath and wheezing  Cardiovascular: Negative for chest pain and leg swelling  Gastrointestinal: Negative for abdominal pain, diarrhea, nausea and vomiting  Genitourinary: Negative for pelvic pain, vaginal bleeding and vaginal discharge  Musculoskeletal: Negative for back pain  Neurological: Negative for weakness, light-headedness and headaches         OB Hx:  OB History    Para Term  AB Living   1 0 0 0 0 0   SAB IAB Ectopic Multiple Live Births   0 0 0 0 0      # Outcome Date GA Lbr Lukas/2nd Weight Sex Delivery Anes PTL Lv   1 Current                Past Medical Hx:  Past Medical History:   Diagnosis Date    Chronic hypertension affecting pregnancy 2022    Gallstones     Gestational diabetes mellitus (GDM) in third trimester 2022    History of COVID-19     2021-no hospitalization    Varicella     vaccine    Wears glasses        Past Surgical hx:  Past Surgical History:   Procedure Laterality Date    CHOLECYSTECTOMY      KNEE SURGERY Right     SD LAP,CHOLECYSTECTOMY N/A 12/15/2021    Procedure: CHOLECYSTECTOMY LAPAROSCOPIC W/ ROBOTICS;  Surgeon: Alli Sawyer MD;  [facility];  Service: General    WISDOM TOOTH EXTRACTION         Social Hx:  Alcohol use: denies  Tobacco use: denies  Other substance use: denies    No Known Allergies    Medications Prior to Admission   Medication    Blood Glucose Monitoring Suppl (Contour Next EZ) w/Device KIT    Contour Next Test test strip    Microlet Lancets MISC    Prenatal MV & Min w/FA-DHA (ONE A DAY PRENATAL PO)       Objective:  Temp:  [97 9 °F (36 6 °C)-98 6 °F (37 °C)] 97 9 °F (36 6 °C)  HR:  [] 89  Resp:  [17-18] 17  BP: (134-168)/(81-96) 134/81  Body mass index is 43 38 kg/m²  Physical Exam:  Physical Exam  Constitutional:       Appearance: Normal appearance  Cardiovascular:      Rate and Rhythm: Normal rate and regular rhythm  Pulmonary:      Effort: Pulmonary effort is normal  No respiratory distress  Abdominal:      Palpations: Abdomen is soft  Tenderness: There is no abdominal tenderness  Musculoskeletal:         General: No swelling or tenderness  Neurological:      General: No focal deficit present  Mental Status: She is alert and oriented to person, place, and time  Skin:     General: Skin is warm and dry  Comments: Facial laceration noted on the right eyebrow  Vitals reviewed              FHT:  Baseline Rate: 130 bpm  Variability: Moderate 6-25 bpm  Accelerations: 15 x 15 or greater  Decelerations: None  FHR Category: Category I    TOCO:   Contraction Frequency (minutes): occasional  Contraction Duration (seconds): 50-80  Contraction Quality: Mild    Lab Results   Component Value Date    WBC 14 20 (H) 09/27/2022    HGB 13 6 09/27/2022    HCT 41 0 09/27/2022     09/27/2022     Lab Results   Component Value Date    K 4 2 09/27/2022     09/27/2022    CO2 24 09/27/2022    BUN 13 09/27/2022    CREATININE 0 65 09/27/2022    AST 28 09/27/2022    ALT 61 09/27/2022     Prenatal Labs: Reviewed      Blood type: A+  Antibody: Negative  GBS: Negative   HIV: Non-reactive  Rubella: Immune  VDRL/RPR: Non reactive  HBsAg: Negative  Chlamydia: Negative  Gonorrhea: Negative  Diabetes 1 hour screen: 139  3 hour glucose: 206->104  Platelets: 284  Hgb: 13 6  >2 Midnights  INPATIENT     Signature/Title: Becki Newsome MD  Date: 9/27/2022  Time: 4:03 PM

## 2022-09-27 NOTE — TELEPHONE ENCOUNTER
Pt called stated she's is 37w6d  had a fall down the stairs thinks she hit her belly  No  vaginal bleeding  Doctor advised to go to L&D to make sure everything is fine  Pt is aware gave pt address and phone number

## 2022-09-27 NOTE — ASSESSMENT & PLAN NOTE
Admit to OBGYN   Clear liquid diet   F/u T&S, CBC, RPR   IVF NS 125cc/hr   Continuous fetal monitoring and tocometry   Analgesia at maternal request   [facility] by TAUS  Induction plan: scott balloon, cytotec, pitocin

## 2022-09-27 NOTE — ASSESSMENT & PLAN NOTE
Lab Results   Component Value Date    HGBA1C 4 9 04/01/2022       No results for input(s): POCGLU in the last 72 hours      Blood Sugar Average: Last 72 hrs:

## 2022-09-27 NOTE — OB LABOR/OXYTOCIN SAFETY PROGRESS
Labor Progress Note - Collin Rachel 33 y o  female MRN: [id number]    Unit/Bed#: L&D 326-01 Encounter: [id number]       Contraction Frequency (minutes): occasional  Contraction Quality: Mild  Tachysystole: No   Cervical Dilation: 1        Cervical Effacement: 50  Fetal Station: -3  Baseline Rate: 130 bpm     FHR Category: Category I               Vital Signs:   Vitals:    09/27/22 1640   BP: 144/91   Pulse: 99   Resp:    Temp:    SpO2:        Notes/comments:   FHT:  Baseline of 130/moderate variability/15 x 15 accels present/no decelerations  Category 1 tracing  On cervical recheck patient is now 1/50/-3  Patient is currently comfortable without epidural   Cytotec placed at this time  PROCEDURE:  SCOTT BALLOON PLACEMENT    A 24F scott with a 30cc balloon was selected, SVE was performed and cervix was located, scott was introduced over sterile gloved hands  Balloon advanced through cervix beyond the internal cervical os  A small amount amount of sterile saline solution was instilled in the balloon to confirm placement  Placement was confirmed to be beyond the internal cervical os  A total of 60cc of sterile saline solution was placed into the balloon  Pt tolerated well  Instructions left with RN to place scott to gravity with a 1L bag of IV fluid  Notify MD when scott dislodged          Daniele Vidales MD 9/27/2022 4:46 PM

## 2022-09-27 NOTE — CONSULTS
Consultation - General Surgery  : [facility] Surgery Resident role on [employer]  Zee Foster 33 y o  female MRN: [id number]  Unit/Bed#: L&D 326-01 Encounter: [id number]        Assessment:  33y o  year old female with R forehead laceration after falling down stairs, 37w pregnant    Plan:  Discussed with Dr Philomena Singer  Bedside laceration repair  Wound check tomorrow  Outpatient follow-up for suture removal in 7d    HPI:  Di Ocampo is a 33 y o  female with a history of HTN who is 37w pregnant  She suffered a mechanical fall down stairs earlier today, stating that she struck her forehead  She presented emergency room because she also suffered impact to her abdomen, and was concerned about her pregnancy  She was found to have preeclampsia prompting admission to L and D service  General surgery consulted due to right forehead laceration  Per obstetrics team, no concern for intimate partner violence  Denies use of blood thinners, no previous issues with bleeding, no history of poor wound healing  Physical Exam  Constitutional:       General: She is not in acute distress  Appearance: Normal appearance  HENT:      Head: Normocephalic and atraumatic  Right Ear: External ear normal       Left Ear: External ear normal       Nose: Nose normal       Mouth/Throat:      Mouth: Mucous membranes are moist       Pharynx: Oropharynx is clear  Eyes:      General:         Right eye: No discharge  Left eye: No discharge  Extraocular Movements: Extraocular movements intact  Conjunctiva/sclera: Conjunctivae normal       Pupils: Pupils are equal, round, and reactive to light  Cardiovascular:      Rate and Rhythm: Normal rate  Pulses: Normal pulses  Heart sounds: Normal heart sounds  Pulmonary:      Effort: Pulmonary effort is normal  No respiratory distress  Abdominal:      General: Abdomen is flat  There is no distension  Tenderness:  There is no abdominal tenderness  There is no guarding or rebound  Musculoskeletal:         General: No swelling, tenderness or signs of injury  Cervical back: Normal range of motion and neck supple  No rigidity or tenderness  Skin:     Coloration: Skin is not jaundiced  Findings: Lesion (R forehead laceration, approximately 1 5 cm) present  Neurological:      General: No focal deficit present  Mental Status: She is alert and oriented to person, place, and time  Mental status is at baseline  Psychiatric:         Mood and Affect: Mood normal          Behavior: Behavior normal               Review of Systems   Constitutional: Negative for chills and fever  HENT: Negative for ear pain and sore throat  Eyes: Negative for pain and visual disturbance  Respiratory: Negative for cough and shortness of breath  Cardiovascular: Negative for chest pain and palpitations  Gastrointestinal: Negative for abdominal pain and vomiting  Genitourinary: Negative for dysuria and hematuria  Musculoskeletal: Negative for arthralgias and back pain  Skin: Negative for color change and rash  Neurological: Negative for seizures and syncope  All other systems reviewed and are negative         Objective         Intake/Output Summary (Last 24 hours) at 9/27/2022 1845  Last data filed at 9/27/2022 1816  Gross per 24 hour   Intake 606 25 ml   Output --   Net 606 25 ml       First Vitals:   Blood Pressure: 161/96 (09/27/22 1300)  Pulse: 100 (09/27/22 1300)  Temperature: 98 6 °F (37 °C) (09/27/22 1329)  Temp Source: Oral (09/27/22 1329)  Respirations: 18 (09/27/22 1329)  Height: 5' 7" (170 2 cm) (09/27/22 1329)  Weight - Scale: 126 kg (277 lb) (09/27/22 1329)  SpO2: 97 % (09/27/22 1249)    Current Vitals:   Blood Pressure: 146/75 (09/27/22 1825)  Pulse: 88 (09/27/22 1825)  Temperature: 98 2 °F (36 8 °C) (09/27/22 1622)  Temp Source: Oral (09/27/22 1622)  Respirations: 16 (09/27/22 1800)  Height: 5' 7" (170 2 cm) (09/27/22 1329)  Weight - Scale: 126 kg (277 lb) (09/27/22 1329)  SpO2: 99 % (09/27/22 1300)    Invasive Devices  Report    Peripheral Intravenous Line  Duration           Peripheral IV 09/27/22 Dorsal (posterior); Right Hand <1 day                  Historical Information   Past Medical History:   Diagnosis Date    Chronic hypertension affecting pregnancy 6/27/2022    Gallstones     Gestational diabetes mellitus (GDM) in third trimester 7/25/2022    History of COVID-19 March 2021-no hospitalization    Varicella     vaccine    Wears glasses      Past Surgical History:   Procedure Laterality Date    CHOLECYSTECTOMY      KNEE SURGERY Right     NV LAP,CHOLECYSTECTOMY N/A 12/15/2021    Procedure: CHOLECYSTECTOMY LAPAROSCOPIC W/ ROBOTICS;  Surgeon: Jennifer Powers MD;  Location: [facility];  Service: General    WISDOM TOOTH EXTRACTION       Social History   Social History     Substance and Sexual Activity   Alcohol Use Not Currently    Comment: social     Social History     Substance and Sexual Activity   Drug Use Never     Social History     Tobacco Use   Smoking Status Never Smoker   Smokeless Tobacco Never Used     Family History   Problem Relation Age of Onset   [facility] Breast cancer Mother     Hyperparathyroidism Mother     Hypertension Mother     Hypertension Father     Hyperlipidemia Father     Diabetes Maternal Grandmother     Hypertension Maternal Grandmother     Heart failure Maternal Grandmother     Heart disease Maternal Grandmother     Skin cancer Maternal Grandmother     No Known Problems Maternal Grandfather     Aneurysm Paternal Grandmother     COPD Paternal Grandmother     Heart attack Paternal Grandfather        Meds/Allergies   all current active meds have been reviewed, current meds:   Current Facility-Administered Medications   Medication Dose Route Frequency    bacitracin topical ointment 1 large application  1 large application Topical Once    calcium gluconate 10 % injection 1 g  1 g Intravenous Once PRN    labetalol (NORMODYNE) injection 20 mg  20 mg Intravenous Once    lidocaine-epinephrine (XYLOCAINE/EPINEPHRINE) 1 %-1:100,000 injection 20 mL  20 mL Infiltration Once    magnesium sulfate 4 g/100 mL IVPB (premix) 4 g  4 g Intravenous Once    Followed by    magnesium sulfate 2 g/50 mL IVPB (premix) 2 g  2 g Intravenous Once    magnesium sulfate 20 g/500 mL infusion (premix)  2 g/hr Intravenous Continuous    sodium chloride 0 9 % infusion  50 mL/hr Intravenous Continuous    and PTA meds:   Prior to Admission Medications   Prescriptions Last Dose Informant Patient Reported? Taking? Blood Glucose Monitoring Suppl (Contour Next EZ) w/Device KIT 9/27/2022 at Unknown time Self No Yes   Sig: Dispense 1 kit per insurance formulary  Gestational diabetes  Contour Next Test test strip 9/27/2022 at Unknown time Self No Yes   Sig: Test 4 times per day or as directed  Gestational diabetes  Microlet Lancets MISC 9/27/2022 at Unknown time Self No Yes   Sig: Use 4 a day or as directed  Gestational diabetes  Prenatal MV & Min w/FA-DHA (ONE A DAY PRENATAL PO) 9/27/2022 at 0500 Self Yes Yes      Facility-Administered Medications: None     No Known Allergies    Lab Results: I have personally reviewed pertinent lab results  , CBC:   Lab Results   Component Value Date    WBC 13 73 (H) 09/27/2022    HGB 12 6 09/27/2022    HCT 38 3 09/27/2022    MCV 91 09/27/2022     09/27/2022    MCH 30 1 09/27/2022    MCHC 32 9 09/27/2022    RDW 13 8 09/27/2022    MPV 10 3 09/27/2022   , CMP:   Lab Results   Component Value Date    SODIUM 138 09/27/2022    K 4 2 09/27/2022     09/27/2022    CO2 24 09/27/2022    BUN 13 09/27/2022    CREATININE 0 65 09/27/2022    CALCIUM 10 6 (H) 09/27/2022    AST 28 09/27/2022    ALT 61 09/27/2022    ALKPHOS 177 (H) 09/27/2022    EGFR 118 09/27/2022       Counseling / Coordination of Care  Total floor / unit time spent today 25 minutes    Greater than 50% of total time was spent with the patient and / or family counseling and / or coordination of care      Kerline Florentino MD  9/27/2022 6:45 PM

## 2022-09-28 ENCOUNTER — ANESTHESIA EVENT (INPATIENT)
Dept: ANESTHESIOLOGY | Facility: HOSPITAL | Age: 32
End: 2022-09-28
Payer: COMMERCIAL

## 2022-09-28 ENCOUNTER — ANESTHESIA (INPATIENT)
Dept: ANESTHESIOLOGY | Facility: HOSPITAL | Age: 32
End: 2022-09-28
Payer: COMMERCIAL

## 2022-09-28 LAB
ALBUMIN SERPL BCP-MCNC: 2.1 G/DL (ref 3.5–5)
ALBUMIN SERPL BCP-MCNC: 2.2 G/DL (ref 3.5–5)
ALBUMIN SERPL BCP-MCNC: 2.7 G/DL (ref 3.5–5)
ALBUMIN SERPL BCP-MCNC: 2.9 G/DL (ref 3.5–5)
ALP SERPL-CCNC: 130 U/L (ref 46–116)
ALP SERPL-CCNC: 140 U/L (ref 46–116)
ALP SERPL-CCNC: 170 U/L (ref 46–116)
ALP SERPL-CCNC: 175 U/L (ref 46–116)
ALT SERPL W P-5'-P-CCNC: 50 U/L (ref 12–78)
ALT SERPL W P-5'-P-CCNC: 58 U/L (ref 12–78)
ALT SERPL W P-5'-P-CCNC: 59 U/L (ref 12–78)
ALT SERPL W P-5'-P-CCNC: 63 U/L (ref 12–78)
ANION GAP SERPL CALCULATED.3IONS-SCNC: 11 MMOL/L (ref 4–13)
ANION GAP SERPL CALCULATED.3IONS-SCNC: 12 MMOL/L (ref 4–13)
ANION GAP SERPL CALCULATED.3IONS-SCNC: 15 MMOL/L (ref 4–13)
ANION GAP SERPL CALCULATED.3IONS-SCNC: 8 MMOL/L (ref 4–13)
AST SERPL W P-5'-P-CCNC: 26 U/L (ref 5–45)
AST SERPL W P-5'-P-CCNC: 29 U/L (ref 5–45)
AST SERPL W P-5'-P-CCNC: 31 U/L (ref 5–45)
AST SERPL W P-5'-P-CCNC: 31 U/L (ref 5–45)
BASE EXCESS BLDCOA CALC-SCNC: -8 MMOL/L (ref 3–11)
BASE EXCESS BLDCOV CALC-SCNC: -6.6 MMOL/L (ref 1–9)
BILIRUB SERPL-MCNC: 0.25 MG/DL (ref 0.2–1)
BILIRUB SERPL-MCNC: 0.35 MG/DL (ref 0.2–1)
BILIRUB SERPL-MCNC: 0.44 MG/DL (ref 0.2–1)
BILIRUB SERPL-MCNC: 0.44 MG/DL (ref 0.2–1)
BUN SERPL-MCNC: 10 MG/DL (ref 5–25)
BUN SERPL-MCNC: 10 MG/DL (ref 5–25)
BUN SERPL-MCNC: 7 MG/DL (ref 5–25)
BUN SERPL-MCNC: 9 MG/DL (ref 5–25)
CALCIUM ALBUM COR SERPL-MCNC: 10 MG/DL (ref 8.3–10.1)
CALCIUM ALBUM COR SERPL-MCNC: 9.1 MG/DL (ref 8.3–10.1)
CALCIUM ALBUM COR SERPL-MCNC: 9.5 MG/DL (ref 8.3–10.1)
CALCIUM ALBUM COR SERPL-MCNC: 9.6 MG/DL (ref 8.3–10.1)
CALCIUM SERPL-MCNC: 7.7 MG/DL (ref 8.3–10.1)
CALCIUM SERPL-MCNC: 8 MG/DL (ref 8.3–10.1)
CALCIUM SERPL-MCNC: 8.6 MG/DL (ref 8.3–10.1)
CALCIUM SERPL-MCNC: 9.1 MG/DL (ref 8.3–10.1)
CHLORIDE SERPL-SCNC: 103 MMOL/L (ref 96–108)
CHLORIDE SERPL-SCNC: 105 MMOL/L (ref 96–108)
CO2 SERPL-SCNC: 19 MMOL/L (ref 21–32)
CO2 SERPL-SCNC: 20 MMOL/L (ref 21–32)
CO2 SERPL-SCNC: 22 MMOL/L (ref 21–32)
CO2 SERPL-SCNC: 24 MMOL/L (ref 21–32)
CREAT SERPL-MCNC: 0.6 MG/DL (ref 0.6–1.3)
CREAT SERPL-MCNC: 0.66 MG/DL (ref 0.6–1.3)
CREAT SERPL-MCNC: 0.68 MG/DL (ref 0.6–1.3)
CREAT SERPL-MCNC: 0.84 MG/DL (ref 0.6–1.3)
ERYTHROCYTE [DISTWIDTH] IN BLOOD BY AUTOMATED COUNT: 13.6 % (ref 11.6–15.1)
ERYTHROCYTE [DISTWIDTH] IN BLOOD BY AUTOMATED COUNT: 13.6 % (ref 11.6–15.1)
ERYTHROCYTE [DISTWIDTH] IN BLOOD BY AUTOMATED COUNT: 13.8 % (ref 11.6–15.1)
ERYTHROCYTE [DISTWIDTH] IN BLOOD BY AUTOMATED COUNT: 14 % (ref 11.6–15.1)
GFR SERPL CREATININE-BSD FRML MDRD: 116 ML/MIN/1.73SQ M
GFR SERPL CREATININE-BSD FRML MDRD: 118 ML/MIN/1.73SQ M
GFR SERPL CREATININE-BSD FRML MDRD: 121 ML/MIN/1.73SQ M
GFR SERPL CREATININE-BSD FRML MDRD: 92 ML/MIN/1.73SQ M
GLUCOSE SERPL-MCNC: 118 MG/DL (ref 65–140)
GLUCOSE SERPL-MCNC: 201 MG/DL (ref 65–140)
GLUCOSE SERPL-MCNC: 86 MG/DL (ref 65–140)
GLUCOSE SERPL-MCNC: 88 MG/DL (ref 65–140)
GLUCOSE SERPL-MCNC: 93 MG/DL (ref 65–140)
GLUCOSE SERPL-MCNC: 95 MG/DL (ref 65–140)
HCO3 BLDCOA-SCNC: 20.4 MMOL/L (ref 17.3–27.3)
HCO3 BLDCOV-SCNC: 15.5 MMOL/L (ref 12.2–28.6)
HCT VFR BLD AUTO: 31 % (ref 34.8–46.1)
HCT VFR BLD AUTO: 34.3 % (ref 34.8–46.1)
HCT VFR BLD AUTO: 39.4 % (ref 34.8–46.1)
HCT VFR BLD AUTO: 40 % (ref 34.8–46.1)
HGB BLD-MCNC: 10.1 G/DL (ref 11.5–15.4)
HGB BLD-MCNC: 11.6 G/DL (ref 11.5–15.4)
HGB BLD-MCNC: 12.8 G/DL (ref 11.5–15.4)
HGB BLD-MCNC: 13.2 G/DL (ref 11.5–15.4)
MAGNESIUM SERPL-MCNC: 4 MG/DL (ref 1.6–2.6)
MAGNESIUM SERPL-MCNC: 4.5 MG/DL (ref 1.6–2.6)
MAGNESIUM SERPL-MCNC: 4.6 MG/DL (ref 1.6–2.6)
MAGNESIUM SERPL-MCNC: 4.7 MG/DL (ref 1.6–2.6)
MCH RBC QN AUTO: 29.6 PG (ref 26.8–34.3)
MCH RBC QN AUTO: 29.6 PG (ref 26.8–34.3)
MCH RBC QN AUTO: 29.9 PG (ref 26.8–34.3)
MCH RBC QN AUTO: 30.4 PG (ref 26.8–34.3)
MCHC RBC AUTO-ENTMCNC: 32.5 G/DL (ref 31.4–37.4)
MCHC RBC AUTO-ENTMCNC: 32.6 G/DL (ref 31.4–37.4)
MCHC RBC AUTO-ENTMCNC: 33 G/DL (ref 31.4–37.4)
MCHC RBC AUTO-ENTMCNC: 33.8 G/DL (ref 31.4–37.4)
MCV RBC AUTO: 90 FL (ref 82–98)
MCV RBC AUTO: 91 FL (ref 82–98)
O2 CT VFR BLDCOA CALC: 8.2 ML/DL
OXYHGB MFR BLDCOA: 35.3 %
OXYHGB MFR BLDCOV: 92.1 %
PCO2 BLDCOA: 52.1 MM[HG] (ref 30–60)
PCO2 BLDCOV: 24.2 MM HG (ref 27–43)
PH BLDCOA: 7.21 [PH] (ref 7.23–7.43)
PH BLDCOV: 7.42 [PH] (ref 7.19–7.49)
PLATELET # BLD AUTO: 274 THOUSANDS/UL (ref 149–390)
PLATELET # BLD AUTO: 276 THOUSANDS/UL (ref 149–390)
PLATELET # BLD AUTO: 289 THOUSANDS/UL (ref 149–390)
PLATELET # BLD AUTO: 290 THOUSANDS/UL (ref 149–390)
PMV BLD AUTO: 10.1 FL (ref 8.9–12.7)
PMV BLD AUTO: 10.2 FL (ref 8.9–12.7)
PMV BLD AUTO: 10.2 FL (ref 8.9–12.7)
PMV BLD AUTO: 10.3 FL (ref 8.9–12.7)
PO2 BLDCOA: 19 MM HG (ref 5–25)
PO2 BLDCOV: 49.5 MM HG (ref 15–45)
POTASSIUM SERPL-SCNC: 3.4 MMOL/L (ref 3.5–5.3)
POTASSIUM SERPL-SCNC: 3.7 MMOL/L (ref 3.5–5.3)
POTASSIUM SERPL-SCNC: 3.8 MMOL/L (ref 3.5–5.3)
POTASSIUM SERPL-SCNC: 3.8 MMOL/L (ref 3.5–5.3)
PROT SERPL-MCNC: 6.5 G/DL (ref 6.4–8.4)
PROT SERPL-MCNC: 6.8 G/DL (ref 6.4–8.4)
PROT SERPL-MCNC: 8.2 G/DL (ref 6.4–8.4)
PROT SERPL-MCNC: 8.5 G/DL (ref 6.4–8.4)
RBC # BLD AUTO: 3.41 MILLION/UL (ref 3.81–5.12)
RBC # BLD AUTO: 3.81 MILLION/UL (ref 3.81–5.12)
RBC # BLD AUTO: 4.32 MILLION/UL (ref 3.81–5.12)
RBC # BLD AUTO: 4.42 MILLION/UL (ref 3.81–5.12)
RPR SER QL: NORMAL
SAO2 % BLDCOV: 20.4 ML/DL
SODIUM SERPL-SCNC: 134 MMOL/L (ref 135–147)
SODIUM SERPL-SCNC: 137 MMOL/L (ref 135–147)
WBC # BLD AUTO: 17.01 THOUSAND/UL (ref 4.31–10.16)
WBC # BLD AUTO: 18.67 THOUSAND/UL (ref 4.31–10.16)
WBC # BLD AUTO: 20.48 THOUSAND/UL (ref 4.31–10.16)
WBC # BLD AUTO: 21.02 THOUSAND/UL (ref 4.31–10.16)

## 2022-09-28 PROCEDURE — 0HQ9XZZ REPAIR PERINEUM SKIN, EXTERNAL APPROACH: ICD-10-PCS | Performed by: OBSTETRICS & GYNECOLOGY

## 2022-09-28 PROCEDURE — 85027 COMPLETE CBC AUTOMATED: CPT | Performed by: OBSTETRICS & GYNECOLOGY

## 2022-09-28 PROCEDURE — 80053 COMPREHEN METABOLIC PANEL: CPT | Performed by: OBSTETRICS & GYNECOLOGY

## 2022-09-28 PROCEDURE — 82948 REAGENT STRIP/BLOOD GLUCOSE: CPT

## 2022-09-28 PROCEDURE — 80053 COMPREHEN METABOLIC PANEL: CPT

## 2022-09-28 PROCEDURE — 83735 ASSAY OF MAGNESIUM: CPT

## 2022-09-28 PROCEDURE — 10H07YZ INSERTION OF OTHER DEVICE INTO PRODUCTS OF CONCEPTION, VIA NATURAL OR ARTIFICIAL OPENING: ICD-10-PCS | Performed by: OBSTETRICS & GYNECOLOGY

## 2022-09-28 PROCEDURE — 10H073Z INSERTION OF MONITORING ELECTRODE INTO PRODUCTS OF CONCEPTION, VIA NATURAL OR ARTIFICIAL OPENING: ICD-10-PCS | Performed by: OBSTETRICS & GYNECOLOGY

## 2022-09-28 PROCEDURE — 88307 TISSUE EXAM BY PATHOLOGIST: CPT | Performed by: PATHOLOGY

## 2022-09-28 PROCEDURE — 85027 COMPLETE CBC AUTOMATED: CPT

## 2022-09-28 PROCEDURE — 4A1H74Z MONITORING OF PRODUCTS OF CONCEPTION, CARDIAC ELECTRICAL ACTIVITY, VIA NATURAL OR ARTIFICIAL OPENING: ICD-10-PCS | Performed by: OBSTETRICS & GYNECOLOGY

## 2022-09-28 PROCEDURE — 59400 OBSTETRICAL CARE: CPT | Performed by: OBSTETRICS & GYNECOLOGY

## 2022-09-28 PROCEDURE — 82805 BLOOD GASES W/O2 SATURATION: CPT | Performed by: OBSTETRICS & GYNECOLOGY

## 2022-09-28 PROCEDURE — NC001 PR NO CHARGE: Performed by: SURGERY

## 2022-09-28 PROCEDURE — 83735 ASSAY OF MAGNESIUM: CPT | Performed by: OBSTETRICS & GYNECOLOGY

## 2022-09-28 RX ORDER — ENOXAPARIN SODIUM 100 MG/ML
40 INJECTION SUBCUTANEOUS
Status: DISCONTINUED | OUTPATIENT
Start: 2022-09-29 | End: 2022-09-30 | Stop reason: HOSPADM

## 2022-09-28 RX ORDER — CALCIUM CARBONATE 200(500)MG
1000 TABLET,CHEWABLE ORAL 2 TIMES DAILY PRN
Status: DISCONTINUED | OUTPATIENT
Start: 2022-09-28 | End: 2022-09-30 | Stop reason: HOSPADM

## 2022-09-28 RX ORDER — OXYTOCIN/RINGER'S LACTATE 30/500 ML
62.5 PLASTIC BAG, INJECTION (ML) INTRAVENOUS CONTINUOUS
Status: ACTIVE | OUTPATIENT
Start: 2022-09-28 | End: 2022-09-28

## 2022-09-28 RX ORDER — OXYCODONE HYDROCHLORIDE 5 MG/1
5 TABLET ORAL EVERY 6 HOURS PRN
Status: DISCONTINUED | OUTPATIENT
Start: 2022-09-28 | End: 2022-09-30 | Stop reason: HOSPADM

## 2022-09-28 RX ORDER — IBUPROFEN 600 MG/1
600 TABLET ORAL EVERY 6 HOURS PRN
Status: DISCONTINUED | OUTPATIENT
Start: 2022-09-28 | End: 2022-09-30 | Stop reason: HOSPADM

## 2022-09-28 RX ORDER — ACETAMINOPHEN 325 MG/1
650 TABLET ORAL EVERY 6 HOURS PRN
Status: DISCONTINUED | OUTPATIENT
Start: 2022-09-28 | End: 2022-09-30 | Stop reason: HOSPADM

## 2022-09-28 RX ORDER — ROPIVACAINE HYDROCHLORIDE 2 MG/ML
INJECTION, SOLUTION EPIDURAL; INFILTRATION; PERINEURAL
Status: COMPLETED | OUTPATIENT
Start: 2022-09-28 | End: 2022-09-28

## 2022-09-28 RX ORDER — OXYTOCIN/RINGER'S LACTATE 30/500 ML
250 PLASTIC BAG, INJECTION (ML) INTRAVENOUS ONCE
Status: DISCONTINUED | OUTPATIENT
Start: 2022-09-28 | End: 2022-09-30 | Stop reason: HOSPADM

## 2022-09-28 RX ORDER — DIPHENHYDRAMINE HYDROCHLORIDE 50 MG/ML
25 INJECTION INTRAMUSCULAR; INTRAVENOUS EVERY 6 HOURS PRN
Status: DISCONTINUED | OUTPATIENT
Start: 2022-09-28 | End: 2022-09-30 | Stop reason: HOSPADM

## 2022-09-28 RX ORDER — DOCUSATE SODIUM 100 MG/1
100 CAPSULE, LIQUID FILLED ORAL 2 TIMES DAILY
Status: DISCONTINUED | OUTPATIENT
Start: 2022-09-28 | End: 2022-09-30 | Stop reason: HOSPADM

## 2022-09-28 RX ORDER — SIMETHICONE 80 MG
80 TABLET,CHEWABLE ORAL 4 TIMES DAILY PRN
Status: DISCONTINUED | OUTPATIENT
Start: 2022-09-28 | End: 2022-09-30 | Stop reason: HOSPADM

## 2022-09-28 RX ORDER — DIAPER,BRIEF,INFANT-TODD,DISP
1 EACH MISCELLANEOUS DAILY PRN
Status: DISCONTINUED | OUTPATIENT
Start: 2022-09-28 | End: 2022-09-30 | Stop reason: HOSPADM

## 2022-09-28 RX ORDER — ONDANSETRON 2 MG/ML
4 INJECTION INTRAMUSCULAR; INTRAVENOUS EVERY 8 HOURS PRN
Status: DISCONTINUED | OUTPATIENT
Start: 2022-09-28 | End: 2022-09-30 | Stop reason: HOSPADM

## 2022-09-28 RX ORDER — DIPHENHYDRAMINE HYDROCHLORIDE 50 MG/ML
12.5 INJECTION INTRAMUSCULAR; INTRAVENOUS EVERY 6 HOURS PRN
Status: DISCONTINUED | OUTPATIENT
Start: 2022-09-28 | End: 2022-09-28

## 2022-09-28 RX ADMIN — TRANEXAMIC ACID 1000 MG: 1 INJECTION, SOLUTION INTRAVENOUS at 09:45

## 2022-09-28 RX ADMIN — ROPIVACAINE HYDROCHLORIDE 10 ML: 2 INJECTION, SOLUTION EPIDURAL; INFILTRATION; PERINEURAL at 02:15

## 2022-09-28 RX ADMIN — SODIUM CHLORIDE 50 ML/HR: 0.9 INJECTION, SOLUTION INTRAVENOUS at 00:50

## 2022-09-28 RX ADMIN — BENZOCAINE AND LEVOMENTHOL 1 APPLICATION: 200; 5 SPRAY TOPICAL at 11:10

## 2022-09-28 RX ADMIN — IBUPROFEN 600 MG: 600 TABLET ORAL at 21:24

## 2022-09-28 RX ADMIN — ROPIVACAINE HYDROCHLORIDE: 2 INJECTION, SOLUTION EPIDURAL; INFILTRATION at 02:20

## 2022-09-28 RX ADMIN — Medication 62.5 MILLI-UNITS/MIN: at 09:46

## 2022-09-28 RX ADMIN — MAGNESIUM SULFATE HEPTAHYDRATE 2 G/HR: 40 INJECTION, SOLUTION INTRAVENOUS at 06:10

## 2022-09-28 RX ADMIN — MAGNESIUM SULFATE HEPTAHYDRATE 2 G/HR: 40 INJECTION, SOLUTION INTRAVENOUS at 16:53

## 2022-09-28 RX ADMIN — IBUPROFEN 600 MG: 600 TABLET ORAL at 14:39

## 2022-09-28 RX ADMIN — DOCUSATE SODIUM 100 MG: 100 CAPSULE, LIQUID FILLED ORAL at 17:53

## 2022-09-28 RX ADMIN — DOCUSATE SODIUM 100 MG: 100 CAPSULE, LIQUID FILLED ORAL at 09:46

## 2022-09-28 RX ADMIN — ROPIVACAINE HYDROCHLORIDE 10 ML/HR: 2 INJECTION, SOLUTION EPIDURAL; INFILTRATION; PERINEURAL at 02:44

## 2022-09-28 NOTE — PROGRESS NOTES
Progress Note - General Surgery  : [facility] Surgery Resident on [facility]    Zee Candelario 33 y o  female MRN: [id number]  Unit/Bed#: L&D 326-01 Encounter: [id number]      Assessment:  33 y o  female with pre-eclampsia s/p R eyebrow laceration repair yesterday 9/27 at bedside       Plan:  OK to leave open to air  Outpatient follow up approximately 7d after 9/27 suture repair        Subjective: No acute complaints      Objective:     Physical Exam:  GEN: NAD   Ab: Soft, NT/ND  Lung: Normal effort   CV: RRR   Extrem: No CCE   Neuro: A+Ox3     R eyebrow suture line CDI      I/O       09/26 0701  09/27 0700 09/27 0701 09/28 0700    I V  (mL/kg)  606 3 (4 8)    IV Piggyback  100    Total Intake(mL/kg)  706 3 (5 6)    Net  +706 3          Unmeasured Urine Occurrence  1 x          Lab, Imaging and other studies: I have personally reviewed pertinent reports    , CBC with diff:   Lab Results   Component Value Date    WBC 13 73 (H) 09/27/2022    HGB 12 6 09/27/2022    HCT 38 3 09/27/2022    MCV 91 09/27/2022     09/27/2022    MCH 30 1 09/27/2022    MCHC 32 9 09/27/2022    RDW 13 8 09/27/2022    MPV 10 3 09/27/2022   , BMP/CMP:   Lab Results   Component Value Date    SODIUM 140 09/27/2022    K 3 7 09/27/2022     09/27/2022    CO2 23 09/27/2022    BUN 12 09/27/2022    CREATININE 0 68 09/27/2022    CALCIUM 9 7 09/27/2022    AST 26 09/27/2022    ALT 57 09/27/2022    ALKPHOS 162 (H) 09/27/2022    EGFR 116 09/27/2022           Lida Wagner MD  9/27/2022 9:29 PM

## 2022-09-28 NOTE — L&D DELIVERY NOTE
Delivery Summary - OB/GYN   Montana Mcgovern 33 y o  female MRN: [id number]  Unit/Bed#: L&D 326-01 Encounter: [id number]    Pre-delivery Diagnosis:   1  38w0d pregnancy  2  CHTN with superimposed preeclampsia with severe features  3  A1GDM  4  Obesity  5  Forehead laceration    Post-delivery Diagnosis: same, delivered    Attending: Ricardo Hough    Assistant(s): n/a    Procedure:     Anesthesia: epidural    Quantitated Blood Loss:  625 mL    Specimens:   1  Arterial and venous cord gases  2  Cord blood  3  Segment of umbilical cord  4  Placenta to pathology     Complications:  None apparent    Findings:  1  Viable male  delivered on 22 at 0747 weighing 7lbs 6 5oz;  Apgar scores of 8 at one minute and 9 at five minutes  Recent Results (from the past 6 hour(s))   CORD, Blood gas, arterial    Collection Time: 22  7:49 AM   Result Value Ref Range    pH, Cord Art 7 210 (L) 7 230 - 7 430    pCO2, Cord Art 52 1 30 0 - 60 0    pO2, Cord Art 19 0 5 0 - 25 0 mm HG    HCO3, Cord Art 20 4 17 3 - 27 3 mmol/L    Base Exc, Cord Art -8 0 (L) 3 0 - 11 0 mmol/L    O2 Content, Cord Art 8 2 ml/dl    O2 Hgb, Arterial Cord 35 3 %   CORD, Blood gas, venous    Collection Time: 22  7:49 AM   Result Value Ref Range    pH, Cord Shaun 7 425 7 190 - 7 490    pCO2, Cord Shaun 24 2 (L) 27 0 - 43 0 mm HG    pO2, Cord Shaun 49 5 (H) 15 0 - 45 0 mm HG    HCO3, Cord Shaun 15 5 12 2 - 28 6 mmol/L    Base Exc, Cord Shaun -6 6 (L) 1 0 - 9 0 mmol/L    O2 Cont, Cord Shaun 20 4 mL/dL    O2 HGB,VENOUS CORD 92 1 %   2  Spontaneous delivery of placenta with centrally inserted 3-vessel cord  3  Nuchal cord x1, loose and easily reduced at perineum  4  1st degree perineal laceration, repaired with 3-0 Vicryl rapide       Disposition: Patient tolerated the procedure well and was recovering in labor and delivery room with family and  before being transferred to the post-partum floor                   Procedure Details     Description of procedure    After pushing for 19 minutes, on 22 at 0747 patient delivered a viable male , weighing 7lbs 6 5oz, Apgars of 8 (1 min) and 9 (5 min)  The fetal vertex delivered spontaneously  There was a loose nuchal cord that was easily reduced at the perineum  The anterior shoulder delivered atraumatically with maternal expulsive forces and the assistance of gentle downward traction  The posterior shoulder delivered with maternal expulsive forces and the assistance of gentle upward traction  The remainder of the fetus delivered spontaneously  Upon delivery, the infant was placed on the mothers abdomen and the cord was clamped and cut  The infant was noted to cry spontaneously and was moving all extremities appropriately  There was no evidence for injury  Awaiting nurse resuscitators evaluated the  at bedside  Arterial and venous cord blood gases and cord blood was collected for analysis  These were promptly sent to the lab  In the immediate post-partum, 30 units of IV pitocin was administered and the uterus was noted to contract down well with massage and pitocin  The placenta delivered spontaneously at 0750 and was noted to have a centrally inserted 3 vessel cord  The vagina, cervix, and perineum were inspected and there was noted to be a small 1st degree perineal laceration requiring repair  Laceration Repair  Patient was comfortable with epidural at that time  Perineal laceration was repaired in running, locked fashion with 3-0 Vicryl rapide  Good hemostasis was confirmed at the conclusion of this procedure  At the conclusion of the delivery, all needle, sponge, and instrument counts were noted to be correct  Patient tolerated the procedure well and was allowed to recover in labor and delivery room with family and  before being transferred to the post-partum floor  I was present and participated in the entire delivery

## 2022-09-28 NOTE — LACTATION NOTE
This note was copied from a baby's chart  CONSULT - LACTATION  Baby Boy (Zee) Brisan 0 days male MRN: [id number]    [address] [facility] NURSERY Room / Bed: L&D 308(N)/L&D 308(N) Encounter: [id number]    Maternal Information     MOTHER:  Royal Taylor  Maternal Age: 33 y o    OB History: # 1 - Date: 22, Sex: Male, Weight: 3360 g (7 lb 6 5 oz), GA: 38w0d, Delivery: Vaginal, Spontaneous, Apgar1: 8, Apgar5: 9, Living: Living, Birth Comments: None   Previouse breast reduction surgery? No    Lactation history:   Has patient previously breast fed: No   How long had patient previously breast fed:     Previous breast feeding complications:       Past Surgical History:   Procedure Laterality Date    CHOLECYSTECTOMY      KNEE SURGERY Right     AR LAP,CHOLECYSTECTOMY N/A 12/15/2021    Procedure: CHOLECYSTECTOMY LAPAROSCOPIC W/ ROBOTICS;  Surgeon: Caitlin Tipton MD;  Location: [facility];  Service: General    WISDOM TOOTH EXTRACTION          Birth information:  YOB: 2021   Time of birth: 7:47 AM   Sex: male   Delivery type: Vaginal, Spontaneous   Birth Weight: 3360 g (7 lb 6 5 oz)   Percent of Weight Change: 0%     Gestational Age: 38w0d   [unfilled]    Assessment     Breast and nipple assessment: normal assessment     Assessment: normal assessment    Feeding assessment: feeding well    Feeding recommendations:  breast feed on demand    Reviewed RSB  D/C booklet at bedside  Kameron Fermin is on glucometer protocol and sleepy at time of assessment  HE was demonstrated via verbal guidance and prompts: effective  Family took PNBFC  Glucometer readings have been within normal limits up until this time  She's had two feedings the first one she reports was a deep latch, the second one she reports as a shallow latch       Information on hand expression given   Discussed benefits of knowing how to manually express breast including stimulating milk supply, softening nipple for latch and evacuating breast in the event of engorgement  Met with mother  Provided mother with Ready, Set, Baby booklet which contained information on:  Hand expression with access to QR codes to review hand expression  Positioning and latch reviewed as well as showing images of other feeding positions  Discussed the properties of a good latch in any position  Feeding on cue and what that means for recognizing infant's hunger, s/s that baby is getting enough milk and some s/s that breastfeeding dyad may need further help  Skin to Skin contact an benefits to mom and baby  Avoidance of pacifiers for the first month discussed  Gave information on common concerns, what to expect the first few weeks after delivery, preparing for other caregivers, and how partners can help  Resources for support also provided  Encouraged parents to call for assistance, questions, and concerns about breastfeeding  Extension provided          Sheron Hinojosa 9/28/2022 5:43 PM

## 2022-09-28 NOTE — ANESTHESIA PROCEDURE NOTES
Epidural Block    Patient location during procedure: OB  Start time: 9/28/2022 2:15 AM  Reason for block: at surgeon's request  Staffing  Performed: Anesthesiologist   Anesthesiologist: Tona Blackwood DO  Preanesthetic Checklist  Completed: patient identified, IV checked, site marked, risks and benefits discussed, surgical consent, monitors and equipment checked, pre-op evaluation and timeout performed  Epidural  Patient position: sitting  Prep: Betadine  Patient monitoring: heart rate and frequent blood pressure checks  Approach: midline  Location: lumbar  Injection technique: FREDY air  Needle  Needle type: Tuohy   Needle gauge: 18 G  Catheter type: side hole  Catheter size: 20 G  Catheter securement method: surgical taperopivacaine (NAROPIN) 0 2% - Epidural   10 mL - 9/28/2022 2:15:00 AM  Assessment  Sensory level: T10  Number of attempts: 1negative aspiration for CSF, negative aspiration for heme and no paresthesia on injection  patient tolerated the procedure well with no immediate complications

## 2022-09-28 NOTE — OB LABOR/OXYTOCIN SAFETY PROGRESS
Oxytocin Safety Progress Check Note - Rima Melchor 33 y o  female MRN: [id number]    Unit/Bed#: L&D 326-01 Encounter: [id number]    Dose (bruce-units/min) Oxytocin: 4 bruce-units/min (per Dr Tom Goss)  Contraction Frequency (minutes): 2-3  Contraction Quality: Moderate  Tachysystole: No   Cervical Dilation: 7        Cervical Effacement: 80  Fetal Station: -1  Baseline Rate: 120 bpm  Fetal Heart Rate: 120 BPM  FHR Category: Category II               Vital Signs:   Vitals:    09/28/22 0503   BP: 121/57   Pulse: 97   Resp:    Temp:    SpO2:        Notes/comments:   Patient resting comfortably  SVE 7/80/-1  FHT category 2 with intermittent variable decelerations resolving with maternal repositioning  FSE placed due to intermittent drop out, mostly during contractions  Patient making cervical change despite low pitocin, and so will titrate pending fetal tolerance  Dr Guicho Smith aware          Keegan Elkins MD 9/28/2022 5:16 AM

## 2022-09-28 NOTE — OB LABOR/OXYTOCIN SAFETY PROGRESS
Oxytocin Safety Progress Check Note - Rick Fishman 33 y o  female MRN: [id number]    Unit/Bed#: L&D 326-01 Encounter: [id number]    Dose (bruce-units/min) Oxytocin: 8 bruce-units/min  Contraction Frequency (minutes): 5-6  Contraction Quality: Moderate  Tachysystole: No   Cervical Dilation: 5        Cervical Effacement: 80  Fetal Station: -1  Baseline Rate: 125 bpm  Fetal Heart Rate: 125 BPM  FHR Category: Category II               Vital Signs:   Vitals:    09/28/22 0347   BP: 152/85   Pulse: 98   Resp:    Temp:    SpO2:        Notes/comments:   Late deceleration noted with first contractions with IUPC, contractions thus far every 1-2 min, consistent with tachysystole  Pit decreased from 8 to 4  Will continue to monitor closely and adjust pitocin accordingly          Nati Turner MD 9/28/2022 4:05 AM

## 2022-09-28 NOTE — OB LABOR/OXYTOCIN SAFETY PROGRESS
Oxytocin Safety Progress Check Note - Montana Mcgovern 33 y o  female MRN: [id number]    Unit/Bed#: L&D 326-01 Encounter: [id number]    Dose (bruce-units/min) Oxytocin: 8 bruce-units/min  Contraction Frequency (minutes): 1-5  Contraction Quality: Moderate  Tachysystole: No   Cervical Dilation: 4        Cervical Effacement: 70  Fetal Station: -2  Baseline Rate: 140 bpm  Fetal Heart Rate: 135 BPM  FHR Category: Category I               Vital Signs:   Vitals:    09/28/22 0317   BP: 142/88   Pulse: (!) 108   Resp:    Temp:    SpO2:        Notes/comments:   SVE now 4/70/-2  FHT category 1 with contractions every 5-6 min  Continue titrating pitocin to contractions  Patient now requesting epidural   Will plan for AROM at next check  Dr Addison Simpson aware          Ravin Rubi MD 9/28/2022 1:45 AM

## 2022-09-28 NOTE — OB LABOR/OXYTOCIN SAFETY PROGRESS
Labor Progress Note - Brenda Gaytan 33 y o  female MRN: [id number]    Unit/Bed#: L&D 326-01 Encounter: [id number]       Contraction Frequency (minutes): 2-4  Contraction Quality: Mild  Tachysystole: No   Cervical Dilation: 4        Cervical Effacement: 60  Fetal Station: -3  Baseline Rate: 130 bpm  Fetal Heart Rate: 130 BPM  FHR Category: Category I               Vital Signs:   Vitals:    09/27/22 2100   BP: 139/81   Pulse: 89   Resp:    Temp:    SpO2:        Notes/comments:   Chavez balloon pulled and noted to be dislodged in vagina, easily removed  SVE now 4/60/-3, posterior but soft  FHT category 1 with contractions previously every 2-4 min though currently not well traced on her side  Will start pitocin titration now and attempt using Trinity to trace FHR and contractions  Patient remains asymptomatic with minimal discomfort  BPs wnl currently with no SRBP since initiating magnesium  Dr Laura Pickering aware          Kym Aguirre MD 9/27/2022 9:13 PM

## 2022-09-28 NOTE — PLAN OF CARE
Problem: Potential for Falls  Goal: Patient will remain free of falls  Description: INTERVENTIONS:  - Educate patient/family on patient safety including physical limitations  - Instruct patient to call for assistance with activity   - Consult OT/PT to assist with strengthening/mobility   - Keep Call bell within reach  - Keep bed low and locked with side rails adjusted as appropriate  - Keep care items and personal belongings within reach  - Initiate and maintain comfort rounds  - Make Fall Risk Sign visible to staff  - Apply yellow socks and bracelet for high fall risk patients  - Consider moving patient to room near nurses station  Outcome: Progressing     Problem: ANTEPARTUM  Goal: Maintain pregnancy as long as maternal and/or fetal condition is stable  Description: INTERVENTIONS:  - Maternal surveillance  - Fetal surveillance  - Monitor uterine activity  - Medications as ordered  - Bedrest  Outcome: Completed     Problem: BIRTH - VAGINAL/ SECTION  Goal: Fetal and maternal status remain reassuring during the birth process  Description: INTERVENTIONS:  - Monitor vital signs  - Monitor fetal heart rate  - Monitor uterine activity  - Monitor labor progression (vaginal delivery)  - DVT prophylaxis  - Antibiotic prophylaxis  Outcome: Completed  Goal: Emotionally satisfying birthing experience for mother/fetus  Description: Interventions:  - Assess, plan, implement and evaluate the nursing care given to the patient in labor  - Advocate the philosophy that each childbirth experience is a unique experience and support the family's chosen level of involvement and control during the labor process   - Actively participate in both the patient's and family's teaching of the birth process  - Consider cultural, Sikh and age-specific factors and plan care for the patient in labor  Outcome: Completed

## 2022-09-28 NOTE — OB LABOR/OXYTOCIN SAFETY PROGRESS
Oxytocin Safety Progress Check Note - Keanu Barakat 33 y o  female MRN: [id number]    Unit/Bed#: L&D 326-01 Encounter: [id number]    Dose (bruce-units/min) Oxytocin: 6 bruce-units/min  Contraction Frequency (minutes): 1-2 5  Contraction Quality: Mild  Tachysystole: No   Cervical Dilation: 4        Cervical Effacement: 60  Fetal Station: -3  Baseline Rate: 130 bpm  Fetal Heart Rate: 135 BPM  FHR Category: Category I               Vital Signs:   Vitals:    09/28/22 0000   BP: 153/85   Pulse: 92   Resp:    Temp:    SpO2:        Notes/comments:   Patient resting with minimal discomfort  FHT category 1 with contractions every 2-4 min  Pit running at 6, continue titrating to contractions  Will defer SVE at this time and recheck in 2 hr or sooner if clinically indicated          Ariadna Jerez MD 9/27/2022 11:30 AM

## 2022-09-28 NOTE — ANESTHESIA PREPROCEDURE EVALUATION
Procedure:  LABOR ANALGESIA    Relevant Problems   CARDIO   (+) Chronic hypertension affecting pregnancy      GYN   (+) 37 weeks gestation of pregnancy      NEURO/PSYCH   (+) History of COVID-19        Physical Exam    Airway    Mallampati score: II  TM Distance: >3 FB  Neck ROM: full     Dental       Cardiovascular  Rhythm: regular, Rate: normal,     Pulmonary  Breath sounds clear to auscultation,     Other Findings        Anesthesia Plan  ASA Score- 2     Anesthesia Type- epidural with ASA Monitors  Additional Monitors:   Airway Plan:           Plan Factors-Exercise tolerance (METS): >4 METS  Chart reviewed  Existing labs reviewed  Patient summary reviewed  Patient is not a current smoker  Patient not instructed to abstain from smoking on day of procedure  Patient did not smoke on day of surgery  Obstructive sleep apnea risk education given perioperatively  Induction- intravenous  Postoperative Plan-     Informed Consent- Anesthetic plan and risks discussed with patient

## 2022-09-28 NOTE — ANESTHESIA POSTPROCEDURE EVALUATION
Post-Op Assessment Note    CV Status:  Stable    Pain management: adequate     Mental Status:  Alert and awake   Hydration Status:  Euvolemic   PONV Controlled:  Controlled   Airway Patency:  Patent      Post Op Vitals Reviewed: Yes      Staff: Anesthesiologist     Post-op block assessment: catheter intact and no complications      No complications documented      BP      Temp      Pulse    Resp      SpO2      /87 (BP Location: Left arm)   Pulse (!) 107   Temp 98 1 °F (36 7 °C) (Oral)   Resp 16   Ht 5' 7" (1 702 m)   Wt 126 kg (277 lb)   LMP 01/05/2022   SpO2 98%   BMI 43 38 kg/m²

## 2022-09-28 NOTE — UTILIZATION REVIEW
Inpatient Admission Authorization Request   Notification of Maternity/Delivery &  Birth Information for Admission   SERVICING FACILITY:   [address] [place] [address], [place] [address] [facility]  Tax ID: [id number]  NPI: [id number]  Place of Service: Inpatient [address] [facility]  [address]  Place of Service Code: 24     ATTENDING PROVIDER:  Attending Name and NPI#: Savi Mendoza Md [[id number]]  Address: [address] [place] [address], [place] [address] [address] [facility]  Phone: [phone]     UTILIZATION REVIEW CONTACT:  Judith Rowland Utilization   Network Utilization Review Department  Phone: [phone]  Fax [phone]  Email: [email or web address]     PHYSICIAN ADVISORY SERVICES:  FOR [id number] REVIEW - MEDICAL NECESSITY DENIAL  Phone: [phone]  Fax: [phone]  Email: [email or web address]     TYPE OF REQUEST:  Inpatient Status     ADMISSION INFORMATION:  ADMISSION DATE/TIME: 22  1:15 PM  PATIENT DIAGNOSIS CODE/DESCRIPTION:  Abdominal pain during pregnancy [O26 899, R10 9] The primary encounter diagnosis was Eyebrow laceration, right, initial encounter  A diagnosis of 37 weeks gestation of pregnancy was also pertinent to this visit  1  Eyebrow laceration, right, initial encounter    2  37 weeks gestation of pregnancy      DISCHARGE DATE/TIME: No discharge date for patient encounter     MOTHER AND  INFORMATION:  Mother: Bernard Diego 1991   Delivering clinician: [facility]   OB History        1    Para   0    Term   0       0    AB   0    Living   0       SAB   0    IAB   0    Ectopic   0    Multiple   0    Live Births   0               [place] Name & MRN:   Information for the patient's :  Heriberto Robles [[id number]]     [place] Delivery Information:  Sex: male  Delivered 2022 7:47 AM by ; Gestational Age: 42w0d    [place] Measurements:  Weight: 7 lb 6 5 oz (3360 g); Height: 21"    APGAR 1 minute 5 minutes 10 minutes   Totals: 8 9      [place] Birth Information: 33 y o  female MRN: [id number] Unit/Bed#: L&D 326-01 Estimated Date of Delivery: 10/12/22  Birthweight: No birth weight on file  Gestational Age: <None> Delivery Type:       IMPORTANT INFORMATION:  Please contact Claire Joya directly with any questions or concerns regarding this request  Department voicemails are confidential     Send requests for admission clinical reviews, concurrent reviews, approvals, and administrative denials due to lack of clinical to fax [phone]

## 2022-09-28 NOTE — PLAN OF CARE
Problem: Potential for Falls  Goal: Patient will remain free of falls  Description: INTERVENTIONS:  - Educate patient/family on patient safety including physical limitations  - Instruct patient to call for assistance with activity   - Consult OT/PT to assist with strengthening/mobility   - Keep Call bell within reach  - Keep bed low and locked with side rails adjusted as appropriate  - Keep care items and personal belongings within reach  - Initiate and maintain comfort rounds  - Make Fall Risk Sign visible to staff  - Apply yellow socks and bracelet for high fall risk patients  - Consider moving patient to room near nurses station  Outcome: Progressing     Problem: ANTEPARTUM  Goal: Maintain pregnancy as long as maternal and/or fetal condition is stable  Description: INTERVENTIONS:  - Maternal surveillance  - Fetal surveillance  - Monitor uterine activity  - Medications as ordered  - Bedrest  Outcome: Progressing     Problem: BIRTH - VAGINAL/ SECTION  Goal: Fetal and maternal status remain reassuring during the birth process  Description: INTERVENTIONS:  - Monitor vital signs  - Monitor fetal heart rate  - Monitor uterine activity  - Monitor labor progression (vaginal delivery)  - DVT prophylaxis  - Antibiotic prophylaxis  Outcome: Progressing  Goal: Emotionally satisfying birthing experience for mother/fetus  Description: Interventions:  - Assess, plan, implement and evaluate the nursing care given to the patient in labor  - Advocate the philosophy that each childbirth experience is a unique experience and support the family's chosen level of involvement and control during the labor process   - Actively participate in both the patient's and family's teaching of the birth process  - Consider cultural, Roman Catholic and age-specific factors and plan care for the patient in labor  Outcome: Progressing

## 2022-09-28 NOTE — OB LABOR/OXYTOCIN SAFETY PROGRESS
Oxytocin Safety Progress Check Note - Rick Fishman 33 y o  female MRN: [id number]    Unit/Bed#: L&D 326-01 Encounter: [id number]    Dose (bruce-units/min) Oxytocin: 8 bruce-units/min  Contraction Frequency (minutes): 5-6  Contraction Quality: Moderate  Tachysystole: No   Cervical Dilation: 5        Cervical Effacement: 80  Fetal Station: -1  Baseline Rate: 125 bpm  Fetal Heart Rate: 125 BPM  FHR Category: Category II               Vital Signs:   Vitals:    09/28/22 0317   BP: 142/88   Pulse: (!) 108   Resp:    Temp:    SpO2:        Notes/comments:   Patient resting with adequate pain control with epidural   FHT category 2 with intermittent variable decelerations  IVF and maternal repositioning ongoing  Contractions poorly traced with toco, but patient feeling them every 5-6 min  SVE now 5/80/-1 with minimal clear fluid noted, and no membranes palpable, consistent with SROM  IUPC placed to better trace contractions and titrate pitocin  Pitocin running at 8, titrate to contraction frequency and MVU          Nati Turner MD 9/28/2022 4:01 AM

## 2022-09-29 PROBLEM — Z3A.37 37 WEEKS GESTATION OF PREGNANCY: Status: RESOLVED | Noted: 2022-06-27 | Resolved: 2022-09-29

## 2022-09-29 PROCEDURE — 99024 POSTOP FOLLOW-UP VISIT: CPT | Performed by: STUDENT IN AN ORGANIZED HEALTH CARE EDUCATION/TRAINING PROGRAM

## 2022-09-29 RX ADMIN — ACETAMINOPHEN 650 MG: 325 TABLET ORAL at 06:11

## 2022-09-29 RX ADMIN — ENOXAPARIN SODIUM 40 MG: 40 INJECTION SUBCUTANEOUS at 08:34

## 2022-09-29 RX ADMIN — IBUPROFEN 600 MG: 600 TABLET ORAL at 19:16

## 2022-09-29 RX ADMIN — IBUPROFEN 600 MG: 600 TABLET ORAL at 08:34

## 2022-09-29 RX ADMIN — DOCUSATE SODIUM 100 MG: 100 CAPSULE, LIQUID FILLED ORAL at 19:15

## 2022-09-29 RX ADMIN — DOCUSATE SODIUM 100 MG: 100 CAPSULE, LIQUID FILLED ORAL at 08:34

## 2022-09-29 NOTE — PLAN OF CARE
Problem: Potential for Falls  Goal: Patient will remain free of falls  Description: INTERVENTIONS:  - Educate patient/family on patient safety including physical limitations  - Instruct patient to call for assistance with activity   - Consult OT/PT to assist with strengthening/mobility   - Keep Call bell within reach  - Keep bed low and locked with side rails adjusted as appropriate  - Keep care items and personal belongings within reach  - Initiate and maintain comfort rounds  - Make Fall Risk Sign visible to staff  - Apply yellow socks and bracelet for high fall risk patients  - Consider moving patient to room near nurses station  Outcome: Progressing     Problem: PAIN - ADULT  Goal: Verbalizes/displays adequate comfort level or baseline comfort level  Description: Interventions:  - Encourage patient to monitor pain and request assistance  - Assess pain using appropriate pain scale  - Administer analgesics based on type and severity of pain and evaluate response  - Implement non-pharmacological measures as appropriate and evaluate response  - Consider cultural and social influences on pain and pain management  - Notify physician/advanced practitioner if interventions unsuccessful or patient reports new pain  Outcome: Progressing     Problem: INFECTION - ADULT  Goal: Absence or prevention of progression during hospitalization  Description: INTERVENTIONS:  - Assess and monitor for signs and symptoms of infection  - Monitor lab/diagnostic results  - Monitor all insertion sites, i e  indwelling lines, tubes, and drains  - Monitor endotracheal if appropriate and nasal secretions for changes in amount and color  - [place] appropriate cooling/warming therapies per order  - Administer medications as ordered  - Instruct and encourage patient and family to use good hand hygiene technique  - Identify and instruct in appropriate isolation precautions for identified infection/condition  Outcome: Progressing  Goal: Absence of fever/infection during neutropenic period  Description: INTERVENTIONS:  - Monitor WBC    Outcome: Progressing     Problem: SAFETY ADULT  Goal: Patient will remain free of falls  Description: INTERVENTIONS:  - Educate patient/family on patient safety including physical limitations  - Instruct patient to call for assistance with activity   - Consult OT/PT to assist with strengthening/mobility   - Keep Call bell within reach  - Keep bed low and locked with side rails adjusted as appropriate  - Keep care items and personal belongings within reach  - Initiate and maintain comfort rounds  - Make Fall Risk Sign visible to staff  - Apply yellow socks and bracelet for high fall risk patients  - Consider moving patient to room near nurses station  Outcome: Progressing  Goal: Maintain or return to baseline ADL function  Description: INTERVENTIONS:  -  Assess patient's ability to carry out ADLs; assess patient's baseline for ADL function and identify physical deficits which impact ability to perform ADLs (bathing, care of mouth/teeth, toileting, grooming, dressing, etc )  - Assess/evaluate cause of self-care deficits   - Assess range of motion  - Assess patient's mobility; develop plan if impaired  - Assess patient's need for assistive devices and provide as appropriate  - Encourage maximum independence but intervene and supervise when necessary  - Involve family in performance of ADLs  - Assess for home care needs following discharge   - Consider OT consult to assist with ADL evaluation and planning for discharge  - Provide patient education as appropriate  Outcome: Progressing  Goal: Maintains/Returns to pre admission functional level  Description: INTERVENTIONS:  - Perform BMAT or MOVE assessment daily    - Set and communicate daily mobility goal to care team and patient/family/caregiver     - Collaborate with rehabilitation services on mobility goals if consulted  - Out of bed for toileting  - Record patient progress and toleration of activity level   Outcome: Progressing     Problem: Knowledge Deficit  Goal: Patient/family/caregiver demonstrates understanding of disease process, treatment plan, medications, and discharge instructions  Description: Complete learning assessment and assess knowledge base  Interventions:  - Provide teaching at level of understanding  - Provide teaching via preferred learning methods  Outcome: Progressing     Problem: DISCHARGE PLANNING  Goal: Discharge to home or other facility with appropriate resources  Description: INTERVENTIONS:  - Identify barriers to discharge w/patient and caregiver  - Arrange for needed discharge resources and transportation as appropriate  - Identify discharge learning needs (meds, wound care, etc )  - Arrange for interpretive services to assist at discharge as needed  - Refer to Case Management Department for coordinating discharge planning if the patient needs post-hospital services based on physician/advanced practitioner order or complex needs related to functional status, cognitive ability, or social support system  Outcome: Progressing     Problem: PAIN -   Goal: Displays adequate comfort level or baseline comfort level  Description: INTERVENTIONS:  - Perform pain scoring using age-appropriate tool with hands-on care as needed    Notify physician/AP of high pain scores not responsive to comfort measures  - Administer analgesics based on type and severity of pain and evaluate response  - Sucrose analgesia per protocol for brief minor painful procedures  - Teach parents interventions for comforting infant  Outcome: Progressing     Problem: THERMOREGULATION - PEDIATRICS  Goal: Maintains normal body temperature  Description: Interventions:  - Monitor temperature (axillary for Newborns) as ordered  - Monitor for signs of hypothermia or hyperthermia  - Provide thermal support measures  - Wean to open crib when appropriate  Outcome: Progressing     Problem: INFECTION -   Goal: No evidence of infection  Description: INTERVENTIONS:  - Instruct family/visitors to use good hand hygiene technique  - Identify and instruct in appropriate isolation precautions for identified infection/condition  - Change incubator every 2 weeks or as needed  - Monitor for symptoms of infection  - Monitor surgical sites and insertion sites for all indwelling lines, tubes, and drains for drainage, redness, or edema   - Monitor endotracheal and nasal secretions for changes in amount and color  - Monitor culture and CBC results  - Administer antibiotics as ordered  Monitor drug levels  Outcome: Progressing     Problem: RISK FOR INFECTION (RISK FACTORS FOR MATERNAL CHORIOAMNIOITIS - )  Goal: No evidence of infection  Description: INTERVENTIONS:  - Instruct family/visitors to use good hand hygiene technique  - Monitor for symptoms of infection  - Monitor culture and CBC results  - Administer antibiotics as ordered  Monitor drug levels  Outcome: Progressing     Problem: SAFETY -   Goal: Patient will remain free from falls  Description: INTERVENTIONS:  - Instruct family/caregiver on patient safety  - Keep incubator doors and portholes closed when unattended  - Keep radiant warmer side rails and crib rails up when unattended  - Based on caregiver fall risk screen, instruct family/caregiver to ask for assistance with transferring infant if caregiver noted to have fall risk factors  Outcome: Progressing     Problem: Knowledge Deficit  Goal: Patient/family/caregiver demonstrates understanding of disease process, treatment plan, medications, and discharge instructions  Description: Complete learning assessment and assess knowledge base    Interventions:  - Provide teaching at level of understanding  - Provide teaching via preferred learning methods  Outcome: Progressing  Goal: Infant caregiver verbalizes understanding of benefits of skin-to-skin with healthy   Description: Prior to delivery, educate patient regarding skin-to-skin practice and its benefits  Initiate immediate and uninterrupted skin-to-skin contact after birth until breastfeeding is initiated or a minimum of one hour  Encourage continued skin-to-skin contact throughout the post partum stay    Outcome: Progressing  Goal: Infant caregiver verbalizes understanding of benefits and management of breastfeeding their healthy   Description: Help initiate breastfeeding within one hour of birth  Educate/assist with breastfeeding positioning and latch  Educate on safe positioning and to monitor their  for safety  Educate on how to maintain lactation even if they are  from their   Educate/initiate pumping for a mom with a baby in the NICU within 6 hours after birth  Give infants no food or drink other than breast milk unless medically indicated  Educate on feeding cues and encourage breastfeeding on demand    Outcome: Progressing  Goal: Infant caregiver verbalizes understanding of benefits to rooming-in with their healthy   Description: Promote rooming in 23 out of 24 hours per day  Educate on benefits to rooming-in  Provide  care in room with parents as long as infant and mother condition allow    Outcome: Progressing  Goal: Provide formula feeding instructions and preparation information to caregivers who do not wish to breastfeed their   Description: Provide one on one information on frequency, amount, and burping for formula feeding caregivers throughout their stay and at discharge  Provide written information/video on formula preparation  Outcome: Progressing  Goal: Infant caregiver verbalizes understanding of support and resources for follow up after discharge  Description: Provide individual discharge education on when to call the doctor  Provide resources and contact information for post-discharge support      Outcome: Progressing

## 2022-09-29 NOTE — DISCHARGE SUMMARY
Obstetrics Discharge Summary  Shamir Rainey 33 y o  female MRN: [id number]  Unit/Bed#: L&D 836-78 Encounter: [id number]    Admission Date: 2022     Discharge Date: 22    Admitting Attending: Dr Nathaniel Yang  Delivery Attending: Dr Josephine Ríos  Discharging Attending: Dr Marino Rankin    Admitting Diagnoses:   1  38w0d pregnancy  2  CHTN with superimposed preeclampsia with severe features  3  A1GDM  4  Obesity  5  Forehead laceration       Discharge Diagnoses:   Same, delivered      Procedures: spontaneous vaginal delivery    Anesthesia: epidural    Hospital course: Shamir Rainey is now a 33 y o  Darrynseun Aburtots who was initially admitted at 37w6d for IOL for [facility] with super imposed PreE  Of note immediately prior to presentation patient had a fall down the stairs during which she sustained a laceration to the right side of her face  Patient had 4 hours of fetal monitoring prior to the initiation of of her induction  She received a Chavez balloon and vaginal Cytotec for cervical ripening  She was placed on a Pitocin titration  She received an epidural for analgesia  During her labor course she also had several additional severe range blood pressures warranting initiation of magnesium therapy  She had spontaneous rupture membranes for clear fluid  An IUPC was placed to better trace contractions  Patient had intermittent variable decels which resolved with maternal repositioning  She progressed to complete cervical dilation and began pushing  On 22 she delivered a viable male  38w0d via normal spontaneous vaginal delivery  She sustained a 1st degree laceration during delivery  which was adequately repaired  's birth weight was 7 lbs 6 5 oz; Apgars were 8 (1 min) and 9 (5 min)   was admitted to the  nursery  Patient tolerated the procedure well  Her post-delivery course was complicated by cHTN with SI PreE w/SF requiring 12hr of PP magnesium   Her postpartum pain was well controlled with oral analgesics  Maternal blood type is B+ so RhoGAM was not indicated  At 12 hours  postpartum patient was evaluated for discontinuation of magnesium  At this time patient was asymptomatic and normotensive her urine output had been excellent  Therefore magnesium was discontinued at approximately 1:00 a m  on 09/29/2022  On day of discharge, she was ambulating and able to reasonably perform all ADLs  She was voiding and had appropriate bowel function  Pain was well controlled  She was discharged home on postpartum day #2 without complications  Patient was instructed to follow up with her OBGYN as an outpatient and was given appropriate warnings to call provider if she develops signs of infection or uncontrolled pain  Complications: none apparent    Condition at discharge: good     Provisions for Follow-Up Care:  Please see after visit summary for information related to follow-up care and any pertinent home health orders  Disposition: Home    Planned Readmission: No    Discharge Medications:   Please see AVS for a complete list of discharge medications  Discharge instructions :   Please see AVS for complete discharge instructions      Edelmira FOLEY PGY-2  09/30/22 7:05 AM      Cleared for MI home

## 2022-09-29 NOTE — QUICK NOTE
Evaluated patient to consider discontinuing magnesium for seizure prophylaxis  She is asymptomatic, feeling well with adequate pain control and ambulating without difficulty  She denies headaches, vision changes, chest pain, SOB, RUQ/epigastric pain, and increased swelling  Physical exam is notable for trace edema only, normal otherwise (RRR, CTAB, no abdominal tenderness to palpation)  Urine output is excellent  BP normal since 11 am, only intermittently in the 140s/90s prior to then  Will discontinue magnesium at this time  She received >8 hr magnesium prior to delivery  Discussed with Dr Ana Maria Clinton MD  OBGYN Resident  09/29/22  12:50 AM

## 2022-09-29 NOTE — ASSESSMENT & PLAN NOTE
Recovering well   Encourage Ambulation  Encourage breastfeeding  GBS negative   Rh +  Contraception: undecided  Discharge PPD#2 Scheduled/Direct

## 2022-09-29 NOTE — LACTATION NOTE
This note was copied from a baby's chart  CONSULT - LACTATION  Baby Boy (Zee) Shanique 1 days male MRN: [id number]    [address] [facility] NURSERY Room / Bed: L&D 308(N)/L&D 308(N) Encounter: [id number]    Maternal Information     MOTHER:  Ya Allen  Maternal Age: 33 y o    OB History: # 1 - Date: 22, Sex: Male, Weight: 3360 g (7 lb 6 5 oz), GA: 38w0d, Delivery: Vaginal, Spontaneous, Apgar1: 8, Apgar5: 9, Living: Living, Birth Comments: None   Previouse breast reduction surgery? No    Lactation history:   Has patient previously breast fed: No   How long had patient previously breast fed:     Previous breast feeding complications:       Past Surgical History:   Procedure Laterality Date    CHOLECYSTECTOMY      KNEE SURGERY Right     DC LAP,CHOLECYSTECTOMY N/A 12/15/2021    Procedure: CHOLECYSTECTOMY LAPAROSCOPIC W/ ROBOTICS;  Surgeon: Afshin Alva MD;  Location: [facility];  Service: General    WISDOM TOOTH EXTRACTION          Birth information:  YOB: 2021   Time of birth: 7:47 AM   Sex: male   Delivery type: Vaginal, Spontaneous   Birth Weight: 3360 g (7 lb 6 5 oz)   Percent of Weight Change: -4%     Gestational Age: 42w0d   [unfilled]    Assessment     Breast and nipple assessment: normal assessment     Assessment: normal assessment    Feeding assessment: feeding well  LATCH:  Latch: Grasps breast, tongue down, lips flanged, rhythmic sucking   Audible Swallowing: Spontaneous and intermittent (24 hours old)   Type of Nipple: Everted (After stimulation)   Comfort (Breast/Nipple): Soft/non-tender   Hold (Positioning): Partial assist, teach one side, mother does other, staff holds   DEPAU CENTER Score: 9          Feeding recommendations:  breast feed on demand     Both RSB and D/C booklets were reviewed   Emphasized alighnment for this assessment to improve feeling of latch at the breast     Met with mother to go over discharge breastfeeding booklet including the feeding log  Emphasized 8 or more (12) feedings in a 24 hour period, what to expect for the number of diapers per day of life and the progression of properties of the  stooling pattern  Reviewed breastfeeding and your lifestyle, storage and preparation of breast milk, how to keep you breast pump clean, the employed breastfeeding mother and paced bottle feeding handouts  Booklet included Breastfeeding Resources for after discharge including access to the number for the [address]  Discussed s/s engorgement, blocked milk ducts, and mastitis  Discussed how to remedy at home and when to contact physician  Encouraged parents to call for assistance, questions, and concerns about breastfeeding  Extension provided        Kennedy Meeks 2022 6:18 PM

## 2022-09-29 NOTE — PROGRESS NOTES
Discharge teaching performed with patient  Educational packet provided, as well as save your life magnet  Patient verbalized an understanding and was encouraged to continue to ask questions prior to discharge

## 2022-09-29 NOTE — PLAN OF CARE
Problem: Potential for Falls  Goal: Patient will remain free of falls  Description: INTERVENTIONS:  - Educate patient/family on patient safety including physical limitations  - Instruct patient to call for assistance with activity   - Consult OT/PT to assist with strengthening/mobility   - Keep Call bell within reach  - Keep bed low and locked with side rails adjusted as appropriate  - Keep care items and personal belongings within reach  - Initiate and maintain comfort rounds  - Make Fall Risk Sign visible to staff  - Apply yellow socks and bracelet for high fall risk patients  - Consider moving patient to room near nurses station  Outcome: Progressing     Problem: PAIN - ADULT  Goal: Verbalizes/displays adequate comfort level or baseline comfort level  Description: Interventions:  - Encourage patient to monitor pain and request assistance  - Assess pain using appropriate pain scale  - Administer analgesics based on type and severity of pain and evaluate response  - Implement non-pharmacological measures as appropriate and evaluate response  - Consider cultural and social influences on pain and pain management  - Notify physician/advanced practitioner if interventions unsuccessful or patient reports new pain  Outcome: Progressing     Problem: INFECTION - ADULT  Goal: Absence or prevention of progression during hospitalization  Description: INTERVENTIONS:  - Assess and monitor for signs and symptoms of infection  - Monitor lab/diagnostic results  - Monitor all insertion sites, i e  indwelling lines, tubes, and drains  - Monitor endotracheal if appropriate and nasal secretions for changes in amount and color  - [place] appropriate cooling/warming therapies per order  - Administer medications as ordered  - Instruct and encourage patient and family to use good hand hygiene technique  - Identify and instruct in appropriate isolation precautions for identified infection/condition  Outcome: Progressing  Goal: Absence of fever/infection during neutropenic period  Description: INTERVENTIONS:  - Monitor WBC    Outcome: Progressing     Problem: SAFETY ADULT  Goal: Patient will remain free of falls  Description: INTERVENTIONS:  - Educate patient/family on patient safety including physical limitations  - Instruct patient to call for assistance with activity   - Consult OT/PT to assist with strengthening/mobility   - Keep Call bell within reach  - Keep bed low and locked with side rails adjusted as appropriate  - Keep care items and personal belongings within reach  - Initiate and maintain comfort rounds  - Make Fall Risk Sign visible to staff  - Apply yellow socks and bracelet for high fall risk patients  - Consider moving patient to room near nurses station  Outcome: Progressing  Goal: Maintain or return to baseline ADL function  Description: INTERVENTIONS:  -  Assess patient's ability to carry out ADLs; assess patient's baseline for ADL function and identify physical deficits which impact ability to perform ADLs (bathing, care of mouth/teeth, toileting, grooming, dressing, etc )  - Assess/evaluate cause of self-care deficits   - Assess range of motion  - Assess patient's mobility; develop plan if impaired  - Assess patient's need for assistive devices and provide as appropriate  - Encourage maximum independence but intervene and supervise when necessary  - Involve family in performance of ADLs  - Assess for home care needs following discharge   - Consider OT consult to assist with ADL evaluation and planning for discharge  - Provide patient education as appropriate  Outcome: Progressing  Goal: Maintains/Returns to pre admission functional level  Description: INTERVENTIONS:  - Perform BMAT or MOVE assessment daily    - Set and communicate daily mobility goal to care team and patient/family/caregiver     - Collaborate with rehabilitation services on mobility goals if consulted  - Out of bed for toileting  - Record patient progress and toleration of activity level   Outcome: Progressing     Problem: Knowledge Deficit  Goal: Patient/family/caregiver demonstrates understanding of disease process, treatment plan, medications, and discharge instructions  Description: Complete learning assessment and assess knowledge base  Interventions:  - Provide teaching at level of understanding  - Provide teaching via preferred learning methods  Outcome: Progressing     Problem: DISCHARGE PLANNING  Goal: Discharge to home or other facility with appropriate resources  Description: INTERVENTIONS:  - Identify barriers to discharge w/patient and caregiver  - Arrange for needed discharge resources and transportation as appropriate  - Identify discharge learning needs (meds, wound care, etc )  - Arrange for interpretive services to assist at discharge as needed  - Refer to Case Management Department for coordinating discharge planning if the patient needs post-hospital services based on physician/advanced practitioner order or complex needs related to functional status, cognitive ability, or social support system  Outcome: Progressing     Problem: PAIN -   Goal: Displays adequate comfort level or baseline comfort level  Description: INTERVENTIONS:  - Perform pain scoring using age-appropriate tool with hands-on care as needed    Notify physician/AP of high pain scores not responsive to comfort measures  - Administer analgesics based on type and severity of pain and evaluate response  - Sucrose analgesia per protocol for brief minor painful procedures  - Teach parents interventions for comforting infant  Outcome: Progressing     Problem: THERMOREGULATION - PEDIATRICS  Goal: Maintains normal body temperature  Description: Interventions:  - Monitor temperature (axillary for Newborns) as ordered  - Monitor for signs of hypothermia or hyperthermia  - Provide thermal support measures  - Wean to open crib when appropriate  Outcome: Progressing     Problem: INFECTION -   Goal: No evidence of infection  Description: INTERVENTIONS:  - Instruct family/visitors to use good hand hygiene technique  - Identify and instruct in appropriate isolation precautions for identified infection/condition  - Change incubator every 2 weeks or as needed  - Monitor for symptoms of infection  - Monitor surgical sites and insertion sites for all indwelling lines, tubes, and drains for drainage, redness, or edema   - Monitor endotracheal and nasal secretions for changes in amount and color  - Monitor culture and CBC results  - Administer antibiotics as ordered  Monitor drug levels  Outcome: Progressing     Problem: RISK FOR INFECTION (RISK FACTORS FOR MATERNAL CHORIOAMNIOITIS - )  Goal: No evidence of infection  Description: INTERVENTIONS:  - Instruct family/visitors to use good hand hygiene technique  - Monitor for symptoms of infection  - Monitor culture and CBC results  - Administer antibiotics as ordered  Monitor drug levels  Outcome: Progressing     Problem: SAFETY -   Goal: Patient will remain free from falls  Description: INTERVENTIONS:  - Instruct family/caregiver on patient safety  - Keep incubator doors and portholes closed when unattended  - Keep radiant warmer side rails and crib rails up when unattended  - Based on caregiver fall risk screen, instruct family/caregiver to ask for assistance with transferring infant if caregiver noted to have fall risk factors  Outcome: Progressing     Problem: Knowledge Deficit  Goal: Patient/family/caregiver demonstrates understanding of disease process, treatment plan, medications, and discharge instructions  Description: Complete learning assessment and assess knowledge base    Interventions:  - Provide teaching at level of understanding  - Provide teaching via preferred learning methods  Outcome: Progressing  Goal: Infant caregiver verbalizes understanding of benefits of skin-to-skin with healthy   Description: Prior to delivery, educate patient regarding skin-to-skin practice and its benefits  Initiate immediate and uninterrupted skin-to-skin contact after birth until breastfeeding is initiated or a minimum of one hour  Encourage continued skin-to-skin contact throughout the post partum stay    Outcome: Progressing  Goal: Infant caregiver verbalizes understanding of benefits and management of breastfeeding their healthy   Description: Help initiate breastfeeding within one hour of birth  Educate/assist with breastfeeding positioning and latch  Educate on safe positioning and to monitor their  for safety  Educate on how to maintain lactation even if they are  from their   Educate/initiate pumping for a mom with a baby in the NICU within 6 hours after birth  Give infants no food or drink other than breast milk unless medically indicated  Educate on feeding cues and encourage breastfeeding on demand    Outcome: Progressing  Goal: Infant caregiver verbalizes understanding of benefits to rooming-in with their healthy   Description: Promote rooming in 23 out of 24 hours per day  Educate on benefits to rooming-in  Provide  care in room with parents as long as infant and mother condition allow    Outcome: Progressing  Goal: Provide formula feeding instructions and preparation information to caregivers who do not wish to breastfeed their   Description: Provide one on one information on frequency, amount, and burping for formula feeding caregivers throughout their stay and at discharge  Provide written information/video on formula preparation  Outcome: Progressing  Goal: Infant caregiver verbalizes understanding of support and resources for follow up after discharge  Description: Provide individual discharge education on when to call the doctor  Provide resources and contact information for post-discharge support      Outcome: Progressing

## 2022-09-29 NOTE — PLAN OF CARE
Problem: Potential for Falls  Goal: Patient will remain free of falls  Description: INTERVENTIONS:  - Educate patient/family on patient safety including physical limitations  - Instruct patient to call for assistance with activity   - Consult OT/PT to assist with strengthening/mobility   - Keep Call bell within reach  - Keep bed low and locked with side rails adjusted as appropriate  - Keep care items and personal belongings within reach  - Initiate and maintain comfort rounds  - Make Fall Risk Sign visible to staff  - Offer Toileting every  Hours, in advance of need  - Initiate/Maintain alarm  - Obtain necessary fall risk management equipment:   - Apply yellow socks and bracelet for high fall risk patients  - Consider moving patient to room near nurses station  Outcome: Progressing     Problem: PAIN - ADULT  Goal: Verbalizes/displays adequate comfort level or baseline comfort level  Description: Interventions:  - Encourage patient to monitor pain and request assistance  - Assess pain using appropriate pain scale  - Administer analgesics based on type and severity of pain and evaluate response  - Implement non-pharmacological measures as appropriate and evaluate response  - Consider cultural and social influences on pain and pain management  - Notify physician/advanced practitioner if interventions unsuccessful or patient reports new pain  Outcome: Progressing     Problem: INFECTION - ADULT  Goal: Absence or prevention of progression during hospitalization  Description: INTERVENTIONS:  - Assess and monitor for signs and symptoms of infection  - Monitor lab/diagnostic results  - Monitor all insertion sites, i e  indwelling lines, tubes, and drains  - Monitor endotracheal if appropriate and nasal secretions for changes in amount and color  - [place] appropriate cooling/warming therapies per order  - Administer medications as ordered  - Instruct and encourage patient and family to use good hand hygiene technique  - Identify and instruct in appropriate isolation precautions for identified infection/condition  Outcome: Progressing  Goal: Absence of fever/infection during neutropenic period  Description: INTERVENTIONS:  - Monitor WBC    Outcome: Progressing     Problem: SAFETY ADULT  Goal: Patient will remain free of falls  Description: INTERVENTIONS:  - Educate patient/family on patient safety including physical limitations  - Instruct patient to call for assistance with activity   - Consult OT/PT to assist with strengthening/mobility   - Keep Call bell within reach  - Keep bed low and locked with side rails adjusted as appropriate  - Keep care items and personal belongings within reach  - Initiate and maintain comfort rounds  - Make Fall Risk Sign visible to staff  - Offer Toileting every  Hours, in advance of need  - Initiate/Maintain alarm  - Obtain necessary fall risk management equipment:   - Apply yellow socks and bracelet for high fall risk patients  - Consider moving patient to room near nurses station  Outcome: Progressing  Goal: Maintain or return to baseline ADL function  Description: INTERVENTIONS:  -  Assess patient's ability to carry out ADLs; assess patient's baseline for ADL function and identify physical deficits which impact ability to perform ADLs (bathing, care of mouth/teeth, toileting, grooming, dressing, etc )  - Assess/evaluate cause of self-care deficits   - Assess range of motion  - Assess patient's mobility; develop plan if impaired  - Assess patient's need for assistive devices and provide as appropriate  - Encourage maximum independence but intervene and supervise when necessary  - Involve family in performance of ADLs  - Assess for home care needs following discharge   - Consider OT consult to assist with ADL evaluation and planning for discharge  - Provide patient education as appropriate  Outcome: Progressing  Goal: Maintains/Returns to pre admission functional level  Description: INTERVENTIONS:  - Perform BMAT or MOVE assessment daily    - Set and communicate daily mobility goal to care team and patient/family/caregiver  - Collaborate with rehabilitation services on mobility goals if consulted  - Perform Range of Motion  times a day  - Reposition patient every  hours  - Dangle patient  times a day  - Stand patient  times a day  - Ambulate patient  times a day  - Out of bed to chair  times a day   - Out of bed for meals times a day  - Out of bed for toileting  - Record patient progress and toleration of activity level   Outcome: Progressing     Problem: Knowledge Deficit  Goal: Patient/family/caregiver demonstrates understanding of disease process, treatment plan, medications, and discharge instructions  Description: Complete learning assessment and assess knowledge base  Interventions:  - Provide teaching at level of understanding  - Provide teaching via preferred learning methods  Outcome: Progressing     Problem: DISCHARGE PLANNING  Goal: Discharge to home or other facility with appropriate resources  Description: INTERVENTIONS:  - Identify barriers to discharge w/patient and caregiver  - Arrange for needed discharge resources and transportation as appropriate  - Identify discharge learning needs (meds, wound care, etc )  - Arrange for interpretive services to assist at discharge as needed  - Refer to Case Management Department for coordinating discharge planning if the patient needs post-hospital services based on physician/advanced practitioner order or complex needs related to functional status, cognitive ability, or social support system  Outcome: Progressing     Problem: PAIN -   Goal: Displays adequate comfort level or baseline comfort level  Description: INTERVENTIONS:  - Perform pain scoring using age-appropriate tool with hands-on care as needed    Notify physician/AP of high pain scores not responsive to comfort measures  - Administer analgesics based on type and severity of pain and evaluate response  - Sucrose analgesia per protocol for brief minor painful procedures  - Teach parents interventions for comforting infant  Outcome: Progressing     Problem: THERMOREGULATION - PEDIATRICS  Goal: Maintains normal body temperature  Description: Interventions:  - Monitor temperature (axillary for Newborns) as ordered  - Monitor for signs of hypothermia or hyperthermia  - Provide thermal support measures  - Wean to open crib when appropriate  Outcome: Progressing     Problem: INFECTION -   Goal: No evidence of infection  Description: INTERVENTIONS:  - Instruct family/visitors to use good hand hygiene technique  - Identify and instruct in appropriate isolation precautions for identified infection/condition  - Change incubator every 2 weeks or as needed  - Monitor for symptoms of infection  - Monitor surgical sites and insertion sites for all indwelling lines, tubes, and drains for drainage, redness, or edema   - Monitor endotracheal and nasal secretions for changes in amount and color  - Monitor culture and CBC results  - Administer antibiotics as ordered  Monitor drug levels  Outcome: Progressing     Problem: RISK FOR INFECTION (RISK FACTORS FOR MATERNAL CHORIOAMNIOITIS - )  Goal: No evidence of infection  Description: INTERVENTIONS:  - Instruct family/visitors to use good hand hygiene technique  - Monitor for symptoms of infection  - Monitor culture and CBC results  - Administer antibiotics as ordered    Monitor drug levels  Outcome: Progressing     Problem: SAFETY -   Goal: Patient will remain free from falls  Description: INTERVENTIONS:  - Instruct family/caregiver on patient safety  - Keep incubator doors and portholes closed when unattended  - Keep radiant warmer side rails and crib rails up when unattended  - Based on caregiver fall risk screen, instruct family/caregiver to ask for assistance with transferring infant if caregiver noted to have fall risk factors  Outcome: Progressing     Problem: Knowledge Deficit  Goal: Patient/family/caregiver demonstrates understanding of disease process, treatment plan, medications, and discharge instructions  Description: Complete learning assessment and assess knowledge base    Interventions:  - Provide teaching at level of understanding  - Provide teaching via preferred learning methods  Outcome: Progressing  Goal: Infant caregiver verbalizes understanding of benefits of skin-to-skin with healthy   Description: Prior to delivery, educate patient regarding skin-to-skin practice and its benefits  Initiate immediate and uninterrupted skin-to-skin contact after birth until breastfeeding is initiated or a minimum of one hour  Encourage continued skin-to-skin contact throughout the post partum stay    Outcome: Progressing  Goal: Infant caregiver verbalizes understanding of benefits and management of breastfeeding their healthy   Description: Help initiate breastfeeding within one hour of birth  Educate/assist with breastfeeding positioning and latch  Educate on safe positioning and to monitor their  for safety  Educate on how to maintain lactation even if they are  from their   Educate/initiate pumping for a mom with a baby in the NICU within 6 hours after birth  Give infants no food or drink other than breast milk unless medically indicated  Educate on feeding cues and encourage breastfeeding on demand    Outcome: Progressing  Goal: Infant caregiver verbalizes understanding of benefits to rooming-in with their healthy   Description: Promote rooming in 23 out of 24 hours per day  Educate on benefits to rooming-in  Provide  care in room with parents as long as infant and mother condition allow    Outcome: Progressing  Goal: Provide formula feeding instructions and preparation information to caregivers who do not wish to breastfeed their   Description: Provide one on one information on frequency, amount, and burping for formula feeding caregivers throughout their stay and at discharge  Provide written information/video on formula preparation  Outcome: Progressing  Goal: Infant caregiver verbalizes understanding of support and resources for follow up after discharge  Description: Provide individual discharge education on when to call the doctor  Provide resources and contact information for post-discharge support      Outcome: Progressing

## 2022-09-30 VITALS
SYSTOLIC BLOOD PRESSURE: 130 MMHG | HEIGHT: 67 IN | TEMPERATURE: 98 F | RESPIRATION RATE: 18 BRPM | HEART RATE: 81 BPM | OXYGEN SATURATION: 98 % | BODY MASS INDEX: 43.47 KG/M2 | WEIGHT: 277 LBS | DIASTOLIC BLOOD PRESSURE: 77 MMHG

## 2022-09-30 PROCEDURE — 99024 POSTOP FOLLOW-UP VISIT: CPT | Performed by: OBSTETRICS & GYNECOLOGY

## 2022-09-30 RX ORDER — DOCUSATE SODIUM 100 MG/1
100 CAPSULE, LIQUID FILLED ORAL 2 TIMES DAILY
Refills: 0
Start: 2022-09-30

## 2022-09-30 RX ORDER — IBUPROFEN 600 MG/1
600 TABLET ORAL EVERY 6 HOURS PRN
Qty: 30 TABLET | Refills: 0
Start: 2022-09-30

## 2022-09-30 RX ORDER — ACETAMINOPHEN 325 MG/1
650 TABLET ORAL EVERY 6 HOURS PRN
Refills: 0
Start: 2022-09-30

## 2022-09-30 RX ORDER — CALCIUM CARBONATE 200(500)MG
1000 TABLET,CHEWABLE ORAL 2 TIMES DAILY PRN
Refills: 0
Start: 2022-09-30

## 2022-09-30 RX ORDER — DIAPER,BRIEF,INFANT-TODD,DISP
1 EACH MISCELLANEOUS DAILY PRN
Qty: 30 G | Refills: 0
Start: 2022-09-30

## 2022-09-30 RX ADMIN — DOCUSATE SODIUM 100 MG: 100 CAPSULE, LIQUID FILLED ORAL at 09:51

## 2022-09-30 RX ADMIN — ENOXAPARIN SODIUM 40 MG: 40 INJECTION SUBCUTANEOUS at 09:51

## 2022-09-30 NOTE — LACTATION NOTE
This note was copied from a baby's chart  Signs of a good latch/feed and outpatient instructions/resources reviewed  Mom called back in to help with deep latch  Recommended holding baby's head with dominate hand and breast with non dominate hand while teaching baby to latch  Mom chose left breast to start  Cross cradle hold and quickly guided baby to deep latch  Stimulated to suck converting to rocker suckling  Nursed well till popped off  Burped  Worked on positioning infant up at chest level and starting to feed infant with nose arriving at the nipple  Then, using areolar compression to achieve a deep latch that is comfortable and exchanges optimum amounts of milk  Then to left breast using football hold  Baby again quickly guided to deep latch converting to rocker suckling  Mom noted better suckling and less discomfort  Mom feels more ready for discharge feeding in a few different positions  Dad remains supportive at bedside  Encouraged parents to call for assistance, questions, and concerns about breastfeeding  Extension provided

## 2022-09-30 NOTE — PLAN OF CARE
Problem: Potential for Falls  Goal: Patient will remain free of falls  Description: INTERVENTIONS:  - Educate patient/family on patient safety including physical limitations  - Instruct patient to call for assistance with activity   - Consult OT/PT to assist with strengthening/mobility   - Keep Call bell within reach  - Keep bed low and locked with side rails adjusted as appropriate  - Keep care items and personal belongings within reach  - Initiate and maintain comfort rounds  - Make Fall Risk Sign visible to staff  - Apply yellow socks and bracelet for high fall risk patients  - Consider moving patient to room near nurses station  Outcome: Progressing     Problem: PAIN - ADULT  Goal: Verbalizes/displays adequate comfort level or baseline comfort level  Description: Interventions:  - Encourage patient to monitor pain and request assistance  - Assess pain using appropriate pain scale  - Administer analgesics based on type and severity of pain and evaluate response  - Implement non-pharmacological measures as appropriate and evaluate response  - Consider cultural and social influences on pain and pain management  - Notify physician/advanced practitioner if interventions unsuccessful or patient reports new pain  Outcome: Progressing     Problem: INFECTION - ADULT  Goal: Absence or prevention of progression during hospitalization  Description: INTERVENTIONS:  - Assess and monitor for signs and symptoms of infection  - Monitor lab/diagnostic results  - Monitor all insertion sites, i e  indwelling lines, tubes, and drains  - Monitor endotracheal if appropriate and nasal secretions for changes in amount and color  - [place] appropriate cooling/warming therapies per order  - Administer medications as ordered  - Instruct and encourage patient and family to use good hand hygiene technique  - Identify and instruct in appropriate isolation precautions for identified infection/condition  Outcome: Progressing  Goal: Absence of fever/infection during neutropenic period  Description: INTERVENTIONS:  - Monitor WBC    Outcome: Progressing     Problem: SAFETY ADULT  Goal: Patient will remain free of falls  Description: INTERVENTIONS:  - Educate patient/family on patient safety including physical limitations  - Instruct patient to call for assistance with activity   - Consult OT/PT to assist with strengthening/mobility   - Keep Call bell within reach  - Keep bed low and locked with side rails adjusted as appropriate  - Keep care items and personal belongings within reach  - Initiate and maintain comfort rounds  - Make Fall Risk Sign visible to staff  - Apply yellow socks and bracelet for high fall risk patients  - Consider moving patient to room near nurses station  Outcome: Progressing  Goal: Maintain or return to baseline ADL function  Description: INTERVENTIONS:  -  Assess patient's ability to carry out ADLs; assess patient's baseline for ADL function and identify physical deficits which impact ability to perform ADLs (bathing, care of mouth/teeth, toileting, grooming, dressing, etc )  - Assess/evaluate cause of self-care deficits   - Assess range of motion  - Assess patient's mobility; develop plan if impaired  - Assess patient's need for assistive devices and provide as appropriate  - Encourage maximum independence but intervene and supervise when necessary  - Involve family in performance of ADLs  - Assess for home care needs following discharge   - Consider OT consult to assist with ADL evaluation and planning for discharge  - Provide patient education as appropriate  Outcome: Progressing  Goal: Maintains/Returns to pre admission functional level  Description: INTERVENTIONS:  - Perform BMAT or MOVE assessment daily    - Set and communicate daily mobility goal to care team and patient/family/caregiver     - Collaborate with rehabilitation services on mobility goals if consulted  - Out of bed for toileting  - Record patient progress and toleration of activity level   Outcome: Progressing     Problem: Knowledge Deficit  Goal: Patient/family/caregiver demonstrates understanding of disease process, treatment plan, medications, and discharge instructions  Description: Complete learning assessment and assess knowledge base  Interventions:  - Provide teaching at level of understanding  - Provide teaching via preferred learning methods  Outcome: Progressing     Problem: DISCHARGE PLANNING  Goal: Discharge to home or other facility with appropriate resources  Description: INTERVENTIONS:  - Identify barriers to discharge w/patient and caregiver  - Arrange for needed discharge resources and transportation as appropriate  - Identify discharge learning needs (meds, wound care, etc )  - Arrange for interpretive services to assist at discharge as needed  - Refer to Case Management Department for coordinating discharge planning if the patient needs post-hospital services based on physician/advanced practitioner order or complex needs related to functional status, cognitive ability, or social support system  Outcome: Progressing     Problem: PAIN -   Goal: Displays adequate comfort level or baseline comfort level  Description: INTERVENTIONS:  - Perform pain scoring using age-appropriate tool with hands-on care as needed    Notify physician/AP of high pain scores not responsive to comfort measures  - Administer analgesics based on type and severity of pain and evaluate response  - Sucrose analgesia per protocol for brief minor painful procedures  - Teach parents interventions for comforting infant  Outcome: Progressing     Problem: THERMOREGULATION - PEDIATRICS  Goal: Maintains normal body temperature  Description: Interventions:  - Monitor temperature (axillary for Newborns) as ordered  - Monitor for signs of hypothermia or hyperthermia  - Provide thermal support measures  - Wean to open crib when appropriate  Outcome: Progressing     Problem: INFECTION -   Goal: No evidence of infection  Description: INTERVENTIONS:  - Instruct family/visitors to use good hand hygiene technique  - Identify and instruct in appropriate isolation precautions for identified infection/condition  - Change incubator every 2 weeks or as needed  - Monitor for symptoms of infection  - Monitor surgical sites and insertion sites for all indwelling lines, tubes, and drains for drainage, redness, or edema   - Monitor endotracheal and nasal secretions for changes in amount and color  - Monitor culture and CBC results  - Administer antibiotics as ordered  Monitor drug levels  Outcome: Progressing     Problem: RISK FOR INFECTION (RISK FACTORS FOR MATERNAL CHORIOAMNIOITIS - )  Goal: No evidence of infection  Description: INTERVENTIONS:  - Instruct family/visitors to use good hand hygiene technique  - Monitor for symptoms of infection  - Monitor culture and CBC results  - Administer antibiotics as ordered  Monitor drug levels  Outcome: Progressing     Problem: SAFETY -   Goal: Patient will remain free from falls  Description: INTERVENTIONS:  - Instruct family/caregiver on patient safety  - Keep incubator doors and portholes closed when unattended  - Keep radiant warmer side rails and crib rails up when unattended  - Based on caregiver fall risk screen, instruct family/caregiver to ask for assistance with transferring infant if caregiver noted to have fall risk factors  Outcome: Progressing     Problem: Knowledge Deficit  Goal: Patient/family/caregiver demonstrates understanding of disease process, treatment plan, medications, and discharge instructions  Description: Complete learning assessment and assess knowledge base    Interventions:  - Provide teaching at level of understanding  - Provide teaching via preferred learning methods  Outcome: Progressing  Goal: Infant caregiver verbalizes understanding of benefits of skin-to-skin with healthy   Description: Prior to delivery, educate patient regarding skin-to-skin practice and its benefits  Initiate immediate and uninterrupted skin-to-skin contact after birth until breastfeeding is initiated or a minimum of one hour  Encourage continued skin-to-skin contact throughout the post partum stay    Outcome: Progressing  Goal: Infant caregiver verbalizes understanding of benefits and management of breastfeeding their healthy   Description: Help initiate breastfeeding within one hour of birth  Educate/assist with breastfeeding positioning and latch  Educate on safe positioning and to monitor their  for safety  Educate on how to maintain lactation even if they are  from their   Educate/initiate pumping for a mom with a baby in the NICU within 6 hours after birth  Give infants no food or drink other than breast milk unless medically indicated  Educate on feeding cues and encourage breastfeeding on demand    Outcome: Progressing  Goal: Infant caregiver verbalizes understanding of benefits to rooming-in with their healthy   Description: Promote rooming in 23 out of 24 hours per day  Educate on benefits to rooming-in  Provide  care in room with parents as long as infant and mother condition allow    Outcome: Progressing  Goal: Provide formula feeding instructions and preparation information to caregivers who do not wish to breastfeed their   Description: Provide one on one information on frequency, amount, and burping for formula feeding caregivers throughout their stay and at discharge  Provide written information/video on formula preparation  Outcome: Progressing  Goal: Infant caregiver verbalizes understanding of support and resources for follow up after discharge  Description: Provide individual discharge education on when to call the doctor  Provide resources and contact information for post-discharge support      Outcome: Progressing

## 2022-09-30 NOTE — PROGRESS NOTES
Progress Note - OB/GYN   Zee Tong 33 y o  female MRN: [id number]  Unit/Bed#: L&D 308-01 Encounter: [id number]    Assessment:  Post partum Day #2 s/p , stable, baby in room    Plan:  Forehead laceration  Assessment & Plan  Sustained in   Appears to require sutures  Gen surg consulted  Gestational diabetes mellitus (GDM) in third trimester  Assessment & Plan  Lab Results   Component Value Date    HGBA1C 4 9 2022       No results for input(s): POCGLU in the last 72 hours  Blood Sugar Average: Last 72 hrs:      Chronic hypertension affecting pregnancy  Assessment & Plan  Systolic (24PLV), YRK:331 , Min:107 , SHIRLEY:422      Diastolic (12ITN), JOSE EDUARDO:55, Min:55, Max:55      PreE Labs wnl   P:C ratio: 1 16        *  (spontaneous vaginal delivery)  Assessment & Plan  Recovering well   Encourage Ambulation  Encourage breastfeeding  GBS negative   Rh +  Contraception: undecided  Discharge PPD#2      37 weeks gestation of pregnancy-resolved as of 2022  Assessment & Plan  Admit to OBN   Clear liquid diet   F/u T&S, CBC, RPR   IVF NS 125cc/hr   Continuous fetal monitoring and tocometry   Analgesia at maternal request   Vertex by TAUS  Induction plan: scott balloon, cytotec, pitocin          Subjective/Objective   Chief Complaint:     Post delivery  Patient is doing well  Lochia WNL  Pain well controlled       Subjective:     Pain: yes, cramping, improved with meds  Tolerating PO: yes  Voiding: yes  Flatus: yes  Ambulating: yes  Chest pain: no  Shortness of breath: no  Leg pain: no  Lochia: minimal    Objective:     Vitals: /55 (BP Location: Left arm)   Pulse 80   Temp (!) 97 3 °F (36 3 °C) (Temporal)   Resp 18   Ht 5' 7" (1 702 m)   Wt 126 kg (277 lb)   LMP 2022   SpO2 98%   Breastfeeding Yes   BMI 43 38 kg/m²     I/O        07 07 07 07    Urine (mL/kg/hr) 2500 (0 8)     Blood 625     Total Output 3125     Net -3125                 Lab Results   Component Value Date    WBC 18 67 (H) 09/28/2022    HGB 10 1 (L) 09/28/2022    HCT 31 0 (L) 09/28/2022    MCV 91 09/28/2022     09/28/2022       Physical Exam:     Gen: AAOx3, NAD  CV: no acute distress  Lungs: no acute distress  Abd: Soft, non-tender, non-distended, no rebound or guarding  Uterine fundus firm and non-tender, 1 cm below the umbilicus    Ext: Non tender    [facility]  OBGYN PGY-2  9/30/2022  6:18 AM

## 2022-10-03 DIAGNOSIS — Z13.1 DIABETES MELLITUS SCREENING: ICD-10-CM

## 2022-10-03 DIAGNOSIS — Z86.32 HISTORY OF GESTATIONAL DIABETES: Primary | ICD-10-CM

## 2022-10-04 ENCOUNTER — OFFICE VISIT (OUTPATIENT)
Dept: SURGERY | Facility: CLINIC | Age: 32
End: 2022-10-04

## 2022-10-04 VITALS
TEMPERATURE: 96.6 F | WEIGHT: 278.1 LBS | HEART RATE: 108 BPM | DIASTOLIC BLOOD PRESSURE: 96 MMHG | BODY MASS INDEX: 43.65 KG/M2 | SYSTOLIC BLOOD PRESSURE: 139 MMHG | RESPIRATION RATE: 16 BRPM | HEIGHT: 67 IN

## 2022-10-04 DIAGNOSIS — S01.81XA LACERATION OF FOREHEAD, INITIAL ENCOUNTER: Primary | ICD-10-CM

## 2022-10-04 PROCEDURE — 99024 POSTOP FOLLOW-UP VISIT: CPT | Performed by: SURGERY

## 2022-10-04 NOTE — PROGRESS NOTES
Here for suture removal       Right eyebrow  Laceration healing well  Prolene sutures removed without difficulty  Follow-up p r n

## 2022-10-06 PROCEDURE — 88307 TISSUE EXAM BY PATHOLOGIST: CPT | Performed by: PATHOLOGY

## 2022-10-10 ENCOUNTER — OFFICE VISIT (OUTPATIENT)
Dept: OBGYN CLINIC | Facility: MEDICAL CENTER | Age: 32
End: 2022-10-10

## 2022-10-10 VITALS — SYSTOLIC BLOOD PRESSURE: 120 MMHG | DIASTOLIC BLOOD PRESSURE: 83 MMHG

## 2022-10-10 DIAGNOSIS — Z01.30 BP CHECK: Primary | ICD-10-CM

## 2022-10-10 NOTE — PROGRESS NOTES
Patient here for bp check, 120/83 on left arm  Patient was clear by Dr Claudette Bucker to returned for her 6 week post partum appointment   Patient was advised to call the office if any issues

## 2022-11-08 ENCOUNTER — POSTPARTUM VISIT (OUTPATIENT)
Dept: OBGYN CLINIC | Facility: MEDICAL CENTER | Age: 32
End: 2022-11-08

## 2022-11-08 VITALS
HEIGHT: 67 IN | DIASTOLIC BLOOD PRESSURE: 88 MMHG | SYSTOLIC BLOOD PRESSURE: 120 MMHG | WEIGHT: 276.5 LBS | BODY MASS INDEX: 43.4 KG/M2

## 2022-11-08 DIAGNOSIS — O24.419 GESTATIONAL DIABETES MELLITUS (GDM) IN THIRD TRIMESTER, GESTATIONAL DIABETES METHOD OF CONTROL UNSPECIFIED: ICD-10-CM

## 2022-11-08 DIAGNOSIS — Z30.011 ENCOUNTER FOR INITIAL PRESCRIPTION OF CONTRACEPTIVE PILLS: ICD-10-CM

## 2022-11-08 RX ORDER — ACETAMINOPHEN AND CODEINE PHOSPHATE 120; 12 MG/5ML; MG/5ML
1 SOLUTION ORAL DAILY
Qty: 84 TABLET | Refills: 3 | Status: SHIPPED | OUTPATIENT
Start: 2022-11-08

## 2022-11-08 NOTE — LETTER
November 8, 2022       Patient: Claudene Ditto   YOB: 1990   Date of Visit: 11/8/2022       Dear Employer:    Manny Brennan was seen in my office for postpartum evaluation after delivering her son on 9/28/22  She is cleared to return to work as scheduled on 12/5/22  If you have questions, please do not hesitate to call me            Sincerely,        Scott Weinberg MD

## 2022-11-14 ENCOUNTER — APPOINTMENT (OUTPATIENT)
Dept: LAB | Facility: HOSPITAL | Age: 32
End: 2022-11-14

## 2022-11-14 ENCOUNTER — OFFICE VISIT (OUTPATIENT)
Dept: FAMILY MEDICINE CLINIC | Facility: CLINIC | Age: 32
End: 2022-11-14

## 2022-11-14 VITALS
TEMPERATURE: 97.5 F | OXYGEN SATURATION: 99 % | HEIGHT: 67 IN | SYSTOLIC BLOOD PRESSURE: 138 MMHG | BODY MASS INDEX: 44.17 KG/M2 | DIASTOLIC BLOOD PRESSURE: 94 MMHG | HEART RATE: 98 BPM | WEIGHT: 281.4 LBS

## 2022-11-14 DIAGNOSIS — Z00.00 ANNUAL PHYSICAL EXAM: Primary | ICD-10-CM

## 2022-11-14 DIAGNOSIS — O24.410 DIET CONTROLLED GESTATIONAL DIABETES MELLITUS (GDM) IN THIRD TRIMESTER: ICD-10-CM

## 2022-11-14 DIAGNOSIS — O16.1 ELEVATED BLOOD PRESSURE AFFECTING PREGNANCY IN FIRST TRIMESTER, ANTEPARTUM: ICD-10-CM

## 2022-11-14 DIAGNOSIS — Z13.1 DIABETES MELLITUS SCREENING: ICD-10-CM

## 2022-11-14 DIAGNOSIS — Z86.32 HISTORY OF GESTATIONAL DIABETES: ICD-10-CM

## 2022-11-14 DIAGNOSIS — Z23 NEEDS FLU SHOT: ICD-10-CM

## 2022-11-14 DIAGNOSIS — R73.9 HYPERGLYCEMIA: ICD-10-CM

## 2022-11-14 LAB
ERYTHROCYTE [DISTWIDTH] IN BLOOD BY AUTOMATED COUNT: 13.2 % (ref 11.6–15.1)
GLUCOSE 2H P 75 G GLC PO SERPL-MCNC: 141 MG/DL (ref 65–139)
GLUCOSE P FAST SERPL-MCNC: 99 MG/DL (ref 65–99)
HCT VFR BLD AUTO: 39.1 % (ref 34.8–46.1)
HGB BLD-MCNC: 12.4 G/DL (ref 11.5–15.4)
MCH RBC QN AUTO: 27.5 PG (ref 26.8–34.3)
MCHC RBC AUTO-ENTMCNC: 31.7 G/DL (ref 31.4–37.4)
MCV RBC AUTO: 87 FL (ref 82–98)
PLATELET # BLD AUTO: 351 THOUSANDS/UL (ref 149–390)
PMV BLD AUTO: 8.9 FL (ref 8.9–12.7)
RBC # BLD AUTO: 4.51 MILLION/UL (ref 3.81–5.12)
WBC # BLD AUTO: 10.85 THOUSAND/UL (ref 4.31–10.16)

## 2022-11-14 NOTE — PROGRESS NOTES
ADULT ANNUAL [facility] PRIMARY CARE    NAME: Montana Mcgovern  AGE: 33 y o  SEX: female  : 1990     DATE: 2022     Assessment and Plan:     Problem List Items Addressed This Visit        Endocrine    Gestational diabetes mellitus (GDM) in third trimester    Relevant Orders    HEMOGLOBIN A1C W/ EAG ESTIMATION      Other Visit Diagnoses     Annual physical exam    -  Primary    Hyperglycemia        Relevant Orders    HEMOGLOBIN A1C W/ EAG ESTIMATION    Elevated blood pressure affecting pregnancy in first trimester, antepartum        Needs flu shot        Relevant Orders    influenza vaccine, quadrivalent, 0 5 mL, preservative-free, for adult and pediatric patients 6 mos+ (AFLURIA, FLUARIX, FLULAVAL, FLUZONE)          Immunizations and preventive care screenings were discussed with patient today  Appropriate education was printed on patient's after visit summary  Counseling:  · Elevated blood sugar and blood pressure during her recent pregnancy  BMI Counseling: Body mass index is 44 07 kg/m²  The BMI is above normal  Nutrition recommendations include encouraging healthy choices of fruits and vegetables  Rationale for BMI follow-up plan is due to patient being overweight or obese  Depression Screening and Follow-up Plan: Patient was screened for depression during today's encounter  They screened negative with a PHQ-2 score of 0  Will HA1C completed in 6 weeks to give more accurate results of a 3 month average  BP borderline today - will have her return in 4 weeks to have her BP rechecked  Return in about 4 weeks (around 2022)       Chief Complaint:     Chief Complaint   Patient presents with   • Physical Exam   • Care Gap Request     BMI due today      History of Present Illness:     Adult Annual Physical   Patient here for a comprehensive physical exam  The patient reports problems - history of gestational diabetes, managed with diet  Completed repeat glucose tolerance test today - notes it was 141  BP moderately higher at times during pregnancy, did have pre-eclampsia       Diet and Physical Activity  · Diet/Nutrition: well balanced diet  · Exercise: no formal exercise  Depression Screening  PHQ-2/9 Depression Screening    Little interest or pleasure in doing things: 0 - not at all  Feeling down, depressed, or hopeless: 0 - not at all  PHQ-2 Score: 0  PHQ-2 Interpretation: Negative depression screen       General Health  · Sleep: sleeps well  · Hearing: normal - bilateral   · Vision: no vision problems and wears glasses  · Dental: regular dental visits  /GYN Health  · Last menstrual period: has not had menses yet since delivery, will be starting oral contraceptives  · Contraceptive method: oral contraceptives  · History of STDs?: no      Review of Systems:     Review of Systems   Constitutional: Negative  HENT: Negative  Respiratory: Negative  Cardiovascular: Negative  Gastrointestinal: Negative  Neurological: Negative  All other systems reviewed and are negative       Past Medical History:     Past Medical History:   Diagnosis Date   • Chronic hypertension affecting pregnancy 6/27/2022   • Gallstones    • Gestational diabetes mellitus (GDM) in third trimester 7/25/2022   • History of COVID-19 March 2021-no hospitalization   • Varicella     vaccine   • Wears glasses       Past Surgical History:     Past Surgical History:   Procedure Laterality Date   • CHOLECYSTECTOMY     • KNEE SURGERY Right    • OK LAP,CHOLECYSTECTOMY N/A 12/15/2021    Procedure: CHOLECYSTECTOMY LAPAROSCOPIC W/ ROBOTICS;  Surgeon: Clarita Rodríguez MD;  Location: [facility] OR;  Service: General   • WISDOM TOOTH EXTRACTION        Social History:     Social History     Socioeconomic History   • Marital status: /Civil Union     Spouse name: None   • Number of children: None   • Years of education: None   • Highest education level: None   Occupational History   • None   Tobacco Use   • Smoking status: Never Smoker   • Smokeless tobacco: Never Used   Vaping Use   • Vaping Use: Never used   Substance and Sexual Activity   • Alcohol use: Not Currently     Comment: social   • Drug use: Never   • Sexual activity: Yes     Partners: Male     Birth control/protection: None   Other Topics Concern   • None   Social History Narrative   • None     Social Determinants of Health     Financial Resource Strain: Not on file   Food Insecurity: Not on file   Transportation Needs: Not on file   Physical Activity: Not on file   Stress: Not on file   Social Connections: Not on file   Intimate Partner Violence: Not on file   Housing Stability: Not on file      Family History:     Family History   Problem Relation Age of Onset   • Breast cancer Mother    • Hyperparathyroidism Mother    • Hypertension Mother    • Hypertension Father    • Hyperlipidemia Father    • Diabetes Maternal Grandmother    • Hypertension Maternal Grandmother    • Heart failure Maternal Grandmother    • Heart disease Maternal Grandmother    • Skin cancer Maternal Grandmother    • No Known Problems Maternal Grandfather    • Aneurysm Paternal Grandmother    • COPD Paternal Grandmother    • Heart attack Paternal Grandfather       Current Medications:     Current Outpatient Medications   Medication Sig Dispense Refill   • norethindrone (Robina) 0 35 MG tablet Take 1 tablet (0 35 mg total) by mouth daily 84 tablet 3   • Prenatal MV & Min w/FA-DHA (ONE A DAY PRENATAL PO)        No current facility-administered medications for this visit        Allergies:     No Known Allergies   Physical Exam:     /94 (BP Location: Left arm, Patient Position: Sitting, Cuff Size: Large)   Pulse 98   Temp 97 5 °F (36 4 °C)   Ht 5' 7" (1 702 m)   Wt 128 kg (281 lb 6 4 oz)   LMP 01/05/2022   SpO2 99%   BMI 44 07 kg/m²     Physical Exam  Cardiovascular:      Rate and Rhythm: Normal rate and regular rhythm  Heart sounds: Normal heart sounds  Pulmonary:      Effort: Pulmonary effort is normal       Breath sounds: Normal breath sounds  Neurological:      Mental Status: She is alert and oriented to person, place, and time            Lexie Hernandez [place] [address] PRIMARY [facility]

## 2022-11-26 PROBLEM — S01.81XA FOREHEAD LACERATION: Status: RESOLVED | Noted: 2022-09-27 | Resolved: 2022-11-26

## 2022-11-30 ENCOUNTER — TELEPHONE (OUTPATIENT)
Dept: FAMILY MEDICINE CLINIC | Facility: CLINIC | Age: 32
End: 2022-11-30

## 2022-11-30 DIAGNOSIS — R09.81 SINUS CONGESTION: Primary | ICD-10-CM

## 2022-11-30 RX ORDER — AZITHROMYCIN 250 MG/1
TABLET, FILM COATED ORAL
Qty: 6 TABLET | Refills: 0 | Status: SHIPPED | OUTPATIENT
Start: 2022-11-30 | End: 2022-12-04

## 2022-11-30 NOTE — TELEPHONE ENCOUNTER
Patient called requesting medication for sick symptoms  She has had a sore throat, nasal congestion, coughing, fever of 101  Patient took tylenol and was anle to get fever down to 99  She has a negative COVID test      She is requesting medication sent to [facility] in [facility]  She also wanted to make you aware that she is breastfeeding

## 2022-12-02 NOTE — PROGRESS NOTES
Assessment:  33 y o  Vanita Rashid who is postpartum 6wks s/p   Plan:  Diagnoses and all orders for this visit:    Postpartum exam  -     Routine postpartum care  - Anticipatory guidance given  - CBC and Platelet; Future  - Return for yearly    Gestational diabetes mellitus (GDM) in third trimester, gestational diabetes method of control unspecified  - 2 hour GT    Encounter for initial prescription of contraceptive pills  -     norethindrone (Robina) 0 35 MG tablet; Take 1 tablet (0 35 mg total) by mouth daily      __________________________________________________________________    Subjective   Zee Jose Merida is a 33 y o  Vanita Rashid who presents postpartum 6wks s/p  of a 7lb 6 5oz baby boy  Pregnancy was complicated by GDM  Patient reports overall doing well  She notes her pain is resolved and does not require pain medication  Her bowel function and appetite are normal  She has returned to most of her normal activities  She has not yet returned to sexual activity  Lochia is resolved; no menses yet    She reports that she is doing well emotionally and denies depressive sx  Objective  /88   Ht 5' 7" (1 702 m)   Wt 125 kg (276 lb 8 oz)   LMP 2022   BMI 43 31 kg/m²      Physical Exam:  Physical Exam  Constitutional:       General: She is not in acute distress  Appearance: Normal appearance  She is not ill-appearing, toxic-appearing or diaphoretic  Eyes:      General: No scleral icterus  Right eye: No discharge  Left eye: No discharge  Conjunctiva/sclera: Conjunctivae normal    Cardiovascular:      Rate and Rhythm: Normal rate  Pulmonary:      Effort: Pulmonary effort is normal  No respiratory distress  Abdominal:      General: There is no distension  Palpations: There is no mass  Tenderness: There is no abdominal tenderness  There is no guarding or rebound  Hernia: No hernia is present  Musculoskeletal:         General: No swelling  Skin:     General: Skin is warm and dry  Coloration: Skin is not jaundiced or pale  Findings: No bruising or erythema  Neurological:      Mental Status: She is alert  Psychiatric:         Mood and Affect: Mood normal          Behavior: Behavior normal          Thought Content:  Thought content normal          Judgment: Judgment normal

## 2022-12-12 ENCOUNTER — OFFICE VISIT (OUTPATIENT)
Dept: FAMILY MEDICINE CLINIC | Facility: CLINIC | Age: 32
End: 2022-12-12

## 2022-12-12 VITALS
OXYGEN SATURATION: 99 % | HEART RATE: 112 BPM | TEMPERATURE: 97.3 F | BODY MASS INDEX: 44.1 KG/M2 | SYSTOLIC BLOOD PRESSURE: 120 MMHG | DIASTOLIC BLOOD PRESSURE: 82 MMHG | HEIGHT: 67 IN | WEIGHT: 281 LBS

## 2022-12-12 DIAGNOSIS — L98.9 SKIN LESION: ICD-10-CM

## 2022-12-12 DIAGNOSIS — Z01.30 BLOOD PRESSURE CHECK: Primary | ICD-10-CM

## 2022-12-12 NOTE — PROGRESS NOTES
Name: Matt Marte      : 1990      MRN: [id number]  Encounter Provider: OPHELIA Slade  Encounter Date: 2022   Encounter department: [address] [place] [address] PRIMARY CARE    Assessment & Plan     1  Blood pressure check    2  Skin lesion    Repeat BP in normal parameters  Mole of no concern on right buttock at this time  Will have HA1C in 2 weeks to recheck due to gestational diabetes  Subjective      Here today for a follow-up - hx of elevated BP during her recent pregnancy and gest diabetes  Also with a small mole on her right buttock that she is worried about as her grandmother was recently treated for skin CA  Review of Systems   Constitutional: Negative  HENT: Negative  Respiratory: Negative  Cardiovascular: Negative  Gastrointestinal: Negative  Skin:        See HPI   Neurological: Negative  All other systems reviewed and are negative  Current Outpatient Medications on File Prior to Visit   Medication Sig   • norethindrone (Orbina) 0 35 MG tablet Take 1 tablet (0 35 mg total) by mouth daily   • Prenatal MV & Min w/FA-DHA (ONE A DAY PRENATAL PO)        Objective     /82 (BP Location: Left arm, Patient Position: Sitting, Cuff Size: Standard)   Pulse (!) 112   Temp (!) 97 3 °F (36 3 °C)   Ht 5' 7" (1 702 m)   Wt 127 kg (281 lb)   SpO2 99%   BMI 44 01 kg/m²     Physical Exam  Skin:         Neurological:      Mental Status: She is alert and oriented to person, place, and time         Mona Arredondo

## 2023-01-16 ENCOUNTER — APPOINTMENT (OUTPATIENT)
Dept: LAB | Facility: CLINIC | Age: 33
End: 2023-01-16

## 2023-01-16 DIAGNOSIS — Z3A.20 20 WEEKS GESTATION OF PREGNANCY: ICD-10-CM

## 2023-01-16 DIAGNOSIS — Z34.92 SECOND TRIMESTER PREGNANCY: ICD-10-CM

## 2023-01-16 DIAGNOSIS — R73.9 HYPERGLYCEMIA: ICD-10-CM

## 2023-01-16 DIAGNOSIS — O24.410 DIET CONTROLLED GESTATIONAL DIABETES MELLITUS (GDM) IN THIRD TRIMESTER: ICD-10-CM

## 2023-01-16 LAB
EST. AVERAGE GLUCOSE BLD GHB EST-MCNC: 108 MG/DL
HBA1C MFR BLD: 5.4 %

## 2023-07-27 ENCOUNTER — APPOINTMENT (OUTPATIENT)
Dept: LAB | Facility: CLINIC | Age: 33
End: 2023-07-27

## 2023-07-27 DIAGNOSIS — Z00.8 ENCOUNTER FOR OTHER GENERAL EXAMINATION: ICD-10-CM

## 2023-07-27 LAB
CHOLEST SERPL-MCNC: 206 MG/DL
HDLC SERPL-MCNC: 66 MG/DL
LDLC SERPL CALC-MCNC: 104 MG/DL (ref 0–100)
NONHDLC SERPL-MCNC: 140 MG/DL
TRIGL SERPL-MCNC: 182 MG/DL

## 2023-07-27 PROCEDURE — 80061 LIPID PANEL: CPT

## 2023-07-27 PROCEDURE — 36415 COLL VENOUS BLD VENIPUNCTURE: CPT

## 2023-12-02 ENCOUNTER — OFFICE VISIT (OUTPATIENT)
Dept: URGENT CARE | Facility: MEDICAL CENTER | Age: 33
End: 2023-12-02
Payer: COMMERCIAL

## 2023-12-02 VITALS
TEMPERATURE: 98.3 F | DIASTOLIC BLOOD PRESSURE: 92 MMHG | SYSTOLIC BLOOD PRESSURE: 170 MMHG | OXYGEN SATURATION: 99 % | HEART RATE: 130 BPM | RESPIRATION RATE: 20 BRPM | WEIGHT: 293 LBS | BODY MASS INDEX: 46.83 KG/M2

## 2023-12-02 DIAGNOSIS — N92.6 IRREGULAR MENSES: Primary | ICD-10-CM

## 2023-12-02 DIAGNOSIS — L01.00 IMPETIGO ANY SITE: ICD-10-CM

## 2023-12-02 DIAGNOSIS — J02.9 SORE THROAT: ICD-10-CM

## 2023-12-02 DIAGNOSIS — B08.4 HAND, FOOT AND MOUTH DISEASE: ICD-10-CM

## 2023-12-02 LAB
S PYO AG THROAT QL: NEGATIVE
SL AMB  POCT GLUCOSE, UA: NORMAL
SL AMB LEUKOCYTE ESTERASE,UA: NORMAL
SL AMB POCT BILIRUBIN,UA: NORMAL
SL AMB POCT BLOOD,UA: NORMAL
SL AMB POCT CLARITY,UA: CLEAR
SL AMB POCT COLOR,UA: YELLOW
SL AMB POCT KETONES,UA: NORMAL
SL AMB POCT NITRITE,UA: NORMAL
SL AMB POCT PH,UA: 5
SL AMB POCT SPECIFIC GRAVITY,UA: 1.02
SL AMB POCT URINE HCG: NORMAL
SL AMB POCT URINE PROTEIN: NORMAL
SL AMB POCT UROBILINOGEN: 0.2

## 2023-12-02 PROCEDURE — 81002 URINALYSIS NONAUTO W/O SCOPE: CPT

## 2023-12-02 PROCEDURE — 87880 STREP A ASSAY W/OPTIC: CPT

## 2023-12-02 PROCEDURE — 99213 OFFICE O/P EST LOW 20 MIN: CPT

## 2023-12-02 PROCEDURE — 81025 URINE PREGNANCY TEST: CPT

## 2023-12-02 PROCEDURE — 87070 CULTURE OTHR SPECIMN AEROBIC: CPT

## 2023-12-02 NOTE — LETTER
December 2, 2023     Patient: Marissa Caraballo   YOB: 1990   Date of Visit: 12/2/2023       To Whom it May Concern:    Renata Orellana was seen in my clinic on 12/2/2023. She may return to work on a date when her rash has fully resolved and she is fever free x24 hours without fever reducing medicines. .    If you have any questions or concerns, please don't hesitate to call.          Sincerely,          OPHELIA Wall        CC: No Recipients

## 2023-12-02 NOTE — PROGRESS NOTES
[facility]        NAME: Chichi Godoy is a 29 y.o. female  : 1990    MRN: [id number]  DATE: 2023  TIME: 5:28 PM    Assessment and Plan   Irregular menses [N92.6]  1. Irregular menses  POCT urine dip    POCT urine HCG    CANCELED: Urine culture      2. Sore throat  POCT rapid strepA    mupirocin (BACTROBAN) 2 % ointment    Throat culture      3. Impetigo any site  mupirocin (BACTROBAN) 2 % ointment      4. Hand, foot and mouth disease              Patient Instructions       Follow up with PCP in 3-5 days. Proceed to  ER if symptoms worsen. Chief Complaint     Chief Complaint   Patient presents with   • Fever     100.9 at home   • Sore Throat     Sore throat started Thursday night. Has been taking ibuprofen   • Rash     Face, hands, and scalp rash          History of Present Illness       Patient Dc'd birth control in early July and hasn't had regular menses since then. LMP 10/25, took home pregnancy test- she is sexually and actively trying to conceive. Home pregnancy test was negative. POC HCG here also negative. POC urine was also negative. Patient here with spots on her b/l hands, face and on her scalp. She has been around [facility] as her son recently had it and also had impetigo with it as well. She is also having pain and sores on the inside of her mouth as well. She has not been taking anything for her symptoms other than some ibuprofen- last dose around 1200 today. Review of Systems   Review of Systems   Constitutional:  Positive for fever. Negative for appetite change, chills and fatigue. 100.9 tmax     HENT:  Negative for congestion, ear pain, postnasal drip, rhinorrhea, sinus pain, sore throat and trouble swallowing. Respiratory:  Negative for cough and shortness of breath. Cardiovascular:  Negative for chest pain and palpitations. Gastrointestinal:  Negative for abdominal pain, constipation and vomiting.    Genitourinary:  Positive for menstrual problem. Musculoskeletal:  Negative for arthralgias and back pain. Skin:  Positive for rash. Negative for color change. Neurological:  Negative for headaches. All other systems reviewed and are negative. Current Medications       Current Outpatient Medications:   •  mupirocin (BACTROBAN) 2 % ointment, Apply topically 3 (three) times a day, Disp: 30 g, Rfl: 0  •  Prenatal MV & Min w/FA-DHA (ONE A DAY PRENATAL PO), , Disp: , Rfl:     Current Allergies     Allergies as of 12/02/2023   • (No Known Allergies)            The following portions of the patient's history were reviewed and updated as appropriate: allergies, current medications, past family history, past medical history, past social history, past surgical history and problem list.     Past Medical History:   Diagnosis Date   • Chronic hypertension affecting pregnancy 6/27/2022   • Gallstones    • Gestational diabetes mellitus (GDM) in third trimester 7/25/2022   • History of COVID-19     March 2021-no hospitalization   • Varicella     vaccine   • Wears glasses        Past Surgical History:   Procedure Laterality Date   • CHOLECYSTECTOMY     • KNEE SURGERY Right    • SD LAPAROSCOPY SURG CHOLECYSTECTOMY N/A 12/15/2021    Procedure: CHOLECYSTECTOMY LAPAROSCOPIC W/ ROBOTICS;  Surgeon: Soha Duval MD;  Location: [facility];  Service: General   • WISDOM TOOTH EXTRACTION         Family History   Problem Relation Age of Onset   • Breast cancer Mother    • Hyperparathyroidism Mother    • Hypertension Mother    • Hypertension Father    • Hyperlipidemia Father    • Diabetes Maternal Grandmother    • Hypertension Maternal Grandmother    • Heart failure Maternal Grandmother    • Heart disease Maternal Grandmother    • Skin cancer Maternal Grandmother    • No Known Problems Maternal Grandfather    • Aneurysm Paternal Grandmother    • COPD Paternal Grandmother    • Heart attack Paternal Grandfather          Medications have been verified.         Objective /92   Pulse (!) 130   Temp 98.3 °F (36.8 °C)   Resp 20   Wt 136 kg (299 lb)   LMP 10/25/2023 (Exact Date)   SpO2 99%   Breastfeeding No   BMI 46.83 kg/m²        Physical Exam     Physical Exam  Vitals and nursing note reviewed. Constitutional:       General: She is awake. She is not in acute distress. Appearance: Normal appearance. She is normal weight. She is not ill-appearing. HENT:      Head: Normocephalic and atraumatic. Right Ear: Tympanic membrane, ear canal and external ear normal.      Left Ear: Tympanic membrane, ear canal and external ear normal.      Nose: Nose normal.      Mouth/Throat:      Mouth: Mucous membranes are moist.      Pharynx: Oropharynx is clear. Eyes:      Extraocular Movements: Extraocular movements intact. Conjunctiva/sclera: Conjunctivae normal.      Pupils: Pupils are equal, round, and reactive to light. Cardiovascular:      Rate and Rhythm: Normal rate and regular rhythm. Pulses: Normal pulses. Heart sounds: Normal heart sounds. Pulmonary:      Effort: Pulmonary effort is normal.      Breath sounds: Normal breath sounds. Abdominal:      General: Abdomen is flat. Bowel sounds are normal.      Palpations: Abdomen is soft. Musculoskeletal:         General: Normal range of motion. Cervical back: Normal range of motion and neck supple. Comments: Palmar and volar aspect of hands have several small red lesions consistent with HFM. Feet:      Comments: Reports feeling uncomfortable but does not have any spots that she has noted yet. Skin:     General: Skin is warm. Capillary Refill: Capillary refill takes less than 2 seconds. Neurological:      General: No focal deficit present. Mental Status: She is alert and oriented to person, place, and time. Psychiatric:         Mood and Affect: Mood normal.         Behavior: Behavior normal. Behavior is cooperative.

## 2023-12-03 LAB — BACTERIA THROAT CULT: NORMAL

## 2023-12-04 LAB — BACTERIA THROAT CULT: NORMAL

## 2023-12-18 ENCOUNTER — TELEPHONE (OUTPATIENT)
Dept: OBGYN CLINIC | Facility: MEDICAL CENTER | Age: 33
End: 2023-12-18

## 2023-12-18 ENCOUNTER — APPOINTMENT (OUTPATIENT)
Dept: LAB | Facility: HOSPITAL | Age: 33
End: 2023-12-18
Payer: COMMERCIAL

## 2023-12-18 DIAGNOSIS — N92.6 MISSED MENSES: Primary | ICD-10-CM

## 2023-12-18 DIAGNOSIS — N92.6 MISSED MENSES: ICD-10-CM

## 2023-12-18 LAB
ABO GROUP BLD: NORMAL
B-HCG SERPL-ACNC: 2913 MIU/ML (ref 0–5)
BLD GP AB SCN SERPL QL: NEGATIVE
PROGEST SERPL-MCNC: 11.4 NG/ML
RH BLD: POSITIVE
SPECIMEN EXPIRATION DATE: NORMAL

## 2023-12-18 PROCEDURE — 84702 CHORIONIC GONADOTROPIN TEST: CPT

## 2023-12-18 PROCEDURE — 84144 ASSAY OF PROGESTERONE: CPT

## 2023-12-18 PROCEDURE — 36415 COLL VENOUS BLD VENIPUNCTURE: CPT

## 2023-12-18 PROCEDURE — 86900 BLOOD TYPING SEROLOGIC ABO: CPT

## 2023-12-18 PROCEDURE — 86850 RBC ANTIBODY SCREEN: CPT

## 2023-12-18 PROCEDURE — 86901 BLOOD TYPING SEROLOGIC RH(D): CPT

## 2023-12-19 ENCOUNTER — TELEPHONE (OUTPATIENT)
Dept: OBGYN CLINIC | Facility: CLINIC | Age: 33
End: 2023-12-19

## 2023-12-19 DIAGNOSIS — N92.6 MISSED MENSES: Primary | ICD-10-CM

## 2023-12-28 ENCOUNTER — HOSPITAL ENCOUNTER (OUTPATIENT)
Dept: ULTRASOUND IMAGING | Facility: HOSPITAL | Age: 33
End: 2023-12-28
Payer: COMMERCIAL

## 2023-12-28 DIAGNOSIS — N92.6 MISSED MENSES: ICD-10-CM

## 2023-12-28 PROCEDURE — 76801 OB US < 14 WKS SINGLE FETUS: CPT

## 2023-12-28 NOTE — RESULT ENCOUNTER NOTE
Please call patient with results. US notable for intrauterine pregnancy, 6w1d by this scan. Needs OB Intake and H&P.

## 2023-12-29 ENCOUNTER — TELEPHONE (OUTPATIENT)
Dept: OBGYN CLINIC | Facility: CLINIC | Age: 33
End: 2023-12-29

## 2023-12-29 ENCOUNTER — OFFICE VISIT (OUTPATIENT)
Dept: FAMILY MEDICINE CLINIC | Facility: CLINIC | Age: 33
End: 2023-12-29
Payer: COMMERCIAL

## 2023-12-29 VITALS
DIASTOLIC BLOOD PRESSURE: 58 MMHG | BODY MASS INDEX: 45.99 KG/M2 | HEIGHT: 67 IN | HEART RATE: 100 BPM | SYSTOLIC BLOOD PRESSURE: 111 MMHG | WEIGHT: 293 LBS | OXYGEN SATURATION: 100 %

## 2023-12-29 DIAGNOSIS — Z34.81 PRENATAL CARE, SUBSEQUENT PREGNANCY, FIRST TRIMESTER: Primary | ICD-10-CM

## 2023-12-29 DIAGNOSIS — Z3A.01 LESS THAN 8 WEEKS GESTATION OF PREGNANCY: ICD-10-CM

## 2023-12-29 DIAGNOSIS — Z00.00 ANNUAL PHYSICAL EXAM: Primary | ICD-10-CM

## 2023-12-29 DIAGNOSIS — Z23 ENCOUNTER FOR IMMUNIZATION: ICD-10-CM

## 2023-12-29 PROBLEM — E66.01 MATERNAL MORBID OBESITY IN THIRD TRIMESTER, ANTEPARTUM (HCC): Status: RESOLVED | Noted: 2022-05-26 | Resolved: 2023-12-29

## 2023-12-29 PROBLEM — O10.919 CHRONIC HYPERTENSION AFFECTING PREGNANCY: Status: RESOLVED | Noted: 2022-06-27 | Resolved: 2023-12-29

## 2023-12-29 PROBLEM — O99.213 MATERNAL MORBID OBESITY IN THIRD TRIMESTER, ANTEPARTUM (HCC): Status: RESOLVED | Noted: 2022-05-26 | Resolved: 2023-12-29

## 2023-12-29 PROCEDURE — 99395 PREV VISIT EST AGE 18-39: CPT | Performed by: NURSE PRACTITIONER

## 2023-12-29 NOTE — PROGRESS NOTES
ADULT ANNUAL PHYSICAL  [facility]    NAME: Zee Bay  AGE: 33 y.o. SEX: female  : 1990     DATE: 2023     Assessment and Plan:     Problem List Items Addressed This Visit        Other    Less than 8 weeks gestation of pregnancy   Other Visit Diagnoses     Annual physical exam    -  Primary    Encounter for immunization              Immunizations and preventive care screenings were discussed with patient today. Appropriate education was printed on patient's after visit summary.    Counseling:  Recent pregnancy      Depression Screening and Follow-up Plan: Patient was screened for depression during today's encounter. They screened negative with a PHQ-2 score of 0.        Return in 1 year (on 2024).     Chief Complaint:     Chief Complaint   Patient presents with   • Care Gap Request     Hepc  Pneumo  Pap - pt is pregnant   Flu - patient is employee had it in oct/nov with [facility]  Dep    • pregnancy      6 weeks pregnant       History of Present Illness:     Adult Annual Physical   Patient here for a comprehensive physical exam. The patient reports     Diet and Physical Activity  Diet/Nutrition: well balanced diet.   Exercise: no formal exercise.      Depression Screening  PHQ-2/9 Depression Screening    Little interest or pleasure in doing things: 0 - not at all  Feeling down, depressed, or hopeless: 0 - not at all  PHQ-2 Score: 0  PHQ-2 Interpretation: Negative depression screen       General Health  Sleep: sleeps well.   Hearing: normal - bilateral.  Vision: no vision problems and wears glasses.   Dental: regular dental visits.       /GYN Health  Follows with gynecology? yes   Last menstrual period: 10/15/2023  Currently pregnant - US showing 6 weeks.   History of STDs?: no.     Advanced Care Planning  Do you have an advanced directive? no  Do you have a durable medical power of ? no     Review of Systems:      Review of Systems   Constitutional: Negative.    HENT: Negative.     Respiratory: Negative.     Cardiovascular: Negative.    Gastrointestinal: Negative.    Neurological: Negative.    All other systems reviewed and are negative.     Past Medical History:     Past Medical History:   Diagnosis Date   • Chronic hypertension affecting pregnancy 6/27/2022   • Gallstones    • Gestational diabetes mellitus (GDM) in third trimester 7/25/2022   • History of COVID-19 March 2021-no hospitalization   • Varicella     vaccine   • Wears glasses       Past Surgical History:     Past Surgical History:   Procedure Laterality Date   • CHOLECYSTECTOMY     • KNEE SURGERY Right    • [place] LAPAROSCOPY SURG CHOLECYSTECTOMY N/A 12/15/2021    Procedure: CHOLECYSTECTOMY LAPAROSCOPIC W/ ROBOTICS;  Surgeon: Shannen New MD;  Location: [facility];  Service: General   • WISDOM TOOTH EXTRACTION        Social History:     Social History     Socioeconomic History   • Marital status: /Civil Union     Spouse name: None   • Number of children: None   • Years of education: None   • Highest education level: None   Occupational History   • None   Tobacco Use   • Smoking status: Never   • Smokeless tobacco: Never   Vaping Use   • Vaping status: Never Used   Substance and Sexual Activity   • Alcohol use: Not Currently     Comment: social   • Drug use: Never   • Sexual activity: Yes     Partners: Male     Birth control/protection: None   Other Topics Concern   • None   Social History Narrative   • None     Social Determinants of Health     Financial Resource Strain: Not on file   Food Insecurity: Not on file   Transportation Needs: Not on file   Physical Activity: Not on file   Stress: Not on file   Social Connections: Not on file   Intimate Partner Violence: Not on file   Housing Stability: Not on file      Family History:     Family History   Problem Relation Age of Onset   • Breast cancer Mother    • Hyperparathyroidism Mother    •  "Hypertension Mother    • Hypertension Father    • Hyperlipidemia Father    • Diabetes Maternal Grandmother    • Hypertension Maternal Grandmother    • Heart failure Maternal Grandmother    • Heart disease Maternal Grandmother    • Skin cancer Maternal Grandmother    • No Known Problems Maternal Grandfather    • Aneurysm Paternal Grandmother    • COPD Paternal Grandmother    • Heart attack Paternal Grandfather       Current Medications:     Current Outpatient Medications   Medication Sig Dispense Refill   • Prenatal MV & Min w/FA-DHA (ONE A DAY PRENATAL PO)        No current facility-administered medications for this visit.      Allergies:     No Known Allergies   Physical Exam:     /58 (BP Location: Left arm, Patient Position: Sitting, Cuff Size: Large)   Pulse 100   Ht 5' 7\" (1.702 m)   Wt 136 kg (300 lb)   LMP 10/15/2023 (Exact Date)   SpO2 100%   BMI 46.99 kg/m²     Physical Exam  Cardiovascular:      Rate and Rhythm: Normal rate and regular rhythm.      Heart sounds: Normal heart sounds.   Pulmonary:      Effort: Pulmonary effort is normal.      Breath sounds: Normal breath sounds.   Neurological:      Mental Status: She is alert and oriented to person, place, and time.          OPHELIA Smith   [facility]    "

## 2024-01-17 ENCOUNTER — TELEPHONE (OUTPATIENT)
Dept: OBGYN CLINIC | Facility: MEDICAL CENTER | Age: 34
End: 2024-01-17

## 2024-01-17 NOTE — TELEPHONE ENCOUNTER
Patient called and is about 9 weeks pregnant  with Spotting since Saturday. She noticed a quarter sized clot in the toilet after she got up to day and when she wiped it was just light pink on the toilet paper. Spotting has not been enough to reach pad just seeing when she wipes. Advised to monitor bleeding and if she starts bleeding heavy or sees the bleeding change and become bright red to give a call back to the office. Patient communicated that she understood and will call back if anything changes.

## 2024-01-18 ENCOUNTER — TELEPHONE (OUTPATIENT)
Dept: OBGYN CLINIC | Facility: MEDICAL CENTER | Age: 34
End: 2024-01-18

## 2024-01-18 ENCOUNTER — HOSPITAL ENCOUNTER (OUTPATIENT)
Dept: ULTRASOUND IMAGING | Facility: HOSPITAL | Age: 34
End: 2024-01-18
Payer: COMMERCIAL

## 2024-01-18 DIAGNOSIS — O20.9 VAGINAL BLEEDING AFFECTING EARLY PREGNANCY: Primary | ICD-10-CM

## 2024-01-18 DIAGNOSIS — O20.9 VAGINAL BLEEDING AFFECTING EARLY PREGNANCY: ICD-10-CM

## 2024-01-18 DIAGNOSIS — N92.6 MISSED MENSES: ICD-10-CM

## 2024-01-18 PROCEDURE — 76801 OB US < 14 WKS SINGLE FETUS: CPT

## 2024-01-18 NOTE — TELEPHONE ENCOUNTER
Patient called reporting she is still experiencing spotting and passed a few small clots overnight again. Patient states spotting ranges from a pink to a brown coloration and has been going on since Saturday. Denies bright red vaginal bleeding at this time. Patient reports mild cramping. Discussed with provider in office and patient to get STAT ultrasound done. Order placed and patient aware. Patient advised once we have results and they are reviewed by our provider, we will notify her.

## 2024-01-19 ENCOUNTER — TELEPHONE (OUTPATIENT)
Dept: OBGYN CLINIC | Facility: MEDICAL CENTER | Age: 34
End: 2024-01-19

## 2024-01-19 NOTE — TELEPHONE ENCOUNTER
Pt called stating she decided on what she wanted to do but she is expecting a call from the nurse to discuss what she decided.

## 2024-01-19 NOTE — TELEPHONE ENCOUNTER
----- Message from Annie Huang MD sent at 1/19/2024  7:19 AM EST -----  Regarding: miscarriage  I spoke with patient last night about her US which confirmed a nonviable embryo.  We discussed options to wait a week, medications, or D&C.  She was going to think about it. She is not coming in today for OB intake but can one of you call her and touch base, and schedule her with me for next week where-ever makes sense. Ok to overbook, can check with [place] for suggested timing. Thx.

## 2024-01-19 NOTE — TELEPHONE ENCOUNTER
TELEPHONE CALL  [facility]      Delayed entry. I spoke to patient last evening after receiving TigerText from Radiology about missed AB.      We discussed the findings and options for proceeding. Patient is going to take time to process and will follow up.  Our nurse will give her a call today.    Annie Huang MD  [facility]  01/19/24 7:18 AM

## 2024-01-19 NOTE — TELEPHONE ENCOUNTER
Returned patient's call. Patient at this time would like to wait a week and see what her body does. Patient reports started to have some mild cramping and still passing small clots occasionally. Discussed with provider and patient to be scheduled for follow-up in a week. Appointment scheduled. Bleeding precautions reviewed. Patient to call the office with any questions or concerns.

## 2024-01-29 ENCOUNTER — OFFICE VISIT (OUTPATIENT)
Dept: OBGYN CLINIC | Facility: CLINIC | Age: 34
End: 2024-01-29
Payer: COMMERCIAL

## 2024-01-29 VITALS
DIASTOLIC BLOOD PRESSURE: 70 MMHG | WEIGHT: 293 LBS | HEIGHT: 67 IN | BODY MASS INDEX: 45.99 KG/M2 | SYSTOLIC BLOOD PRESSURE: 124 MMHG

## 2024-01-29 DIAGNOSIS — O03.9 COMPLETE MISCARRIAGE: Primary | ICD-10-CM

## 2024-01-29 PROCEDURE — 99213 OFFICE O/P EST LOW 20 MIN: CPT | Performed by: STUDENT IN AN ORGANIZED HEALTH CARE EDUCATION/TRAINING PROGRAM

## 2024-01-29 NOTE — PROGRESS NOTES
"[facility] [address] Debbie James PA    Assessment/Plan:  Zee Bay is a 33 y.o.  who presents for follow up of miscarriage.  US suggests a thin endometrial lining and interval absence of gestational sac and yolk sac confirming complete miscarriage.      Complete miscarriage  - We reviewed today's ultrasound confirming a thin endometrial lining measuring 4 mm with interval absence of a gestational sac and fetal pole, confirming complete miscarriage.  - I encouraged her to stay on her prenatal vitamin. We discussed contraception options. She opts to try to conceive again  in the next few months.    Diagnoses and all orders for this visit:    Complete miscarriage          Subjective:   Zee Bay is a 33 y.o.  female.  CC: miscarriage follow up    HPI: Zee follows up for miscarriage.  She has had heavy bleeding with passage of tissue and clots over the past week, now with minimal dark brown bleeding.           ROS: Review of Systems   Constitutional:  Negative for chills and fever.   Respiratory:  Negative for cough and shortness of breath.    Cardiovascular:  Negative for chest pain and leg swelling.   Gastrointestinal:  Negative for abdominal pain, nausea and vomiting.   Genitourinary:  Negative for dysuria, frequency and urgency.   Neurological:  Negative for dizziness, light-headedness and headaches.       PFSH: The following portions of the patient's history were reviewed and updated as appropriate: allergies, current medications, past family history, past medical history, obstetric history, gynecologic history, past social history, past surgical history and problem list.       Objective:  /70   Ht 5' 7\" (1.702 m)   Wt 136 kg (300 lb)   LMP 10/15/2023 (Exact Date)   BMI 46.99 kg/m²    Physical Exam  Constitutional:       Appearance: Normal appearance.   Cardiovascular:      Rate and Rhythm: Normal rate.   Pulmonary:      Effort: Pulmonary " effort is normal.   Neurological:      Mental Status: She is alert.   Psychiatric:         Mood and Affect: Mood normal.         Behavior: Behavior normal.             Transabdominal US    Interval absence of gestational sac and fetal pole    Endometrial lining 4 mm    Complete miscarriage      Annie Huang MD  [facility]  [facility]  1/29/2024 1:59 PM

## 2024-01-29 NOTE — ASSESSMENT & PLAN NOTE
- We reviewed today's ultrasound confirming a thin endometrial lining measuring 4 mm with interval absence of a gestational sac and fetal pole, confirming complete miscarriage.  - I encouraged her to stay on her prenatal vitamin. We discussed contraception options. She opts to try to conceive again  in the next few months.

## 2024-06-19 ENCOUNTER — APPOINTMENT (OUTPATIENT)
Dept: LAB | Facility: CLINIC | Age: 34
End: 2024-06-19

## 2024-06-19 DIAGNOSIS — Z00.8 ENCOUNTER FOR OTHER GENERAL EXAMINATION: ICD-10-CM

## 2024-06-19 LAB
CHOLEST SERPL-MCNC: 233 MG/DL
EST. AVERAGE GLUCOSE BLD GHB EST-MCNC: 108 MG/DL
HBA1C MFR BLD: 5.4 %
HDLC SERPL-MCNC: 68 MG/DL
LDLC SERPL CALC-MCNC: 138 MG/DL (ref 0–100)
NONHDLC SERPL-MCNC: 165 MG/DL
TRIGL SERPL-MCNC: 137 MG/DL

## 2024-06-19 PROCEDURE — 83036 HEMOGLOBIN GLYCOSYLATED A1C: CPT

## 2024-06-19 PROCEDURE — 36415 COLL VENOUS BLD VENIPUNCTURE: CPT

## 2024-06-19 PROCEDURE — 80061 LIPID PANEL: CPT

## 2024-08-19 ENCOUNTER — TELEPHONE (OUTPATIENT)
Age: 34
End: 2024-08-19

## 2024-08-19 DIAGNOSIS — N92.6 MISSED MENSES: Primary | ICD-10-CM

## 2024-08-19 NOTE — TELEPHONE ENCOUNTER
Pt had miscarriage in 01/2004  LMP 07/11/2024 D&V  scheduled and is requesting Lab work  for HCG  levels

## 2024-08-21 ENCOUNTER — APPOINTMENT (OUTPATIENT)
Dept: LAB | Facility: CLINIC | Age: 34
End: 2024-08-21
Payer: COMMERCIAL

## 2024-08-21 DIAGNOSIS — N92.6 MISSED MENSES: ICD-10-CM

## 2024-08-21 LAB
B-HCG SERPL-ACNC: 506.5 MIU/ML (ref 0–5)
PROGEST SERPL-MCNC: 15.13 NG/ML

## 2024-08-21 PROCEDURE — 84144 ASSAY OF PROGESTERONE: CPT

## 2024-08-21 PROCEDURE — 84702 CHORIONIC GONADOTROPIN TEST: CPT

## 2024-08-21 PROCEDURE — 36415 COLL VENOUS BLD VENIPUNCTURE: CPT

## 2024-08-23 ENCOUNTER — APPOINTMENT (OUTPATIENT)
Dept: LAB | Facility: HOSPITAL | Age: 34
End: 2024-08-23
Payer: COMMERCIAL

## 2024-08-23 ENCOUNTER — TELEPHONE (OUTPATIENT)
Age: 34
End: 2024-08-23

## 2024-08-23 DIAGNOSIS — N92.6 MISSED MENSES: ICD-10-CM

## 2024-08-23 DIAGNOSIS — N92.6 MISSED MENSES: Primary | ICD-10-CM

## 2024-08-23 LAB — B-HCG SERPL-ACNC: 2120.4 MIU/ML (ref 0–5)

## 2024-08-23 PROCEDURE — 84702 CHORIONIC GONADOTROPIN TEST: CPT

## 2024-08-23 PROCEDURE — 36415 COLL VENOUS BLD VENIPUNCTURE: CPT

## 2024-08-23 NOTE — TELEPHONE ENCOUNTER
Patient called is aware of [facility] lab results and  on 8/22.      Please place labs so she can repeat to go in today.

## 2024-08-23 NOTE — TELEPHONE ENCOUNTER
Outgoing call to patient. Informed HCG ordered for repeat. Ordered per result note from Dr. Padilla.

## 2024-08-27 ENCOUNTER — TELEPHONE (OUTPATIENT)
Age: 34
End: 2024-08-27

## 2024-08-27 NOTE — TELEPHONE ENCOUNTER
Pt returned call to review lab results. Results read verbatim with pt verbalizing understanding that HcG levels have increased appropriately. No further questions for CTS. It appears pt already has D&V appt scheduled for 9/20, confirmed appt.

## 2024-08-27 NOTE — TELEPHONE ENCOUNTER
----- Message from Shelbie Padilla MD sent at 8/27/2024  9:00 AM EDT -----  Please call Zee and advise results of hCG have increase appropriately. Recommend setting up dating ultrasound.   Shelbie Padilla MD

## 2024-09-20 ENCOUNTER — ULTRASOUND (OUTPATIENT)
Dept: OBGYN CLINIC | Facility: CLINIC | Age: 34
End: 2024-09-20
Payer: COMMERCIAL

## 2024-09-20 VITALS
DIASTOLIC BLOOD PRESSURE: 80 MMHG | HEIGHT: 67 IN | WEIGHT: 280.6 LBS | BODY MASS INDEX: 44.04 KG/M2 | SYSTOLIC BLOOD PRESSURE: 116 MMHG

## 2024-09-20 DIAGNOSIS — Z36.9 FIRST TRIMESTER SCREENING: Primary | ICD-10-CM

## 2024-09-20 DIAGNOSIS — Z32.01 POSITIVE PREGNANCY TEST: ICD-10-CM

## 2024-09-20 PROCEDURE — 99214 OFFICE O/P EST MOD 30 MIN: CPT | Performed by: OBSTETRICS & GYNECOLOGY

## 2024-09-20 PROCEDURE — 76817 TRANSVAGINAL US OBSTETRIC: CPT | Performed by: OBSTETRICS & GYNECOLOGY

## 2024-09-20 NOTE — PROGRESS NOTES
"Pregnancy Confirmation Visit  [facility]  [address], [address], [place]    Assessment/Plan:  34 y.o.  presenting with missed menses.  Viable pregnancy 10w1d by LMP consistent with ultrasound today.  - Continue/start prenatal vitamin  - Schedule prenatal nursing Intake in 2 weeks  - Initial prenatal visit in 4 weeks    Subjective:    CC: Missed period    Zee Bay is a 34 y.o.  who presents with missed menses.  LMP Patient's last menstrual period was 2024 (exact date)..      Patient notes that this pregnancy was planned and desired.  She was not using contraception at the time of conception. She reports she is certain of her LMP and that she has 33 day cycles . She has has no vaginal bleeding since her LMP. According to her tracking of her cycle and pregnancy tests, she measured 9 w pregnant    Objective:  /80   Ht 5' 7\" (1.702 m)   Wt 127 kg (280 lb 9.6 oz)   LMP 2024 (Exact Date)   BMI 43.95 kg/m²     Physical Exam:  General: Well appearing, no distress  CV: Regular rate  Respiratory: Unlabored breathing  Abdomen: Soft, nontender  Extremities: Without edema  Mood and Affect: Appropriate    Transvaginal Pelvic Ultrasound  Sam IUP  Yolk sac: Present  Fetal Pole: Present  CRL consistent with EGA 9w1d  Cardiac activity: Present   bpm  No adnexal masses appreciated  "

## 2024-10-04 ENCOUNTER — INITIAL PRENATAL (OUTPATIENT)
Dept: OBGYN CLINIC | Facility: MEDICAL CENTER | Age: 34
End: 2024-10-04

## 2024-10-04 VITALS
SYSTOLIC BLOOD PRESSURE: 126 MMHG | DIASTOLIC BLOOD PRESSURE: 84 MMHG | HEIGHT: 67 IN | BODY MASS INDEX: 44.45 KG/M2 | WEIGHT: 283.2 LBS

## 2024-10-04 DIAGNOSIS — Z86.32 HISTORY OF GESTATIONAL DIABETES MELLITUS (GDM) IN PRIOR PREGNANCY, CURRENTLY PREGNANT: ICD-10-CM

## 2024-10-04 DIAGNOSIS — Z34.81 PRENATAL CARE, SUBSEQUENT PREGNANCY, FIRST TRIMESTER: Primary | ICD-10-CM

## 2024-10-04 DIAGNOSIS — O09.299 HISTORY OF GESTATIONAL DIABETES MELLITUS (GDM) IN PRIOR PREGNANCY, CURRENTLY PREGNANT: ICD-10-CM

## 2024-10-04 DIAGNOSIS — Z86.79 HISTORY OF CHRONIC HYPERTENSION: ICD-10-CM

## 2024-10-04 PROCEDURE — OBC

## 2024-10-04 NOTE — PROGRESS NOTES
OB INTAKE INTERVIEW 2024    Patient is 34 y.o. who presents for OB intake at 11w1d.  She is accompanied by her spouse during this encounter.  The father of her baby (Johnny) is involved in the pregnancy.      Patient's last menstrual period was 2024 (approximate).  Ultrasound: Measured 9 weeks 1 days on 24  Estimated Date of Delivery: 25 changed by dating ultrasound.    Signs/Symptoms of Pregnancy  Current pregnancy symptoms: breast tenderness, fatigue, and nausea  Constipation no  Headaches YES  Cramping/spotting no  PICA cravings no    Diabetes-  Body mass index is 44.36 kg/m².  If patient has 1 or more, please order early 1 hour GTT  History of GDM YES  BMI >35 YES  History of PCOS or current metformin use no  History of LGA/macrosomic infant (4000g/9lbs) no    If patient has 2 or more, please order early 1 hour GTT  BMI>30 YES  AMA no  First degree relative with type 2 diabetes no  History of chronic HTN, hyperlipidemia, elevated A1C YES  High risk race (, , ,  or ) no    Hypertension- if you answer yes to any of the following, please order baseline preeclampsia labs (cbc, comprehensive metabolic panel, urine protein creatinine ratio, uric acid)  History of of chronic HTN YES  History of gestational HTN no  History of preeclampsia, eclampsia, or HELLP syndrome YES - superimposed pre-eclampsia w. SF in    History of diabetes no  History of lupus, autoimmune disease, kidney disease no    Thyroid- if yes order TSH with reflex T4  History of thyroid disease no    Bleeding Disorder or Hx of DVT-patient or first degree relative with history of. Order the following if not done previously.   (Factor V, antithrombin III, prothrombin gene mutation, protein C and S Ag, lupus anticoagulant, anticardiolipin, beta-2 glycoprotein)   no    OB/GYN-  History of abnormal pap smear no       Date of last pap smear 2020  History  of HPV no  History of Herpes/HSV no  History of other STI (gonorrhea, chlamydia, trich) no  History of prior  YES  History of prior  no  History of  delivery prior to 36 weeks 6 days no  History of blood transfusion no  Ok for blood transfusion YES    Substance screening-   History of tobacco use no  Currently using tobacco no  Currently using alcohol no  Presently using drugs no  Past drug use  no  IV drug use- no  Partner drug use no  Parent/Family drug use no  Substance Use Screen Level - no risk     MRSA Screening-   Does the pt have a hx of MRSA? no    Immunizations:  Influenza vaccine given this season NO - Will get at work.  Discussed Tdap vaccine YES   COVID Vaccine x2    Genetic/[facility]-  Do you or your partner have a history of any of the following in yourselves or first degree relatives?  Cystic fibrosis no  Spinal muscular atrophy no  Hemoglobinopathy/Sickle Cell/Thalassemia no  Fragile X Intellectual Disability no    If no, discuss Carrier Screening being completed once in a lifetime as a standard of care lab test. Place orders for Cystic Fibrosis Gene Test (XDI032) and Spinal Muscular Atrophy DNA ([id number]).  Patient does not desire testing for Cystic Fibrosis and Spinal Muscular Atrophy.  Declined.    Appointment for Nuchal Translucency Ultrasound at [facility] is scheduled for 10/11.    Interview education  [facility] Pregnancy Essentials Book reviewed, discussed and attached to their AVS YES     Prenatal lab work scripts YES    Extra labs ordered: 1 hour GTT, CMP, Protein/creatinine ratio urine, and Uric Acid    Aspirin/Preeclampsia Screen    Risk Level Risk Factor Recommendation   LOW Prior Uncomplicated full-term delivery NO - A1GDM and cHTN with superimposed pre-eclampsia with SF No Aspirin recommendation        MODERATE Nulliparity no Recommend low-dose aspirin if     BMI>30 YES 2 or more moderate risk factors    Family History Preeclampsia (mother/sister) no     35yr old or greater no       or Low Socioeconomic no     IVF Pregnancy  no     Personal History Risks (low birth weight, prior adverse preg outcome, >10yr preg interval) YES - cHTN         HIGH History of Preeclampsia YES Recommend low-dose aspirin if     Multifetal gestation no 1 or more high risk factors    Chronic HTN YES     Type 1 or 2 Diabetes no     Renal Disease no     Autoimmune Disease  no      Contraindications to ASA therapy:  NSAID/ ASA allergy: no  Nasal polyps: no  Asthma with history of ASA induced bronchospasm: no  Relative contraindications:  History of GI bleed: no  Active peptic ulcer disease: no  Severe hepatic dysfunction: no    Patient does meet recommendation to take ASA 162mg during this pregnancy from 12-36wks to lower her risk of preeclampsia.  Instructions given and patient verbalized understanding.    The patient has a history now or in prior pregnancy notable for: chronic HTN with superimposed pre-eclampsia with SF and A1GDM    Details that I feel the provider should be aware of: Zee came in for her OB intake at 11w1d. Patient c/o breast tenderness, fatigue, nausea and occasional headaches. Safe medications to take during pregnancy and warning signs reviewed. Patient with h/o A1GDM and cHTN with superimposed pre-eclampsia with SF. Prenatal panel, early 1 hour glucose, CMP, PC ratio and uric acid ordered. Patient has NT scheduled with MFM on 10/11.    PN1 visit scheduled. The patient was oriented to our practice, the navigator role, reviewed delivering physicians and [facility] for delivery. All questions were answered.    Interviewed by: Dede Connell RN

## 2024-10-11 ENCOUNTER — ROUTINE PRENATAL (OUTPATIENT)
Dept: PERINATAL CARE | Facility: OTHER | Age: 34
End: 2024-10-11
Payer: COMMERCIAL

## 2024-10-11 VITALS
SYSTOLIC BLOOD PRESSURE: 140 MMHG | HEIGHT: 67 IN | BODY MASS INDEX: 44.32 KG/M2 | DIASTOLIC BLOOD PRESSURE: 88 MMHG | HEART RATE: 99 BPM | WEIGHT: 282.4 LBS

## 2024-10-11 DIAGNOSIS — E66.01 OBESITY, CLASS III, BMI 40-49.9 (MORBID OBESITY) (HCC): ICD-10-CM

## 2024-10-11 DIAGNOSIS — O09.891 PREVIOUS PREGNANCY COMPLICATED BY PREGNANCY-INDUCED HYPERTENSION, ANTEPARTUM, FIRST TRIMESTER: ICD-10-CM

## 2024-10-11 DIAGNOSIS — Z3A.12 12 WEEKS GESTATION OF PREGNANCY: Primary | ICD-10-CM

## 2024-10-11 DIAGNOSIS — Z36.82 NUCHAL TRANSLUCENCY OF FETUS ON PRENATAL ULTRASOUND: ICD-10-CM

## 2024-10-11 DIAGNOSIS — Z36.0 ENCOUNTER FOR ANTENATAL SCREENING FOR CHROMOSOMAL ANOMALIES: ICD-10-CM

## 2024-10-11 PROCEDURE — 76801 OB US < 14 WKS SINGLE FETUS: CPT | Performed by: OBSTETRICS & GYNECOLOGY

## 2024-10-11 PROCEDURE — 76813 OB US NUCHAL MEAS 1 GEST: CPT | Performed by: OBSTETRICS & GYNECOLOGY

## 2024-10-11 PROCEDURE — 36415 COLL VENOUS BLD VENIPUNCTURE: CPT | Performed by: OBSTETRICS & GYNECOLOGY

## 2024-10-11 PROCEDURE — 99244 OFF/OP CNSLTJ NEW/EST MOD 40: CPT | Performed by: OBSTETRICS & GYNECOLOGY

## 2024-10-11 NOTE — PROGRESS NOTES
Patient chose to have [facility] [id number] Non-Invasive Prenatal Screen [id number] PLUS w/ SCA, WITH fetal sex.  Patient choose to be billed through insurance.     Patient given brochure and is aware [facility] will contact patient's insurance and coordinate coverage.  Provided [facility] contact information. General inquiries [phone], Cost estimates [phone] and [facility] Billing [phone]. Website [email or web address].     Blood collection tubes labeled with patient identifiers (name, medical record number, and date of birth).     Filled out [facility] order form. Patient chose to have blood drawn in our office at time of visit. NIPS was drawn from right arm with a straight needle by Ester KAPADIA .      Maternal Fetal Medicine will have results in approximately 5-7 business days and will call patient or notify via [employer].  Patient aware viewing lab result online will reveal fetal sex if ordered.    Patient verbalized understanding of all instructions and no questions at this time.

## 2024-10-11 NOTE — LETTER
October 12, 2024     Shelbie Padilla MD  [address] [place] [address]  [address] [place] [address]    Patient: Zee Bay   YOB: 1990   Date of Visit: 10/11/2024       Dear Dr. Padilla:    Thank you for referring Zee Bay to me for evaluation. Below are my notes for this consultation.    If you have questions, please do not hesitate to call me. I look forward to following your patient along with you.         Sincerely,        Cesar Kinsey MD        CC: No Recipients    Cesar Kinsey MD  10/12/2024  2:38 PM  Sign when Signing Visit  A fetal ultrasound was completed. See Ob procedures in Epic for an interpretation and recommendations. Do not hesitate to contact us in [facility] if you have questions.    Cesar Kinsey MD, MSCE  Maternal Fetal Medicine

## 2024-10-12 NOTE — PROGRESS NOTES
A fetal ultrasound was completed. See Ob procedures in Epic for an interpretation and recommendations. Do not hesitate to contact us in [facility] if you have questions.    Cesar Kinsey MD, MSCE  Maternal Fetal Medicine

## 2024-10-16 ENCOUNTER — APPOINTMENT (OUTPATIENT)
Dept: LAB | Facility: CLINIC | Age: 34
End: 2024-10-16
Payer: COMMERCIAL

## 2024-10-16 DIAGNOSIS — Z34.81 PRENATAL CARE, SUBSEQUENT PREGNANCY, FIRST TRIMESTER: ICD-10-CM

## 2024-10-16 DIAGNOSIS — Z86.79 HISTORY OF CHRONIC HYPERTENSION: ICD-10-CM

## 2024-10-16 DIAGNOSIS — O09.299 HISTORY OF GESTATIONAL DIABETES MELLITUS (GDM) IN PRIOR PREGNANCY, CURRENTLY PREGNANT: ICD-10-CM

## 2024-10-16 DIAGNOSIS — Z86.32 HISTORY OF GESTATIONAL DIABETES MELLITUS (GDM) IN PRIOR PREGNANCY, CURRENTLY PREGNANT: ICD-10-CM

## 2024-10-16 LAB
ABO GROUP BLD: NORMAL
ALBUMIN SERPL BCG-MCNC: 4 G/DL (ref 3.5–5)
ALP SERPL-CCNC: 64 U/L (ref 34–104)
ALT SERPL W P-5'-P-CCNC: 25 U/L (ref 7–52)
ANION GAP SERPL CALCULATED.3IONS-SCNC: 12 MMOL/L (ref 4–13)
AST SERPL W P-5'-P-CCNC: 18 U/L (ref 13–39)
BACTERIA UR QL AUTO: ABNORMAL /HPF
BASOPHILS # BLD AUTO: 0.03 THOUSANDS/ΜL (ref 0–0.1)
BASOPHILS NFR BLD AUTO: 0 % (ref 0–1)
BILIRUB SERPL-MCNC: 0.39 MG/DL (ref 0.2–1)
BILIRUB UR QL STRIP: NEGATIVE
BLD GP AB SCN SERPL QL: NEGATIVE
BUN SERPL-MCNC: 7 MG/DL (ref 5–25)
CALCIUM SERPL-MCNC: 8.9 MG/DL (ref 8.4–10.2)
CFDNA.FET/CFDNA.TOTAL SFR FETUS: NORMAL %
CHLORIDE SERPL-SCNC: 104 MMOL/L (ref 96–108)
CITATION REF LAB TEST: NORMAL
CLARITY UR: ABNORMAL
CO2 SERPL-SCNC: 22 MMOL/L (ref 21–32)
COLOR UR: YELLOW
CREAT SERPL-MCNC: 0.48 MG/DL (ref 0.6–1.3)
CREAT UR-MCNC: 142 MG/DL
EOSINOPHIL # BLD AUTO: 0.15 THOUSAND/ΜL (ref 0–0.61)
EOSINOPHIL NFR BLD AUTO: 1 % (ref 0–6)
ERYTHROCYTE [DISTWIDTH] IN BLOOD BY AUTOMATED COUNT: 13.7 % (ref 11.6–15.1)
FET 13+18+21+X+Y ANEUP PLAS.CFDNA: NEGATIVE
FET CHR 21 TS PLAS.CFDNA QL: NEGATIVE
FET CHR 21 TS PLAS.CFDNA QL: NEGATIVE
FET MS X RISK WBC.DNA+CFDNA QL: NOT DETECTED
FET SEX PLAS.CFDNA DOSAGE CFDNA: NORMAL
FET TS 13 RISK PLAS.CFDNA QL: NEGATIVE
FET X + Y ANEUP RISK PLAS.CFDNA SEQ-IMP: NOT DETECTED
GA EST FROM CONCEPTION DATE: NORMAL D
GESTATIONAL AGE > 9:: YES
GFR SERPL CREATININE-BSD FRML MDRD: 129 ML/MIN/1.73SQ M
GLUCOSE 1H P 50 G GLC PO SERPL-MCNC: 152 MG/DL (ref 70–134)
GLUCOSE P FAST SERPL-MCNC: 80 MG/DL (ref 65–99)
GLUCOSE UR STRIP-MCNC: NEGATIVE MG/DL
HCT VFR BLD AUTO: 38.4 % (ref 34.8–46.1)
HGB BLD-MCNC: 12.7 G/DL (ref 11.5–15.4)
HGB UR QL STRIP.AUTO: NEGATIVE
IMM GRANULOCYTES # BLD AUTO: 0.06 THOUSAND/UL (ref 0–0.2)
IMM GRANULOCYTES NFR BLD AUTO: 1 % (ref 0–2)
KETONES UR STRIP-MCNC: NEGATIVE MG/DL
LAB DIRECTOR NAME PROVIDER: NORMAL
LAB DIRECTOR NAME PROVIDER: NORMAL
LABORATORY COMMENT REPORT: NORMAL
LEUKOCYTE ESTERASE UR QL STRIP: NEGATIVE
LIMITATIONS OF THE TEST: NORMAL
LYMPHOCYTES # BLD AUTO: 2 THOUSANDS/ΜL (ref 0.6–4.47)
LYMPHOCYTES NFR BLD AUTO: 19 % (ref 14–44)
MCH RBC QN AUTO: 29.7 PG (ref 26.8–34.3)
MCHC RBC AUTO-ENTMCNC: 33.1 G/DL (ref 31.4–37.4)
MCV RBC AUTO: 90 FL (ref 82–98)
MONOCYTES # BLD AUTO: 0.43 THOUSAND/ΜL (ref 0.17–1.22)
MONOCYTES NFR BLD AUTO: 4 % (ref 4–12)
MUCOUS THREADS UR QL AUTO: ABNORMAL
NEGATIVE PREDICTIVE VALUE: NORMAL
NEUTROPHILS # BLD AUTO: 8.14 THOUSANDS/ΜL (ref 1.85–7.62)
NEUTS SEG NFR BLD AUTO: 75 % (ref 43–75)
NITRITE UR QL STRIP: NEGATIVE
NON-SQ EPI CELLS URNS QL MICRO: ABNORMAL /HPF
NRBC BLD AUTO-RTO: 0 /100 WBCS
PERFORMANCE CHARACTERISTICS: NORMAL
PH UR STRIP.AUTO: 5.5 [PH]
PLATELET # BLD AUTO: 295 THOUSANDS/UL (ref 149–390)
PMV BLD AUTO: 9.4 FL (ref 8.9–12.7)
POSITIVE PREDICTIVE VALUE: NORMAL
POTASSIUM SERPL-SCNC: 3.9 MMOL/L (ref 3.5–5.3)
PROT SERPL-MCNC: 7.7 G/DL (ref 6.4–8.4)
PROT UR STRIP-MCNC: ABNORMAL MG/DL
PROT UR-MCNC: 22.6 MG/DL
PROT/CREAT UR: 0.2 MG/G{CREAT} (ref 0–0.1)
RBC # BLD AUTO: 4.28 MILLION/UL (ref 3.81–5.12)
RBC #/AREA URNS AUTO: ABNORMAL /HPF
REF LAB TEST METHOD: NORMAL
RH BLD: POSITIVE
RUBV IGG SERPL IA-ACNC: 43.5 IU/ML
SL AMB NOTE:: NORMAL
SODIUM SERPL-SCNC: 138 MMOL/L (ref 135–147)
SP GR UR STRIP.AUTO: 1.02 (ref 1–1.03)
SPECIMEN EXPIRATION DATE: NORMAL
TEST PERFORMANCE INFO SPEC: NORMAL
URATE SERPL-MCNC: 4.8 MG/DL (ref 2–7.5)
UROBILINOGEN UR STRIP-ACNC: <2 MG/DL
WBC # BLD AUTO: 10.81 THOUSAND/UL (ref 4.31–10.16)
WBC #/AREA URNS AUTO: ABNORMAL /HPF

## 2024-10-16 PROCEDURE — 86901 BLOOD TYPING SEROLOGIC RH(D): CPT

## 2024-10-16 PROCEDURE — 82950 GLUCOSE TEST: CPT

## 2024-10-16 PROCEDURE — 86780 TREPONEMA PALLIDUM: CPT

## 2024-10-16 PROCEDURE — 82570 ASSAY OF URINE CREATININE: CPT

## 2024-10-16 PROCEDURE — 87340 HEPATITIS B SURFACE AG IA: CPT

## 2024-10-16 PROCEDURE — 86706 HEP B SURFACE ANTIBODY: CPT

## 2024-10-16 PROCEDURE — 87086 URINE CULTURE/COLONY COUNT: CPT

## 2024-10-16 PROCEDURE — 36415 COLL VENOUS BLD VENIPUNCTURE: CPT

## 2024-10-16 PROCEDURE — 81001 URINALYSIS AUTO W/SCOPE: CPT

## 2024-10-16 PROCEDURE — 84550 ASSAY OF BLOOD/URIC ACID: CPT

## 2024-10-16 PROCEDURE — 86900 BLOOD TYPING SEROLOGIC ABO: CPT

## 2024-10-16 PROCEDURE — 84156 ASSAY OF PROTEIN URINE: CPT

## 2024-10-16 PROCEDURE — 80053 COMPREHEN METABOLIC PANEL: CPT

## 2024-10-16 PROCEDURE — 86803 HEPATITIS C AB TEST: CPT

## 2024-10-16 PROCEDURE — 87389 HIV-1 AG W/HIV-1&-2 AB AG IA: CPT

## 2024-10-16 PROCEDURE — 86850 RBC ANTIBODY SCREEN: CPT

## 2024-10-16 PROCEDURE — 86762 RUBELLA ANTIBODY: CPT

## 2024-10-16 PROCEDURE — 85025 COMPLETE CBC W/AUTO DIFF WBC: CPT

## 2024-10-16 NOTE — RESULT ENCOUNTER NOTE
I have reviewed the patient's lab results which are normal.  Please contact the patient to inform her of the normal results, unless Ms Bay has already reviewed the results using [employer] Kinsey

## 2024-10-17 PROBLEM — O99.210 OBESITY IN PREGNANCY: Status: ACTIVE | Noted: 2024-10-17

## 2024-10-17 PROBLEM — Z3A.13 13 WEEKS GESTATION OF PREGNANCY: Status: ACTIVE | Noted: 2023-12-29

## 2024-10-17 PROBLEM — Z86.32 HISTORY OF GESTATIONAL DIABETES IN PRIOR PREGNANCY, CURRENTLY PREGNANT IN FIRST TRIMESTER: Status: ACTIVE | Noted: 2022-07-25

## 2024-10-17 PROBLEM — O09.291 HISTORY OF GESTATIONAL DIABETES IN PRIOR PREGNANCY, CURRENTLY PREGNANT IN FIRST TRIMESTER: Status: ACTIVE | Noted: 2022-07-25

## 2024-10-17 PROBLEM — O10.919 CHRONIC HYPERTENSION IN PREGNANCY: Status: ACTIVE | Noted: 2024-10-17

## 2024-10-17 PROBLEM — O99.810 ABNORMAL GLUCOSE TOLERANCE TEST (GTT) DURING PREGNANCY, ANTEPARTUM: Status: ACTIVE | Noted: 2024-10-17

## 2024-10-17 LAB
BACTERIA UR CULT: NORMAL
HBV SURFACE AB SER-ACNC: 59.4 MIU/ML
HBV SURFACE AG SER QL: NORMAL
HCV AB SER QL: NORMAL
HIV 1+2 AB+HIV1 P24 AG SERPL QL IA: NORMAL
HIV 2 AB SERPL QL IA: NORMAL
HIV1 AB SERPL QL IA: NORMAL
HIV1 P24 AG SERPL QL IA: NORMAL
TREPONEMA PALLIDUM IGG+IGM AB [PRESENCE] IN SERUM OR PLASMA BY IMMUNOASSAY: NORMAL

## 2024-10-17 NOTE — PROGRESS NOTES
Prenatal H&P     Denies loss of fluid, vaginal discharge, vaginal bleeding  and abdominal pain. Not feeling fetal movement. Tolerating PNV and LDASA.   Prenatal panel and baseline preeclamptic labs reviewed significant for elevated 1 hour GTT.  Met with  center for early ultrasound.  Completed materniT 21 for genetic screening.  Is taking blood pressures twice daily.  104-132/ 64-89.  Planned  pregnancy.     OB history:     G1- 22 Term  male 7lb 6oz; complicated by gestational diabetes and cHTN with superimposed preeclampsia     G2- 24 SAB     G3- current     Dating:     LMP - 24 KENTON 25     US on 24 @  9w 1d KENTON 25  Working KENTON 25     Surgical history:       Past Surgical History:   Procedure Laterality Date    KNEE SURGERY Right     DC LAPAROSCOPY SURG CHOLECYSTECTOMY N/A 12/15/2021    Procedure: CHOLECYSTECTOMY LAPAROSCOPIC W/ ROBOTICS;  Surgeon: Shannen New MD;  Location: [facility] OR;  Service: General    WISDOM TOOTH EXTRACTION         Medical History:  Past Medical History:   Diagnosis Date    History of COVID-19     2021-no hospitalization    Hypertension     Varicella     vaccine    Wears glasses       Social history:     Alcohol/ tobacco/ illicit drug social alcohol use prior to pregnancy/denies drug or tobacco use     Denies history of STD/STI     Work/ housing/ support Acute rehab/ house- stable/ FOB, family and friends supportive     PCP/ Dental last PE 1 year/ last cleaning 2024     NEEMA no risk     She denies a hx of recent cough, chills, fever,  STD/STI, denies a personal history  of TB or Zika virus or close contacts with persons with TB or COVID -19. She denies a family history of inheritable conditions such as physical or intellectual disabilities, birth defects, blood disorders, heart or neural tube defects. She has never had MRSA. She denies recent travel or travel planned in the near future.     Patient Plans   - Feeding breast &  formula   - Contraception pill  - Aware office delivers at [facility]. Reviewed depending on complaint and gestational age may recommend evaluation at [facility]    Plan:  - Continue prenatal vitamin daily  - Genetic screening-completed materniT 21  - [facility]  follow up scheduled 24   -Abnormal 1 hour glucose/history of gestational diabetes.  Hemoglobin A1c ordered.  3-hour fasting GTT ordered  -cHTN reviewed recommendation for low-dose aspirin 162 mg daily.  Baseline labs-WNL.  Blood pressure cuff encouraged bring in to next visit for calibration.  Can continue taking blood pressures twice daily  -  Weight gain in pregnancy-Who BMI recommend 11-20 lb . Healthy diet and daily exercise reviewed and encouraged  - Unit regimen reviewed visit every 4 weeks until 28 weeks, every 2 weeks until 36 weeks and then weekly until delivery.    -Pap smear collected.  Gonorrhea/chlamydia collected.  - Vaccines in Pregnancy      - TDAP vaccine after 27 gestational weeks     - RSV vaccine between 32-36.6 gestational weeks. September- January      - Reviewed with patient  Pregnancy with COVID infection is considered  high risk and can have poor outcome including  delivery, more severe infection.  therefore  pregnant patients are recommended to get  COVID-19 vaccination. Written information provided     - influenza October- March planning to get flu vaccine at work will update EMR at next visit  -  Common discomforts of pregnancy and precautions reviewed.  Signs and symptoms to report reviewed.    RTO 4 weeks offer MSAFP & f/u labs

## 2024-10-17 NOTE — PATIENT INSTRUCTIONS
Medications and Pregnancy  The following list of over-the-counter medications is usually considered safe to take during pregnancy. Take care to not double up on products containing acetaminophen (Tylenol).   Colds/Sore Throat  Robitussin DM - Plain (guaifenesin)  Saline nasal spray  Warm salt water gargle  Cepacol throat lozenges or mouthwash (cetylpyridinium)  Sucrets (hexylresoricinol)  Allergy  AVOID the “D” - or DECONGESTANT  Claritin (loratadine)  Zrytec (cetirizine)  Allerga (fexofenadine)  Headaches / Aches and Pains  Tylenol (acetaminophen)  Do NOT exceed more than 3000 mg of Tylenol in a 24-hour period.  Heartburn  Mylanta (aluminum hydroxide/simethicone, magnesium hydroxide)  Maalaox (aluminum magnesium hydroxide, magnesium hydroxide)  Tums (calcium carbonate)  Riopan (magaldrate)  Constipation  Colace (docusate sodium)  Surfak (docusate sodium)  MiraLAX  Glycerin suppositories  Fleets enema (sodium phosphate & sodium biphosphate)  Nausea/Vomiting  Vitamin B6 (pyridoxine) - May take 50 mg at bedtime, 25 mg in the morning, 25 mg in the afternoon  Unisom (doxylamine) - May use for nausea/vomiting - (cut a 25 mg tablet in half). May cause drowsiness.   Sleep  Benadryl (diphenhydramine) - Take 1-2 tablets as needed at bedtime  Unisom (doxylamine) 25 mg tablet - As needed at bedtime  Melatonin 5 mg tablet - As needed at bedtime    Generally the generic form of medicine is usually lower priced than the brand name form of the medicine.   Talk to your healthcare provider before you take any medicines.  Many medicines may harm your baby if you take them when you are pregnant. Do not take any medicines, vitamins, herbs, or supplements without first talking to your healthcare provider.      Dr Russo

## 2024-10-18 ENCOUNTER — TELEPHONE (OUTPATIENT)
Age: 34
End: 2024-10-18

## 2024-10-18 DIAGNOSIS — R73.09 ELEVATED GLUCOSE TOLERANCE TEST: Primary | ICD-10-CM

## 2024-10-18 NOTE — TELEPHONE ENCOUNTER
----- Message from Shelbie Padilla MD sent at 10/18/2024  4:02 PM EDT -----  Please call Zee and advise results reviewed. Her glucose is elevated. She needs a 3 hour glucola everything else looks normal.    Shelbie Padilla MD

## 2024-10-21 ENCOUNTER — INITIAL PRENATAL (OUTPATIENT)
Dept: OBGYN CLINIC | Facility: CLINIC | Age: 34
End: 2024-10-21

## 2024-10-21 VITALS — WEIGHT: 281.8 LBS | BODY MASS INDEX: 44.14 KG/M2 | SYSTOLIC BLOOD PRESSURE: 130 MMHG | DIASTOLIC BLOOD PRESSURE: 82 MMHG

## 2024-10-21 DIAGNOSIS — O09.291 HISTORY OF GESTATIONAL DIABETES IN PRIOR PREGNANCY, CURRENTLY PREGNANT IN FIRST TRIMESTER: Primary | ICD-10-CM

## 2024-10-21 DIAGNOSIS — Z11.3 SCREENING EXAMINATION FOR STI: ICD-10-CM

## 2024-10-21 DIAGNOSIS — Z3A.13 13 WEEKS GESTATION OF PREGNANCY: ICD-10-CM

## 2024-10-21 DIAGNOSIS — O99.810 ABNORMAL GLUCOSE TOLERANCE TEST (GTT) DURING PREGNANCY, ANTEPARTUM: ICD-10-CM

## 2024-10-21 DIAGNOSIS — O99.210 OBESITY IN PREGNANCY: ICD-10-CM

## 2024-10-21 DIAGNOSIS — O10.919 CHRONIC HYPERTENSION IN PREGNANCY: ICD-10-CM

## 2024-10-21 DIAGNOSIS — Z86.32 HISTORY OF GESTATIONAL DIABETES IN PRIOR PREGNANCY, CURRENTLY PREGNANT IN FIRST TRIMESTER: Primary | ICD-10-CM

## 2024-10-21 DIAGNOSIS — Z12.4 SCREENING FOR CERVICAL CANCER: ICD-10-CM

## 2024-10-21 PROCEDURE — PNV: Performed by: NURSE PRACTITIONER

## 2024-10-21 PROCEDURE — G0476 HPV COMBO ASSAY CA SCREEN: HCPCS | Performed by: NURSE PRACTITIONER

## 2024-10-21 PROCEDURE — 87591 N.GONORRHOEAE DNA AMP PROB: CPT | Performed by: NURSE PRACTITIONER

## 2024-10-21 PROCEDURE — 87491 CHLMYD TRACH DNA AMP PROBE: CPT | Performed by: NURSE PRACTITIONER

## 2024-10-21 PROCEDURE — G0145 SCR C/V CYTO,THINLAYER,RESCR: HCPCS | Performed by: NURSE PRACTITIONER

## 2024-10-21 RX ORDER — ASPIRIN 81 MG/1
81 TABLET ORAL 2 TIMES DAILY
COMMUNITY

## 2024-10-22 LAB
C TRACH DNA SPEC QL NAA+PROBE: NEGATIVE
HPV HR 12 DNA CVX QL NAA+PROBE: NEGATIVE
HPV16 DNA CVX QL NAA+PROBE: NEGATIVE
HPV18 DNA CVX QL NAA+PROBE: NEGATIVE
N GONORRHOEA DNA SPEC QL NAA+PROBE: NEGATIVE

## 2024-10-25 LAB
LAB AP GYN PRIMARY INTERPRETATION: NORMAL
LAB AP LMP: NORMAL
Lab: NORMAL
PATH INTERP SPEC-IMP: NORMAL

## 2024-11-14 ENCOUNTER — TELEPHONE (OUTPATIENT)
Dept: OBGYN CLINIC | Facility: MEDICAL CENTER | Age: 34
End: 2024-11-14

## 2024-11-14 ENCOUNTER — PATIENT MESSAGE (OUTPATIENT)
Dept: OBGYN CLINIC | Facility: MEDICAL CENTER | Age: 34
End: 2024-11-14

## 2024-11-14 DIAGNOSIS — Z3A.17 17 WEEKS GESTATION OF PREGNANCY: Primary | ICD-10-CM

## 2024-11-14 NOTE — TELEPHONE ENCOUNTER
OB nurse navigator attempted to get in contact with patient for 2nd Trimester call but unable to complete call at this time. Patient unavailable. Left message on vm to return call. Wisrt message sent.

## 2024-11-18 NOTE — PATIENT COMMUNICATION
2ND TRIMESTER CHECK-IN    Overall how are you doing? Patient reports to be doing well and has no current complains at this time.      Compliant with routine OB care appointments? Yes.     Have you completed your 1st trimester labs? Yes.     If you had NIPS with MFM, do you have a order for MSAFP? Order placed. Time frame reviewed.      Have you seen MFM and do you have your detailed US scheduled? Patient has anatomy scan scheduled 12/9.     Pregnancy Education-have you had a chance to review the classes offered and registered? Patient interested. Registration link sent via [employer].

## 2024-11-19 ENCOUNTER — APPOINTMENT (OUTPATIENT)
Dept: LAB | Facility: CLINIC | Age: 34
End: 2024-11-19
Payer: COMMERCIAL

## 2024-11-19 DIAGNOSIS — Z3A.17 17 WEEKS GESTATION OF PREGNANCY: ICD-10-CM

## 2024-11-19 PROCEDURE — 36415 COLL VENOUS BLD VENIPUNCTURE: CPT

## 2024-11-19 PROCEDURE — 82105 ALPHA-FETOPROTEIN SERUM: CPT

## 2024-11-21 ENCOUNTER — RESULTS FOLLOW-UP (OUTPATIENT)
Dept: OBGYN CLINIC | Facility: MEDICAL CENTER | Age: 34
End: 2024-11-21

## 2024-11-21 LAB
2ND TRIMESTER 4 SCREEN SERPL-IMP: NORMAL
AFP ADJ MOM SERPL: 1.47
AFP INTERP AMN-IMP: NORMAL
AFP INTERP SERPL-IMP: NORMAL
AFP INTERP SERPL-IMP: NORMAL
AFP SERPL-MCNC: 41.9 NG/ML
AGE AT DELIVERY: 33.7 YR
GA METHOD: NORMAL
GA: 17.7 WEEKS
IDDM PATIENT QL: NO
MULTIPLE PREGNANCY: NO
NEURAL TUBE DEFECT RISK FETUS: 2965 %

## 2024-11-22 ENCOUNTER — ROUTINE PRENATAL (OUTPATIENT)
Dept: OBGYN CLINIC | Facility: CLINIC | Age: 34
End: 2024-11-22

## 2024-11-22 VITALS — WEIGHT: 282 LBS | DIASTOLIC BLOOD PRESSURE: 90 MMHG | BODY MASS INDEX: 44.17 KG/M2 | SYSTOLIC BLOOD PRESSURE: 130 MMHG

## 2024-11-22 DIAGNOSIS — O10.919 CHRONIC HYPERTENSION IN PREGNANCY: ICD-10-CM

## 2024-11-22 DIAGNOSIS — Z34.82 ENCOUNTER FOR SUPERVISION OF OTHER NORMAL PREGNANCY IN SECOND TRIMESTER: Primary | ICD-10-CM

## 2024-11-22 DIAGNOSIS — O09.291 HISTORY OF GESTATIONAL DIABETES IN PRIOR PREGNANCY, CURRENTLY PREGNANT IN FIRST TRIMESTER: ICD-10-CM

## 2024-11-22 DIAGNOSIS — Z86.32 HISTORY OF GESTATIONAL DIABETES IN PRIOR PREGNANCY, CURRENTLY PREGNANT IN FIRST TRIMESTER: ICD-10-CM

## 2024-11-22 PROCEDURE — PNV: Performed by: OBSTETRICS & GYNECOLOGY

## 2024-11-22 RX ORDER — LABETALOL 100 MG/1
100 TABLET, FILM COATED ORAL 2 TIMES DAILY
Qty: 180 TABLET | Refills: 3 | Status: SHIPPED | OUTPATIENT
Start: 2024-11-22

## 2024-11-22 NOTE — PROGRESS NOTES
Routine Prenatal Visit  [facility]  [address], [address], [place]    Assessment/Plan:  Zee is a 34 y.o. year old  at 18w1d who presents for routine prenatal visit.     1. Encounter for supervision of other normal pregnancy in second trimester  2. Chronic hypertension in pregnancy  -     labetalol (NORMODYNE) 100 mg tablet; Take 1 tablet (100 mg total) by mouth 2 (two) times a day  3. History of gestational diabetes in prior pregnancy, currently pregnant in first trimester        Subjective:     CC: Prenatal care    Zee Bay is a 34 y.o.  female who presents for routine prenatal care at 18w1d.  Pregnancy ROS: no leakage of fluid, pelvic pain, or vaginal bleeding.  + fetal movement.  BPs at home have been 110-130/70-90s will start Labetalol    The following portions of the patient's history were reviewed and updated as appropriate: allergies, current medications, past family history, past medical history, obstetric history, gynecologic history, past social history, past surgical history and problem list.      Objective:  /90   Wt 128 kg (282 lb)   LMP 2024 (Approximate)   BMI 44.17 kg/m²   Pregravid Weight/BMI: 126 kg (278 lb) (BMI 43.53)  Current Weight: 128 kg (282 lb)   Total Weight Gain: 1.814 kg (4 lb)   Pre-Yassine Vitals      Flowsheet Row Most Recent Value   Prenatal Assessment    Fetal Heart Rate 150   Prenatal Vitals    Blood Pressure 130/90   Weight - Scale 128 kg (282 lb)   Urine Albumin/Glucose    Dilation/Effacement/Station    Vaginal Drainage    Draining Fluid No   Edema    LLE Edema None   RLE Edema None   Facial Edema None             General: Well appearing, no distress  Respiratory: Unlabored breathing  Cardiovascular: Regular rate.  Abdomen: Soft, gravid, nontender  Fundal Height: Appropriate for gestational age.  Extremities: Warm and well perfused.  Non tender.

## 2024-12-06 PROBLEM — O03.9 COMPLETE MISCARRIAGE: Status: RESOLVED | Noted: 2024-01-29 | Resolved: 2024-12-06

## 2024-12-09 ENCOUNTER — ROUTINE PRENATAL (OUTPATIENT)
Dept: PERINATAL CARE | Facility: OTHER | Age: 34
End: 2024-12-09
Payer: COMMERCIAL

## 2024-12-09 VITALS
HEIGHT: 67 IN | WEIGHT: 284.4 LBS | SYSTOLIC BLOOD PRESSURE: 124 MMHG | HEART RATE: 97 BPM | BODY MASS INDEX: 44.64 KG/M2 | DIASTOLIC BLOOD PRESSURE: 68 MMHG

## 2024-12-09 DIAGNOSIS — O10.912 CHRONIC HYPERTENSION COMPLICATING OR REASON FOR CARE DURING PREGNANCY, SECOND TRIMESTER: Primary | ICD-10-CM

## 2024-12-09 DIAGNOSIS — E66.01 SEVERE OBESITY DUE TO EXCESS CALORIES AFFECTING PREGNANCY IN SECOND TRIMESTER (HCC): ICD-10-CM

## 2024-12-09 DIAGNOSIS — Z86.32 HISTORY OF GESTATIONAL DIABETES IN PRIOR PREGNANCY, CURRENTLY PREGNANT IN FIRST TRIMESTER: ICD-10-CM

## 2024-12-09 DIAGNOSIS — Z36.3 ENCOUNTER FOR ANTENATAL SCREENING FOR MALFORMATIONS: ICD-10-CM

## 2024-12-09 DIAGNOSIS — Z3A.20 20 WEEKS GESTATION OF PREGNANCY: ICD-10-CM

## 2024-12-09 DIAGNOSIS — O99.212 SEVERE OBESITY DUE TO EXCESS CALORIES AFFECTING PREGNANCY IN SECOND TRIMESTER (HCC): ICD-10-CM

## 2024-12-09 DIAGNOSIS — Z36.86 ENCOUNTER FOR ANTENATAL SCREENING FOR CERVICAL LENGTH: ICD-10-CM

## 2024-12-09 DIAGNOSIS — O09.291 HISTORY OF GESTATIONAL DIABETES IN PRIOR PREGNANCY, CURRENTLY PREGNANT IN FIRST TRIMESTER: ICD-10-CM

## 2024-12-09 PROCEDURE — 76811 OB US DETAILED SNGL FETUS: CPT | Performed by: OBSTETRICS & GYNECOLOGY

## 2024-12-09 PROCEDURE — 99214 OFFICE O/P EST MOD 30 MIN: CPT | Performed by: OBSTETRICS & GYNECOLOGY

## 2024-12-09 PROCEDURE — 76817 TRANSVAGINAL US OBSTETRIC: CPT | Performed by: OBSTETRICS & GYNECOLOGY

## 2024-12-09 NOTE — PROGRESS NOTES
Zee Bay  has no complaints today. She is here at 20w4d  for detailed anatomic survey. She reports fetal movements and does not report any vaginal bleeding or signs of labor.  Her recently completed fetal testing revealed a low risk NIPT and negative MSAFP screen (MoM 1.47).  On 10/16/2024 her 1 hour Glucola screen was 152.  On 10/22/2024 she passed a 3-hour glucose tolerance test (3/4 values).      Problem list:  1.  Chronic hypertension on labetalol  2.  Pregravid BMI of 43.5  3.  Prior pregnancy with gestational hypertension     Ultrasound findings:  A viable mcgovern intrauterine pregnancy is seen on today's ultrasound, measuring consistent with established EDC.  The fetal anatomic survey is complete, and no fetal anomalies are suspected.  Amniotic fluid and placenta are within normal limits.  Transvaginal cervical length is within normal limits (5.33 cm), indicating low risk for  birth.     Pregnancy ultrasound has limitations and is unable to detect all forms of fetal congenital abnormalities.       Specific counseling was provided on the following problems:  1.  Her NIPT, MSAFP screen and Glucola testing were reviewed.  2.  We discussed the status of her chronic hypertension.  Her blood pressure is well-controlled on labetalol 100 mg daily.  Delivery timing is recommended at 39 weeks for well-controlled chronic hypertension on medication.  3. Continue aspirin until 36 weeks.  4. Repeat 3 hour GTT between 26 and 28 weeks.     Follow up recommended:   1. Growth ultrasound at 28 weeks   2. Weekly NST and SAMY at 32 weeks for the indication of CHTN on medication.     Split-shared decision-making between OPHELIA Champion and myself was utilized, with the majority of the time spent by KARINA Champion.  Medical decision-making for this encounter was low (diagnosis low, data limited and risk low).     Procedures that were completed today were charged separately.     I reviewed the ultrasound pictures and  recommended the medical decision making transcribed in the care of this patient.        Annetta Tena M.D.

## 2024-12-09 NOTE — PROGRESS NOTES
Ultrasound Probe Disinfection    A transvaginal ultrasound was performed.   Prior to use, disinfection was performed with High Level Disinfection Process ([employer]).  Probe serial number [id number]: [id number]  was used.    Chaperone: Marcos Guillen RDMS

## 2024-12-09 NOTE — LETTER
2024     Josue De La Vega MD  [address] [place] [address]  [address] [place] [address]    Patient: Zee Bay   YOB: 1990   Date of Visit: 2024       Dear Dr. De La Vega:    Thank you for referring Zee Bay to me for evaluation. Below are my notes for this consultation.    If you have questions, please do not hesitate to call me. I look forward to following your patient along with you.         Sincerely,        Annetta Tena MD        CC: No Recipients    Annetta Tena MD  2024  9:43 AM  Sign when Signing Visit  Zee Bay  has no complaints today. She is here at 20w4d  for detailed anatomic survey. She reports fetal movements and does not report any vaginal bleeding or signs of labor.  Her recently completed fetal testing revealed a low risk NIPT and negative MSAFP screen (MoM 1.47).  On 10/16/2024 her 1 hour Glucola screen was 152.  On 10/22/2024 she passed a 3-hour glucose tolerance test (3/4 values).      Problem list:  1.  Chronic hypertension on labetalol  2.  Pregravid BMI of 43.5  3.  Prior pregnancy with gestational hypertension     Ultrasound findings:  A viable mcgovern intrauterine pregnancy is seen on today's ultrasound, measuring consistent with established EDC.  The fetal anatomic survey is complete, and no fetal anomalies are suspected.  Amniotic fluid and placenta are within normal limits.  Transvaginal cervical length is within normal limits (5.33 cm), indicating low risk for  birth.     Pregnancy ultrasound has limitations and is unable to detect all forms of fetal congenital abnormalities.       Specific counseling was provided on the following problems:  1.  Her NIPT, MSAFP screen and Glucola testing were reviewed.  2.  We discussed the status of her chronic hypertension.  Her blood pressure is well-controlled on labetalol 100 mg daily.  Delivery timing is recommended at 39 weeks for well-controlled chronic hypertension on medication.  3.  Continue aspirin until 36 weeks.  4. Repeat 3 hour GTT between 26 and 28 weeks.     Follow up recommended:   1. Growth ultrasound at 28 weeks   2. Weekly NST and SAMY at 32 weeks for the indication of CHTN on medication.     Split-shared decision-making between OPHELIA Champion and myself was utilized, with the majority of the time spent by KARINA Champion.  Medical decision-making for this encounter was low (diagnosis low, data limited and risk low).     Procedures that were completed today were charged separately.     I reviewed the ultrasound pictures and recommended the medical decision making transcribed in the care of this patient.        Annetta Tena M.D.

## 2024-12-12 PROBLEM — Z3A.21 21 WEEKS GESTATION OF PREGNANCY: Status: ACTIVE | Noted: 2023-12-29

## 2024-12-12 NOTE — ASSESSMENT & PLAN NOTE
Baseline labs are normal   Currently on labetalol   Will need testing starting at 34 weeks  Consider delivery 37-39 weeks depending on control

## 2024-12-12 NOTE — PROGRESS NOTES
Routine Prenatal Visit  [facility] [address], [place], [place]      OB/GYN Prenatal Visit    ASSESSMENT / PLAN:  1. 21 weeks gestation of pregnancy  Assessment & Plan:  NIPT and AFP are normal   2. Abnormal glucose tolerance test (GTT) during pregnancy, antepartum  Assessment & Plan:  Passed early 3 hour will need to repeat the 3 hour at 28 weeks   3. Chronic hypertension in pregnancy  Assessment & Plan:  Baseline labs are normal   Currently on labetalol   Will need testing starting at 34 weeks  Consider delivery 37-39 weeks depending on control   4. Severe obesity due to excess calories affecting pregnancy in second trimester (HCC)  Assessment & Plan:  Starting BMI is 43  Will get NST for CHTN   Asa till 36 weeks    5. History of gestational diabetes in prior pregnancy, currently pregnant in first trimester  Assessment & Plan:  Early 3 hour normal repeat at 28 weeks          SUBJECTIVE:  Zee Bay is a 34 y.o.  at 21 here for prenatal visit.  She has no obstetric complaints and denies pelvic pain, cramping/contractions, vaginal bleeding, loss of fluid, or decreased fetal movement.       The following portions of the patient's history were reviewed and updated as appropriate: allergies, current medications, past family history, past medical history, obstetric history, gynecologic history, past social history, past surgical history and problem list.      Objective:  /80   Wt 129 kg (285 lb)   LMP 2024 (Approximate)   BMI 44.64 kg/m²   Pregravid Weight/BMI: 126 kg (278 lb) (BMI 43.53)  Current Weight: 129 kg (285 lb)   Total Weight Gain: 3.175 kg (7 lb)   Pre- Vitals      Flowsheet Row Most Recent Value   Prenatal Assessment    Fetal Heart Rate 146   Movement Present   Prenatal Vitals    Blood Pressure 122/80   Weight - Scale 129 kg (285 lb)   Urine Albumin/Glucose    Dilation/Effacement/Station    Vaginal Drainage    Draining Fluid No   Edema     LLE Edema None   RLE Edema None   Facial Edema None               Physical Exam:    General: Well appearing, no distress  Respiratory: Unlabored breathing  Cardiovascular: Regular rate.  Abdomen: Soft, gravid, nontender  Fundal Height: Appropriate for gestational age.  Extremities: Warm and well perfused.  Non tender.      Josue De La Vega MD

## 2024-12-12 NOTE — ASSESSMENT & PLAN NOTE
Starting BMI is 43  Will get NST for CHTN   Asa till 36 weeks     Alert-The patient is alert, awake and responds to voice. The patient is oriented to time, place, and person. The triage nurse is able to obtain subjective information.

## 2024-12-16 ENCOUNTER — ROUTINE PRENATAL (OUTPATIENT)
Dept: OBGYN CLINIC | Facility: MEDICAL CENTER | Age: 34
End: 2024-12-16

## 2024-12-16 VITALS — BODY MASS INDEX: 44.64 KG/M2 | WEIGHT: 285 LBS | DIASTOLIC BLOOD PRESSURE: 80 MMHG | SYSTOLIC BLOOD PRESSURE: 122 MMHG

## 2024-12-16 DIAGNOSIS — Z86.32 HISTORY OF GESTATIONAL DIABETES IN PRIOR PREGNANCY, CURRENTLY PREGNANT IN FIRST TRIMESTER: ICD-10-CM

## 2024-12-16 DIAGNOSIS — Z3A.21 21 WEEKS GESTATION OF PREGNANCY: Primary | ICD-10-CM

## 2024-12-16 DIAGNOSIS — O09.291 HISTORY OF GESTATIONAL DIABETES IN PRIOR PREGNANCY, CURRENTLY PREGNANT IN FIRST TRIMESTER: ICD-10-CM

## 2024-12-16 DIAGNOSIS — O99.810 ABNORMAL GLUCOSE TOLERANCE TEST (GTT) DURING PREGNANCY, ANTEPARTUM: ICD-10-CM

## 2024-12-16 DIAGNOSIS — O10.919 CHRONIC HYPERTENSION IN PREGNANCY: ICD-10-CM

## 2024-12-16 DIAGNOSIS — O99.212 SEVERE OBESITY DUE TO EXCESS CALORIES AFFECTING PREGNANCY IN SECOND TRIMESTER (HCC): ICD-10-CM

## 2024-12-16 DIAGNOSIS — E66.01 SEVERE OBESITY DUE TO EXCESS CALORIES AFFECTING PREGNANCY IN SECOND TRIMESTER (HCC): ICD-10-CM

## 2024-12-16 PROCEDURE — PNV: Performed by: OBSTETRICS & GYNECOLOGY

## 2025-01-08 ENCOUNTER — CLINICAL SUPPORT (OUTPATIENT)
Age: 35
End: 2025-01-08

## 2025-01-08 DIAGNOSIS — Z32.2 ENCOUNTER FOR CHILDBIRTH INSTRUCTION: Primary | ICD-10-CM

## 2025-01-13 ENCOUNTER — ROUTINE PRENATAL (OUTPATIENT)
Dept: OBGYN CLINIC | Facility: CLINIC | Age: 35
End: 2025-01-13

## 2025-01-13 VITALS — SYSTOLIC BLOOD PRESSURE: 122 MMHG | BODY MASS INDEX: 45.42 KG/M2 | WEIGHT: 290 LBS | DIASTOLIC BLOOD PRESSURE: 84 MMHG

## 2025-01-13 DIAGNOSIS — Z3A.25 25 WEEKS GESTATION OF PREGNANCY: ICD-10-CM

## 2025-01-13 DIAGNOSIS — O99.212 SEVERE OBESITY DUE TO EXCESS CALORIES AFFECTING PREGNANCY IN SECOND TRIMESTER (HCC): ICD-10-CM

## 2025-01-13 DIAGNOSIS — E66.01 SEVERE OBESITY DUE TO EXCESS CALORIES AFFECTING PREGNANCY IN SECOND TRIMESTER (HCC): ICD-10-CM

## 2025-01-13 DIAGNOSIS — Z34.92 SECOND TRIMESTER PREGNANCY: Primary | ICD-10-CM

## 2025-01-13 DIAGNOSIS — O99.810 ABNORMAL GLUCOSE TOLERANCE TEST (GTT) DURING PREGNANCY, ANTEPARTUM: ICD-10-CM

## 2025-01-13 DIAGNOSIS — O09.292 HISTORY OF GESTATIONAL DIABETES IN PRIOR PREGNANCY, CURRENTLY PREGNANT IN SECOND TRIMESTER: ICD-10-CM

## 2025-01-13 DIAGNOSIS — O99.891 BACK PAIN IN PREGNANCY: ICD-10-CM

## 2025-01-13 DIAGNOSIS — Z86.32 HISTORY OF GESTATIONAL DIABETES IN PRIOR PREGNANCY, CURRENTLY PREGNANT IN SECOND TRIMESTER: ICD-10-CM

## 2025-01-13 DIAGNOSIS — M54.9 BACK PAIN IN PREGNANCY: ICD-10-CM

## 2025-01-13 DIAGNOSIS — O10.919 CHRONIC HYPERTENSION IN PREGNANCY: ICD-10-CM

## 2025-01-13 PROCEDURE — PNV: Performed by: OBSTETRICS & GYNECOLOGY

## 2025-01-13 NOTE — PROGRESS NOTES
Assessment  34 y.o.  at 25w4d presenting for routine prenatal visit.     Plan  Diagnoses and all orders for this visit:    Second trimester pregnancy  25 weeks gestation of pregnancy  -     PTL precautions  - Normal movement  - Glucose CAMILO 3HR 100GM PREG PTS; Future  -     CBC and differential; Future  -     RPR-Syphilis Screening (Total Syphilis IGG/IGM); Future  -     Anemia Panel w/Reflex, OB; Future  - Return in 4wk for PN    Chronic hypertension in pregnancy  - Follows with MFM  - On labetalol 100mg BID  - On ASA    Abnormal glucose tolerance test (GTT) during pregnancy, antepartum  History of gestational diabetes in prior pregnancy, currently pregnant in second trimester  Severe obesity affecting pregnancy in second trimester (HCC)  - 3hr GTT ordered  - Abnormal early 1hr    Back pain in pregnancy  -     Elastic Bandages & Supports (Lumbosacral Supp Abdominal XL) MISC; Wear device when active, take off at rest      ____________________________________________________________        Subjective    Zee Bay is a 34 y.o.  at 25w4d who presents for routine prenatal visit. She thinks she had a yeast infection recently, took monistat and resolved. Also with increasing midline back pain, sometimes radiating into thigh. Feels appropriate to her as 3rd tri approaches. Denies contractions, loss of fluid, or vaginal bleeding. She feels regular fetal movements.     Pregnancy Problems:  Patient Active Problem List   Diagnosis    COVID-19 vaccine series completed    History of gestational diabetes in prior pregnancy, currently pregnant in second trimester    25 weeks gestation of pregnancy    Severe obesity due to excess calories affecting pregnancy in second trimester (HCC)    Chronic hypertension in pregnancy    Abnormal glucose tolerance test (GTT) during pregnancy, antepartum         Objective  /84   Wt 132 kg (290 lb)   LMP 2024 (Approximate)   BMI 45.42 kg/m²     FHT: 144 BPM    Uterine Size: size equals dates     Physical Exam:  Physical Exam  Constitutional:       General: She is not in acute distress.     Appearance: Normal appearance. She is well-developed. She is not ill-appearing, toxic-appearing or diaphoretic.   HENT:      Head: Normocephalic and atraumatic.   Eyes:      General: No scleral icterus.        Right eye: No discharge.         Left eye: No discharge.      Conjunctiva/sclera: Conjunctivae normal.   Pulmonary:      Effort: Pulmonary effort is normal. No accessory muscle usage or respiratory distress.   Abdominal:      General: There is distension (gravid).      Tenderness: There is no abdominal tenderness. There is no guarding or rebound.   Skin:     General: Skin is warm and dry.      Coloration: Skin is not jaundiced.      Findings: No bruising, erythema or rash.   Neurological:      Mental Status: She is alert.   Psychiatric:         Mood and Affect: Mood normal.         Behavior: Behavior normal.         Thought Content: Thought content normal.         Judgment: Judgment normal.

## 2025-01-27 ENCOUNTER — RESULTS FOLLOW-UP (OUTPATIENT)
Dept: OBGYN CLINIC | Facility: MEDICAL CENTER | Age: 35
End: 2025-01-27

## 2025-01-27 ENCOUNTER — APPOINTMENT (OUTPATIENT)
Dept: LAB | Facility: HOSPITAL | Age: 35
End: 2025-01-27
Payer: COMMERCIAL

## 2025-01-27 DIAGNOSIS — Z34.92 SECOND TRIMESTER PREGNANCY: ICD-10-CM

## 2025-01-27 DIAGNOSIS — O99.810 ABNORMAL GLUCOSE TOLERANCE TEST (GTT) DURING PREGNANCY, ANTEPARTUM: Primary | ICD-10-CM

## 2025-01-27 DIAGNOSIS — Z3A.25 25 WEEKS GESTATION OF PREGNANCY: ICD-10-CM

## 2025-01-27 LAB
BASOPHILS # BLD AUTO: 0.06 THOUSANDS/ΜL (ref 0–0.1)
BASOPHILS NFR BLD AUTO: 0 % (ref 0–1)
EOSINOPHIL # BLD AUTO: 0.28 THOUSAND/ΜL (ref 0–0.61)
EOSINOPHIL NFR BLD AUTO: 2 % (ref 0–6)
ERYTHROCYTE [DISTWIDTH] IN BLOOD BY AUTOMATED COUNT: 13.4 % (ref 11.6–15.1)
GLUCOSE 1H P 100 G GLC PO SERPL-MCNC: 199 MG/DL (ref 65–179)
GLUCOSE 2H P 100 G GLC PO SERPL-MCNC: 183 MG/DL (ref 65–154)
GLUCOSE 3H P 100 G GLC PO SERPL-MCNC: 56 MG/DL (ref 65–139)
GLUCOSE P FAST SERPL-MCNC: 89 MG/DL (ref 65–94)
HCT VFR BLD AUTO: 37.5 % (ref 34.8–46.1)
HGB BLD-MCNC: 12.4 G/DL (ref 11.5–15.4)
IMM GRANULOCYTES # BLD AUTO: 0.11 THOUSAND/UL (ref 0–0.2)
IMM GRANULOCYTES NFR BLD AUTO: 1 % (ref 0–2)
LYMPHOCYTES # BLD AUTO: 2.2 THOUSANDS/ΜL (ref 0.6–4.47)
LYMPHOCYTES NFR BLD AUTO: 15 % (ref 14–44)
MCH RBC QN AUTO: 29.6 PG (ref 26.8–34.3)
MCHC RBC AUTO-ENTMCNC: 33.1 G/DL (ref 31.4–37.4)
MCV RBC AUTO: 90 FL (ref 82–98)
MONOCYTES # BLD AUTO: 0.63 THOUSAND/ΜL (ref 0.17–1.22)
MONOCYTES NFR BLD AUTO: 4 % (ref 4–12)
NEUTROPHILS # BLD AUTO: 11.41 THOUSANDS/ΜL (ref 1.85–7.62)
NEUTS SEG NFR BLD AUTO: 78 % (ref 43–75)
NRBC BLD AUTO-RTO: 0 /100 WBCS
PLATELET # BLD AUTO: 293 THOUSANDS/UL (ref 149–390)
PMV BLD AUTO: 9.1 FL (ref 8.9–12.7)
RBC # BLD AUTO: 4.19 MILLION/UL (ref 3.81–5.12)
TREPONEMA PALLIDUM IGG+IGM AB [PRESENCE] IN SERUM OR PLASMA BY IMMUNOASSAY: NORMAL
WBC # BLD AUTO: 14.69 THOUSAND/UL (ref 4.31–10.16)

## 2025-01-27 PROCEDURE — 82952 GTT-ADDED SAMPLES: CPT

## 2025-01-27 PROCEDURE — 85025 COMPLETE CBC W/AUTO DIFF WBC: CPT

## 2025-01-27 PROCEDURE — 86780 TREPONEMA PALLIDUM: CPT

## 2025-01-27 PROCEDURE — 82951 GLUCOSE TOLERANCE TEST (GTT): CPT

## 2025-01-27 PROCEDURE — 36415 COLL VENOUS BLD VENIPUNCTURE: CPT

## 2025-01-27 NOTE — TELEPHONE ENCOUNTER
Pt aware of abnormal results and that referral was placed, also aware that MFM will reach out to sched appts with her.

## 2025-01-27 NOTE — TELEPHONE ENCOUNTER
----- Message from Caty Sanders MD sent at 1/27/2025 11:46 AM EST -----  Please call pt with abnormal 3hr GTT results. This indicates dx of GDM and needs referral to M.

## 2025-01-28 ENCOUNTER — ROUTINE PRENATAL (OUTPATIENT)
Dept: OBGYN CLINIC | Facility: MEDICAL CENTER | Age: 35
End: 2025-01-28
Payer: COMMERCIAL

## 2025-01-28 VITALS — DIASTOLIC BLOOD PRESSURE: 78 MMHG | SYSTOLIC BLOOD PRESSURE: 124 MMHG | WEIGHT: 287 LBS | BODY MASS INDEX: 44.95 KG/M2

## 2025-01-28 DIAGNOSIS — Z86.32 HISTORY OF GESTATIONAL DIABETES IN PRIOR PREGNANCY, CURRENTLY PREGNANT IN SECOND TRIMESTER: ICD-10-CM

## 2025-01-28 DIAGNOSIS — Z3A.28 28 WEEKS GESTATION OF PREGNANCY: Primary | ICD-10-CM

## 2025-01-28 DIAGNOSIS — Z23 ENCOUNTER FOR IMMUNIZATION: ICD-10-CM

## 2025-01-28 DIAGNOSIS — O10.919 CHRONIC HYPERTENSION IN PREGNANCY: ICD-10-CM

## 2025-01-28 DIAGNOSIS — O24.410 GDM, CLASS A1: ICD-10-CM

## 2025-01-28 DIAGNOSIS — O09.292 HISTORY OF GESTATIONAL DIABETES IN PRIOR PREGNANCY, CURRENTLY PREGNANT IN SECOND TRIMESTER: ICD-10-CM

## 2025-01-28 PROCEDURE — 90471 IMMUNIZATION ADMIN: CPT | Performed by: OBSTETRICS & GYNECOLOGY

## 2025-01-28 PROCEDURE — 90715 TDAP VACCINE 7 YRS/> IM: CPT | Performed by: OBSTETRICS & GYNECOLOGY

## 2025-01-28 PROCEDURE — PNV: Performed by: OBSTETRICS & GYNECOLOGY

## 2025-01-28 NOTE — PROGRESS NOTES
Zee is a 34 y.o. year old  at 27w5d for routine prenatal visit.   GTT and CBC reviewed - 3 hr gtt confirming  GDM - has referral to [facility]   RhoGam needed No  Tdap needed Yes Given: Yes  FKC reviewed  28 week booklet and birth plan given today.    PTL precautions reviewed   Has appt  at  [facility] scheduled next week   A1GDM  - will schedule [facility] appointment  and initiation of glucose monitoring  / Had GDM last pregnancy  therefore aware of what  she needs to do    CHTN on medication - states has not had elevated BP at home / zimmerman will need  APFS / On ASA until  36 weeks   Elevated BMI - healthy diet and exercise  reviewed

## 2025-02-06 ENCOUNTER — ULTRASOUND (OUTPATIENT)
Dept: PERINATAL CARE | Facility: OTHER | Age: 35
End: 2025-02-06
Payer: COMMERCIAL

## 2025-02-06 ENCOUNTER — APPOINTMENT (OUTPATIENT)
Dept: PERINATAL CARE | Facility: CLINIC | Age: 35
End: 2025-02-06
Payer: COMMERCIAL

## 2025-02-06 VITALS
WEIGHT: 290.6 LBS | BODY MASS INDEX: 45.61 KG/M2 | HEART RATE: 85 BPM | SYSTOLIC BLOOD PRESSURE: 122 MMHG | DIASTOLIC BLOOD PRESSURE: 80 MMHG | HEIGHT: 67 IN

## 2025-02-06 DIAGNOSIS — E66.01 SEVERE OBESITY DUE TO EXCESS CALORIES AFFECTING PREGNANCY IN THIRD TRIMESTER (HCC): ICD-10-CM

## 2025-02-06 DIAGNOSIS — O99.213 SEVERE OBESITY DUE TO EXCESS CALORIES AFFECTING PREGNANCY IN THIRD TRIMESTER (HCC): ICD-10-CM

## 2025-02-06 DIAGNOSIS — Z3A.29 29 WEEKS GESTATION OF PREGNANCY: ICD-10-CM

## 2025-02-06 DIAGNOSIS — Z36.89 ENCOUNTER FOR ULTRASOUND TO ASSESS FETAL GROWTH: ICD-10-CM

## 2025-02-06 DIAGNOSIS — Z3A.30 30 WEEKS GESTATION OF PREGNANCY: Primary | ICD-10-CM

## 2025-02-06 DIAGNOSIS — O24.419 GESTATIONAL DIABETES MELLITUS (GDM) IN THIRD TRIMESTER, GESTATIONAL DIABETES METHOD OF CONTROL UNSPECIFIED: Primary | ICD-10-CM

## 2025-02-06 DIAGNOSIS — O10.919 CHRONIC HYPERTENSION IN PREGNANCY: ICD-10-CM

## 2025-02-06 DIAGNOSIS — O99.810 ABNORMAL GLUCOSE TOLERANCE TEST (GTT) DURING PREGNANCY, ANTEPARTUM: ICD-10-CM

## 2025-02-06 PROCEDURE — 76816 OB US FOLLOW-UP PER FETUS: CPT | Performed by: OBSTETRICS & GYNECOLOGY

## 2025-02-06 PROCEDURE — 99213 OFFICE O/P EST LOW 20 MIN: CPT | Performed by: NURSE PRACTITIONER

## 2025-02-06 NOTE — LETTER
"   .Date: 2025    Josue De La Vega MD  [address] [place] [address]    Patient: Zee Bay   YOB: 1990   Date of Visit: 2025   Gestational age 29w0d   Nature of this communication: Routine though please note new diagnosis of GDM       This patient was seen recently in our  office.  Please see ultrasound report under \"OB Procedures\" tab.  Please don't hesitate to contact our office with any concerns or questions.      Sincerely,      Viviane Reyes MD/KARINA JACINTO  Attending Physician, Maternal-Fetal Medicine  [facility]     "

## 2025-02-06 NOTE — PROGRESS NOTES
"[facility]: Ms. Bay was seen today at 29w0d gestational age for fetal growth assessment ultrasound.  See ultrasound report under \"OB Procedures\" tab.    Rosy JACINTO    Zee Bay is here today at 29w0d for evaluation of fetal weight and amniotic fluid.  On 2025 she failed her repeat  3-hour glucose tolerance test. She reports active fetal movement and has no complaints.    She has a history of :  1. Pregravid BMI of 43  2.  Chronic hypertension on medication  3.  Gestational diabetes  4. Gestational diabetes in a prior pregnancy     A viable mcgovern intrauterine pregnancy is seen. Estimated fetal weight and amniotic fluid are within normal limits for gestational age.  This is at the 79th percentile.  No fetal anomalies are visualized on limited survey. Placenta is within normal limits.       Evaluation and Management:   1. The patient was counseled regarding the above findings. The limitations of ultrasound were reviewed.   2. We discussed her diagnosis of gestational diabetes (GDM).  She is scheduled for her first class tomorrow.   We reviewed the importance of glycemic control in promoting normal fetal growth and well-being. Goal blood glucose ranges in pregnancy are fasting glucose <90 and two hour post-prandial <120. Exercise is encouraged, and improves glycemic control. A diabetic diet is also recommended, and nutrition counseling is integrated into education and follow-up through our program. If she remains well controlled with diet, we recommend onlyserial ultrasound evaluation of fetal growth and  amniotic fluid every 4-6 weeks. Should she require medical management of blood glucose, antepartum fetal surveillance should also be initiated twice weekly (at diagnosis or 32 weeks, whichever is later) until delivery.  If she goes past her EDC, antepartum fetal surveillance should be initiated.  I recommend that she continue to be closely followed by our diabetes in " pregnancy program.  Due to increased risk of developing Type II diabetes, a 75 gram, two-hour, OGTT is recommended as initial follow up 6-12 weeks following delivery.  3.  Discussed the status of her chronic hypertension.  Her blood pressure is well-controlled on labetalol 100 mg twice daily.  She is aware to continue her aspirin until 36 weeks.  Delivery timing is between 37 and 39 weeks, favoring  38-39 weeks for well-controlled chronic hypertension on medication.    Split-shared decision-making between OPHELIA Champion and myself was utilized, with the majority of the time spent by VINCE Champion overall medical decision making level is low (diagnosis low, data limited and risk low).    Our recommendations are as follows:   1.  Growth scan in 4 weeks  2.  Weekly NST and SAMY's beginning at 32 weeks for the indication of chronic hypertension on medication.     At the conclusion of today's encounter, all questions were answered to her satisfaction. Thank you very much for this kind referral and please do not hesitate to contact me with any further questions or concerns.

## 2025-02-06 NOTE — PATIENT INSTRUCTIONS
CLASS 1  Diabetes and Pregnancy Program   [facility] - Maternal Fetal Medicine    Diabetes Team:   OPHELIA Baez CDE   Veronica Roman RD CD (Judy)E MS Varma (Annamarie) GUILLERMO Brooks MS, ZUHAIR Muller RD CDE MPH    Resource: Gestational Diabetes  ACOG     CHECKING BLOOD SUGARS    Always carry your meter and testing supplies with you at all times. Bring your glucose meter to all your appointments in our [facility] office.     Prescription for Meter/Strips/Lancets:   A request for a glucose meter, test strips and lancets was sent to the provider for approval.   Once your prescriptions are approved by the provider, your prescription will be sent electronically to your pharmacy.   Be mindful the provider is seeing patients in the office today so allow ample time for your prescriptions to be approved.   Call your pharmacy to verify your medication was received electronically and is ready for pick-up before going to pharmacy. If you have any issues with coverage or your meter is unavailable, please reach out to our office at [phone].     Coupons/Savings:   One Touch Verio: RX Finder  Automatic Savings for Diabetes Supplies  OneTouch®   Contour Next: Contour® NEXT Test Strips Discounts  [employer] [facility]     How to Check Blood Sugars:  Wash your hands with soap and water or use waterless  to clean your hands.  Alcohol can dry your fingers and is not recommended.  Alcohol can also cause false, elevated glucose readings.  AVOID scented soaps and hand sanitizers - scented soaps may include sugar which can cause inaccurate/elevated readings   Gather your glucose meter kit and supplies.  Prepare your meter by inserting a new test strip.   This will automatically turn on your meter  Make sure test strips are not .  Use a new test strip each time.   Prepare your lancing device by inserting the lancet               After the lancet is securely in place, twist off  "the top to reveal needle.   Reattach lancing device top   Once lancing device top is reattached, you are now ready to prick the side of your fingertip to get a blood sample.  You can adjust the depth setting as needed. We recommend to start at \"4\".   Apply the blood sample to test strip according to instructions.   Make sure your sharp container is: heavy duty plastic, puncture-resistant lid, upright and stable, leak resistant, properly labeled.   Record your blood sugar     Additional References and Video:   One Touch Verio: OneTouch Verio Flex®  Blood Glucose Meter  OneTouch®   Contour Next: Instructional Videos - [facility] Flex Diabetes Care     Frequency: 4 Times Daily     Timing:   Fasting   Test when you wake up before you have anything to eat or drink  At least 8hrs but no more than 10hrs from your bedtime snack  Exceeding 10hrs may result in inaccurate readings  2 hrs after the first bite of your meal (breakfast, lunch, dinner) **do not test for snacks    Goals:    Fasting  60-95   1 Hour   After Meals <140   2 Hours   After Meals <120   *please inform member of diabetes team if you begin testing 1hr blood sugars    REPORTING BLOOD SUGARS:   Please only pick ONE way to report to our team. Choose the best option for you.     [employer] Blood Glucose Flow sheet under \"Track my Health\"   Remember to click \"save\" for your readings to automatically upload into Epic.   [employer] Message with Attachment(s)  Send a picture of a written blood sugar log   To: OPHELIA Baez (Medical Question - Non Urgent)  You may include up to 3 images per message  Leave Voicemail at [phone]   Include: Name, Date of Birth, and Date + Blood Sugars    The diabetes team will review your blood sugar log weekly till delivery.             MEAL PLAN AND DIET INSTRUCTIONS    *Carbohydrates: Sources of food that increase your blood sugar (include: starches, fruits, milk, desserts, starchy vegetables such as corn, peas, squash)    Meal Plan " (3 Meals and 3 Snacks)  Outlines grams of carbohydrates to have at each meal and snack  Minimum Carbohydrate Intake Daily (avoid ketosis): 175g *to be distributed throughout day as instructed in meal plan  Include Protein at Every Meal and Snack  Eat all 3 meals and 3 snacks  Eating every 2 to 3.5 hours during the day.   Preferably at the same times every day.   Avoid going long periods of time without eating.        Be sure to have bedtime snack that includes at least 30 grams of carbohydrates and 2 ounces (14 grams) of protein.  Refer to Food Lists in Booklet (pages 15-17)   Each food listed is equal to 15g (1 Carbohydrate Serving)  Read Food Label   Check Total Carbohydrate  15g = 1 Carbohydrate Serving  Check Serving Size!   The total carbohydrate amount listed is based on 1 serving size  Be careful as many items contain more than one serving per package  Check Total Sugars  Choose items with <15g per serving of total sugar    Exercise:  If ok by your OB try adding a 20-30 minute walk after dinner to help keep fasting blood sugar within range.  Try incorporating at least 10 minutes of walking after each meal  Resource: Exercise During Pregnancy  ACOG     Additional Information: (to be reviewed at class 2)  Continue to follow up with your OB and MFM providers as recommended.  Always have glucose available for hypoglycemia, use 15 by 15 rule. (Page 29 in booklet)  While sick or feeling ill, follow our sick day guidelines in our GDM booklet. (Page 28 in booklet)  For travel and dining out tips refer to your GDM booklet. See attachment (Page 31 in booklet)    Class 2 Information: Approximately 1 week following Class 1  Bring 3 Day Food Log (everything you eat and drink for each meal and snack) or write down meals associated with elevated after meal blood sugars  Will review diet more in depth and provide feedback     Remember: Importance of maintaining tight control of blood sugars during pregnancy = decrease risk  factors including fetal macrosomia; birth injury; risk of ; polyhydramnios; pre-term labor; pre-eclampsia;  hypoglycemia; jaundice and stillbirth.    Any questions give us a call at [phone] or send us a [employer] message to OPHELIA Baez.     Avani Brooks RD  Diabetes Educator   The Diabetes and Pregnancy Program  [facility] - Maternal Fetal Medicine     Thank you for choosing us for your  care today.  If you have any questions about your ultrasound or care, please do not hesitate to contact us or your primary obstetrician.        Some general instructions for your pregnancy are:    Exercise: Aim for 150 minutes per week of regular exercise.  Walking is great!  Nutrition: Choose healthy sources of calcium, iron, and protein.  Avoid ultraprocessed foods and added sugar.  Learn about Preeclampsia: preeclampsia is a common, potentially serious high blood pressure complication in pregnancy.  A blood pressure of 140mmHg (systolic or top number) or 90mmHg (diastolic or bottom number) should be evaluated by your doctor.  Aspirin is sometimes prescribed in early pregnancy to prevent preeclampsia in women with risk factors - ask your obstetrician if you should be on this medication.  For more resources, visit:  https://www.highriskpregnancyinfo.org/preeclampsia  If you smoke, please try to quit completely but also try to reduce your smoking by as much as possible (as soon as possible).  Do not vape.  Please also avoid cannabis products.  Other warning signs to watch out for in pregnancy or postpartum: chest pain, obstructed breathing or shortness of breath, seizures, thoughts of hurting yourself or your baby, bleeding, a painful or swollen leg, fever, or headache (see [employer]ON[facility] POST-BIRTH Warning Signs campaign).  If these happen call 911.  Itching is also not normal in pregnancy and if you experience this, especially over your hands and feet, potentially worse at night,  notify your doctors.

## 2025-02-07 ENCOUNTER — TELEPHONE (OUTPATIENT)
Age: 35
End: 2025-02-07

## 2025-02-07 ENCOUNTER — TELEMEDICINE (OUTPATIENT)
Dept: PERINATAL CARE | Facility: CLINIC | Age: 35
End: 2025-02-07
Payer: COMMERCIAL

## 2025-02-07 DIAGNOSIS — Z3A.29 29 WEEKS GESTATION OF PREGNANCY: ICD-10-CM

## 2025-02-07 DIAGNOSIS — O24.419 GESTATIONAL DIABETES MELLITUS (GDM) IN THIRD TRIMESTER, GESTATIONAL DIABETES METHOD OF CONTROL UNSPECIFIED: Primary | ICD-10-CM

## 2025-02-07 PROCEDURE — G0109 DIAB MANAGE TRN IND/GROUP: HCPCS

## 2025-02-07 RX ORDER — LANCETS
EACH MISCELLANEOUS
Qty: 100 EACH | Status: CANCELLED | OUTPATIENT
Start: 2025-02-07

## 2025-02-07 RX ORDER — LANCETS
EACH MISCELLANEOUS
Qty: 100 EACH | Refills: 5 | Status: SHIPPED | OUTPATIENT
Start: 2025-02-07 | End: 2025-02-07 | Stop reason: SDUPTHER

## 2025-02-07 RX ORDER — BLOOD-GLUCOSE METER
EACH MISCELLANEOUS
Qty: 1 KIT | Refills: 0 | Status: CANCELLED | OUTPATIENT
Start: 2025-02-07

## 2025-02-07 RX ORDER — BLOOD-GLUCOSE METER
EACH MISCELLANEOUS
Qty: 1 KIT | Refills: 0 | Status: SHIPPED | OUTPATIENT
Start: 2025-02-07 | End: 2025-02-07 | Stop reason: SDUPTHER

## 2025-02-07 NOTE — TELEPHONE ENCOUNTER
Patient called regarding 3/6/26 US/NST.  Appointment is scheduled in [place] and [place].  Patient prefers [place].   Attempted warm transfer.

## 2025-02-07 NOTE — PROGRESS NOTES
CLASS 1 - Group  (virtual visit)    Thank you for referring your patient to [facility] Maternal Fetal Medicine Diabetes and Pregnancy Program.     Subjective:     Zee Bay is a 34 y.o. female who presents today unaccompanied for Virtual Regular Visit, Gestational Diabetes, and Patient Education. Patient is at 29w1d gestation, Estimated Date of Delivery: 25.     Reviewed and updated the following from patients medical record: Demographics, Education, Occupation, PMH, Problem List, Allergies, and Current Medications. D&P Assessment responses can be found in completed [employer] questionnaire.    Visit Diagnosis:  Encounter Diagnosis     ICD-10-CM    1. Gestational diabetes mellitus (GDM) in third trimester, gestational diabetes method of control unspecified  O24.419 YUPIQhart glucose flowsheet      2. 29 weeks gestation of pregnancy  Z3A.29 Mychart glucose flowsheet           Discussed with patient:   - Pathophysiology of Gestational diabetes mellitus (GDM) in third trimester, gestational diabetes method of control unspecified [O24.419].  - Untreated hyperglycemia in pregnancy and maternal fetal complications (fetal macrosomia,  hypoglycemia, polyhydramnios, increased incidence of  section,  labor, and in severe cases fetal demise and still birth).   - Importance of blood glucose monitoring, nutrition, and medication if necessary in achieving BG goals.     Labs    Lab Results   Component Value Date/Time    HPE5KDXQ75RS 152 (H) 10/16/2024 11:02 AM      Lab Results   Component Value Date/Time    GLUF 89 2025 08:12 AM    HDKDLGQ2WT 199 (H) 2025 09:13 AM    YOHRUYV5IY 183 (H) 2025 10:14 AM    [id number] 56 (L) 2025 11:10 AM      Lab Results   Component Value Date/Time    HGBA1C 5.2 10/22/2024 08:45 AM    HGBA1C 5.4 2024 09:02 AM    HGBA1C 5.4 2023 08:56 AM        Ordered This Visit: None    Current Outpatient Medications  Diabetic Medications:  "None      Current Outpatient Medications:     aspirin (Aspirin 81) 81 mg EC tablet, 81 mg 2 (two) times a day, Disp: , Rfl:     Blood Glucose Monitoring Suppl (Contour Next EZ) w/Device KIT, Test x4 Daily or as instructed, Disp: 1 kit, Rfl: 0    Contour Next Test test strip, Test 4 Times Daily or as instructed, Disp: 100 strip, Rfl: 5    Elastic Bandages & Supports (Lumbosacral Supp Abdominal XL) MISC, Wear device when active, take off at rest, Disp: 1 each, Rfl: 0    labetalol (NORMODYNE) 100 mg tablet, Take 1 tablet (100 mg total) by mouth 2 (two) times a day, Disp: 180 tablet, Rfl: 3    Microlet Lancets MISC, Use 4 a Day or as instructed, Disp: 100 each, Rfl: 5    Prenatal MV & Min w/FA-DHA (ONE A DAY PRENATAL PO), , Disp: , Rfl:      Anthropometrics:    Pre-Pregnancy:   Pre-Gravid Wt: 126 kg (278 lb)  Pre-Gravid BMI: 43.53    Current:  Ht Readings from Last 1 Encounters:   02/06/25 5' 7\" (1.702 m)      Wt Readings from Last 3 Encounters:   02/10/25 132 kg (291 lb 9.6 oz)   02/06/25 132 kg (290 lb 9.6 oz)   01/28/25 130 kg (287 lb)      Current BMI: There is no height or weight on file to calculate BMI.    Weight Gain in Pregnancy:  Current TWG (5.715 kg (12 lb 9.6 oz)) compared to recommended TWG (5 kg (11 lb)-9 kg (19 lb)): Within Range -- Continue recommended rate of weight gain based on pre-pregnancy BMI till delivery    Most Recent Ultrasound Results:  Findings: (2/6/25; 29w0d) AC: 92%, EFW: 79%, NML SAMY    Blood Glucose Monitoring:   Ordered Glucose Meter: Contour Next EZ   Meter Teaching: Gave instruction on site selection, skin preparation, loading strips and lancet device, meter activation, obtaining blood sample, test strip and lancet disposal and storage, and recording log book entries.     BG Monitoring Recommendations:  Frequency: 4 x per day (Fasting, 2 hour after start of each meal)  Goals: (Fasting) 60-95mg/dL // (2hr PP) <120mg/dL  Reporting: Weekly via [facility] Glucose Flowsheet     Meal Plan: " "  Reviewed:  Daily Calories/Carbohydrate/Protein: 2200 calorie (CHO: 48-80-50-30-60-30) (PRO: 3-2-3/4-2-3/4-2)   Appropriate amounts of CHO, PRO, and Fat at each meal and snack.   CHO exchange list, and portion sizes for both CHO and PRO  How to read a food label  Suggested meal/snack options to increase nutrition and maintain consistent meal and snack intakes  Eat every 2.0-3.5 hours while awake  Go no longer than 8-10 hours fasting overnight until first meal of the day.     Physical Activity:  Reviewed:   Benefits of physical activity to optimize blood glucose control, encouraged activity at patient is physically able.   Instructed pt to always consult a physician prior to starting an exercise program.   Recommend 20-30 minutes daily.     Patient Stated Goal: \"I will eat 3 meals and 3 snacks each day, including protein at each\"  Expected Compliance: good  Barriers to Learning/Change:  Works Night Shift  Diabetes Self Management Support Plan (outside of ongoing care): Spouse/Family     Date to Report Blood Sugars: Day Before Class 2, Then Weekly (till delivery)    Class 2: Return in 4 days (on 2025) for Class 2 w/ Avani Brooks RD.   *Patient instructed to bring 3 day food diary and/or write down foods associated with elevated after meal blood sugars    Begin Time: 9:00am    End Time: 10:00am    It was a pleasure working with them today. Please feel free to call ([phone]) with any questions or concerns.    Avani Brooks RD   Diabetes Educator  [facility] Maternal Fetal Medicine  Diabetes and Pregnancy Program  [address] [place] [address], [address]  [place] [id number]    Virtual Regular Visit  Name: Zee Bay      : 1990      MRN: [id number]  Encounter Provider: Avani Brooks RD  Encounter Date: 2025   Encounter department: [facility]      Verification of patient location:  Patient is located at Home in the following state in which I hold an active license [place] " :  Assessment & Plan  Gestational diabetes mellitus (GDM) in third trimester, gestational diabetes method of control unspecified    Lab Results   Component Value Date    HGBA1C 5.2 10/22/2024       Orders:    [facility] glucose flowsheet    29 weeks gestation of pregnancy    Orders:    [facility] glucose flowsheet    Gestational diabetes mellitus (GDM) in third trimester, gestational diabetes method of control unspecified    Lab Results   Component Value Date    HGBA1C 5.2 10/22/2024          29 weeks gestation of pregnancy               Encounter provider Avani Brooks RD    The patient was identified by name and date of birth. Zee Bay was informed that this is a telemedicine visit and that the visit is being conducted through the [employer] platform. She agrees to proceed..  My office door was closed. No one else was in the room.  She acknowledged consent and understanding of privacy and security of the video platform. The patient has agreed to participate and understands they can discontinue the visit at any time.    Patient is aware this is a billable service.     History of Present Illness     HPI  Review of Systems    Objective   LMP 07/11/2024 (Approximate)     Physical Exam    Visit Time  Total Visit Duration: 60min

## 2025-02-09 NOTE — PROGRESS NOTES
Name: Zee Bay      : 1990      MRN: [id number]  Encounter Provider: Romy Kendrick CNM  Encounter Date: 2/10/2025   Encounter department: [facility] [place]  :  Assessment & Plan  Gestational diabetes mellitus (GDM) in third trimester, gestational diabetes method of control unspecified  - Needs to get glucometer to start checking sugars  - completed first class    Lab Results   Component Value Date    HGBA1C 5.2 10/22/2024          Chronic hypertension in pregnancy  Baseline labs are normal   Currently on labetalol 100 mg 2 times daily  ANFS starting at 32 weeks  Consider delivery 37-39 weeks depending on control          29 weeks gestation of pregnancy  Consider delivery 37-39 weeks depending on control   Birth plan- reviewed  162 mg ASA daily until 36 weeks  Breast pump ordered  Next visit 2 weeks           History of Present Illness   Zee Bay is a 34 y.o.  female who presents for routine prenatal care at 29w4d.  Pregnancy ROS: no leakage of fluid, pelvic pain, or vaginal bleeding.  Good fetal movement.    Objective:  /82   Wt 132 kg (291 lb 9.6 oz)   LMP 2024 (Approximate)   BMI 45.67 kg/m²   Pregravid Weight/BMI: 126 kg (278 lb) (BMI 43.53)  Current Weight: 132 kg (291 lb 9.6 oz)   Total Weight Gain: 6.169 kg (13 lb 9.6 oz)   Pre- Vitals    Flowsheet Row Most Recent Value   Prenatal Assessment    Fetal Heart Rate 146   Movement Present   Prenatal Vitals    Blood Pressure 130/82   Weight - Scale 132 kg (291 lb 9.6 oz)   Urine Albumin/Glucose    Dilation/Effacement/Station    Vaginal Drainage    Edema    LLE Edema None   RLE Edema None            Zee Bay is a 34 y.o. female who presents for prenatal visit  History obtained from: patient    Review of Systems   Constitutional:  Negative for chills and fever.   Respiratory:  Negative for cough and shortness of breath.    Cardiovascular:  Negative for chest pain and  palpitations.   Genitourinary:  Negative for difficulty urinating and vaginal bleeding.   Psychiatric/Behavioral:  The patient is not nervous/anxious.      Medical History Reviewed by provider this encounter:  Tobacco  Allergies  Meds  Problems  Med Hx  Surg Hx  Fam Hx     .  Current Outpatient Medications on File Prior to Visit   Medication Sig Dispense Refill   • aspirin (Aspirin 81) 81 mg EC tablet 81 mg 2 (two) times a day     • Elastic Bandages & Supports (Lumbosacral Supp Abdominal XL) MISC Wear device when active, take off at rest 1 each 0   • labetalol (NORMODYNE) 100 mg tablet Take 1 tablet (100 mg total) by mouth 2 (two) times a day 180 tablet 3   • Prenatal MV & Min w/FA-DHA (ONE A DAY PRENATAL PO)      • Blood Glucose Monitoring Suppl (Contour Next EZ) w/Device KIT Test x4 Daily or as instructed 1 kit 0   • Contour Next Test test strip Test 4 Times Daily or as instructed 100 strip 5   • Microlet Lancets MISC Use 4 a Day or as instructed 100 each 5     No current facility-administered medications on file prior to visit.         Objective   /82   Wt 132 kg (291 lb 9.6 oz)   LMP 07/11/2024 (Approximate)   BMI 45.67 kg/m²      Physical Exam  Vitals reviewed.   Constitutional:       Appearance: Normal appearance.   Pulmonary:      Effort: Pulmonary effort is normal.   Abdominal:      Comments: Gravid uterus   Neurological:      Mental Status: She is alert and oriented to person, place, and time.   Psychiatric:         Mood and Affect: Mood normal.         Behavior: Behavior normal.

## 2025-02-09 NOTE — ASSESSMENT & PLAN NOTE
Baseline labs are normal   Currently on labetalol 100 mg 2 times daily  ANFS starting at 32 weeks  Consider delivery 37-39 weeks depending on control

## 2025-02-09 NOTE — ASSESSMENT & PLAN NOTE
- Needs to get glucometer to start checking sugars  - completed first class    Lab Results   Component Value Date    HGBA1C 5.2 10/22/2024

## 2025-02-10 ENCOUNTER — ROUTINE PRENATAL (OUTPATIENT)
Dept: OBGYN CLINIC | Facility: CLINIC | Age: 35
End: 2025-02-10

## 2025-02-10 VITALS — WEIGHT: 291.6 LBS | BODY MASS INDEX: 45.67 KG/M2 | SYSTOLIC BLOOD PRESSURE: 130 MMHG | DIASTOLIC BLOOD PRESSURE: 82 MMHG

## 2025-02-10 DIAGNOSIS — O24.419 GESTATIONAL DIABETES MELLITUS (GDM) IN THIRD TRIMESTER, GESTATIONAL DIABETES METHOD OF CONTROL UNSPECIFIED: Primary | ICD-10-CM

## 2025-02-10 DIAGNOSIS — Z3A.29 29 WEEKS GESTATION OF PREGNANCY: ICD-10-CM

## 2025-02-10 DIAGNOSIS — O10.919 CHRONIC HYPERTENSION IN PREGNANCY: ICD-10-CM

## 2025-02-10 PROCEDURE — PNV: Performed by: ADVANCED PRACTICE MIDWIFE

## 2025-02-10 RX ORDER — BLOOD-GLUCOSE METER
EACH MISCELLANEOUS
Qty: 1 KIT | Refills: 0 | Status: SHIPPED | OUTPATIENT
Start: 2025-02-10 | End: 2025-04-24

## 2025-02-10 RX ORDER — LANCETS
EACH MISCELLANEOUS
Qty: 100 EACH | Refills: 5 | Status: SHIPPED | OUTPATIENT
Start: 2025-02-10 | End: 2025-04-24

## 2025-02-10 NOTE — ASSESSMENT & PLAN NOTE
Consider delivery 37-39 weeks depending on control   Birth plan- reviewed  162 mg ASA daily until 36 weeks  Breast pump ordered  Next visit 2 weeks

## 2025-02-10 NOTE — ASSESSMENT & PLAN NOTE
Lab Results   Component Value Date    HGBA1C 5.2 10/22/2024       Orders:    Mychart glucose flowsheet

## 2025-02-11 ENCOUNTER — TELEMEDICINE (OUTPATIENT)
Dept: PERINATAL CARE | Facility: CLINIC | Age: 35
End: 2025-02-11
Payer: COMMERCIAL

## 2025-02-11 DIAGNOSIS — O24.410 DIET CONTROLLED GESTATIONAL DIABETES MELLITUS (GDM) IN THIRD TRIMESTER: Primary | ICD-10-CM

## 2025-02-11 DIAGNOSIS — Z3A.29 29 WEEKS GESTATION OF PREGNANCY: ICD-10-CM

## 2025-02-11 DIAGNOSIS — O09.292 HISTORY OF GESTATIONAL DIABETES IN PRIOR PREGNANCY, CURRENTLY PREGNANT IN SECOND TRIMESTER: ICD-10-CM

## 2025-02-11 DIAGNOSIS — Z86.32 HISTORY OF GESTATIONAL DIABETES IN PRIOR PREGNANCY, CURRENTLY PREGNANT IN SECOND TRIMESTER: ICD-10-CM

## 2025-02-11 DIAGNOSIS — O99.810 ABNORMAL GLUCOSE TOLERANCE TEST (GTT) DURING PREGNANCY, ANTEPARTUM: ICD-10-CM

## 2025-02-11 PROCEDURE — G0109 DIAB MANAGE TRN IND/GROUP: HCPCS

## 2025-02-11 NOTE — PROGRESS NOTES
"CLASS 2 - Group  (virtual visit)    Thank you for referring your patient to [facility] Maternal Fetal Medicine Diabetes and Pregnancy Program.     Zee Bay is a  34 y.o. female who presents today unaccompanied for Virtual Regular Visit, Patient Education, and Gestational Diabetes.  Patient is at 29w5d gestation, Estimated Date of Delivery: 4/24/25.     Visit Diagnosis:  Encounter Diagnosis     ICD-10-CM    1. Diet controlled gestational diabetes mellitus (GDM) in third trimester  O24.410       2. 29 weeks gestation of pregnancy  Z3A.29       3. History of gestational diabetes in prior pregnancy, currently pregnant in second trimester  O09.292     Z86.32       4. Abnormal glucose tolerance test (GTT) during pregnancy, antepartum  O99.810            Reviewed and updated the following from patients medical record: PMH, Problem List, Allergies, and Current Medications.    Labs  GDM LABS: See Class 1 Note    A1C:  Lab Results   Component Value Date/Time    HGBA1C 5.2 10/22/2024 08:45 AM    HGBA1C 5.4 06/19/2024 09:02 AM    HGBA1C 5.4 01/16/2023 08:56 AM        Labs Ordered This Visit: None    Current Medications:    Current Outpatient Medications:     aspirin (Aspirin 81) 81 mg EC tablet, 81 mg 2 (two) times a day, Disp: , Rfl:     Blood Glucose Monitoring Suppl (Contour Next EZ) w/Device KIT, Test x4 Daily or as instructed, Disp: 1 kit, Rfl: 0    Contour Next Test test strip, Test 4 Times Daily or as instructed, Disp: 100 strip, Rfl: 5    Elastic Bandages & Supports (Lumbosacral Supp Abdominal XL) MISC, Wear device when active, take off at rest, Disp: 1 each, Rfl: 0    labetalol (NORMODYNE) 100 mg tablet, Take 1 tablet (100 mg total) by mouth 2 (two) times a day, Disp: 180 tablet, Rfl: 3    Microlet Lancets MISC, Use 4 a Day or as instructed, Disp: 100 each, Rfl: 5    Prenatal MV & Min w/FA-DHA (ONE A DAY PRENATAL PO), , Disp: , Rfl:      Anthropometrics:  Ht Readings from Last 1 Encounters:   02/06/25 5' 7\" " (1.702 m)      Wt Readings from Last 3 Encounters:   02/10/25 132 kg (291 lb 9.6 oz)   25 132 kg (290 lb 9.6 oz)   25 130 kg (287 lb)        Pre-Gravid Wt Pre-Gravid BMI TWG   126 kg (278 lb) 43.53 6.169 kg (13 lb 9.6 oz)     Total Pregnancy Weight Gain Recommendations: BMI (> 30) 11-20 lbs  Current Wt Status Compared to Recommendations: Within Range -- Continue recommended rate of weight gain based on pre-pregnancy BMI till delivery    Most Recent Ultrasound Results:  Findings: NML Growth/SAMY    BLOOD GLUCOSE MONITORING:   Glucometer: Contour Next EZ     Reinforced at Today's Visit:   Timing/Frequency of SMB x per day (Fasting, 2 hour after start of each meal)  Goals: (Fasting) 60-95mg/dL // (2hr PP) <120mg/dL  Reporting Guidelines: Weekly via Phone: [phone] OR My Chart (Message with image attachment) OR Glucose Flowsheet  Method of Reporting: [facility] Glucose Flowsheet    BG LOG:       Review of Blood Glucose Log:   Limited assessment; reported blood sugars so far are well controlled within goal range    MEAL PLAN (Patient was provided with a meal plan including 3 meals and 3 snacks at class 1)  *Calories: 2200 calorie (CHO:00-40-48-30-60-30) (PRO: 3-2-3/4-2-3/4-2)    Reinforced Diet Instructions:  Individualized meal plan.   Importance of consistent carbohydrate intake via 3 meals and 3 snacks per day   Importance of protein as it relates to blood glucose control.   Encouraged  patient to eat every 2.0-3.5 hours while awake  Encouraged patient to go no longer than 8-10 hours fasting overnight until first meal of the day.  Provided suggested meal/snack options to increase nutrition and maintain consistent meal and snack intakes.    Physical Activity:    Reviewed w/ Pt:   Benefits of physical activity to optimize blood glucose control, encouraged activity at patient is physically able.   Instructed pt to always consult a physician prior to starting an exercise program.   Recommend 20-30  "minutes daily.    Additional Topics Reviewed:    Medications: (reviewed options available with pt)  Discussed if blood sugars are not within normal range with meal planning and exercise  Reviewed medication such as metformin and/or basal/bolus insulin may be needed for better glucose control  Maternal-Fetal Testing:   Ultrasounds: growth scans every 4 weeks.  If anti-diabetic medication started, pt should complete NST x2 weekly and SAMY x 1 weekly starting at 32nd week GA unless otherwise instructed per M physician.  Sick day Guidelines:   Advised that sickness will raise blood sugar   If blood sugar is > 160 mg/dL twice in one day call doctor  If on diabetes medications, continue as instructed   If unable to consume normal meal plan, instructed to remain well hydrated   Hypoglycemia & Treatment Guidelines:  Reviewed what hypoglycemia is, signs and symptoms, and how to treat via the 15:15 rule.  Post-Partum Guidelines:  Completion of 75 gm CHO 2 hr gtt at 6 weeks post-partum to check for Type 2 DM diagnosis  Breastfeeding Guidelines:  Continue GDM meal plan plus additional 350-500 calories daily  Examples of protein and carbohydrate snacks provided.  Stay hydrated by drinking 8-10 (8 oz.) fluids daily.  Dining Out & Travel Guidelines:  Patient advised to be prepared with extra diabetes supplies, medications, and snacks, as well as sticking to the same time schedule and portions eaten at home for meals and snacks.    Patient Stated Goal: \"I will eat 3 meals and 3 snacks each day, including protein at each\"  Goal Assessment: On track    Diabetes Self Management Support Plan outside of ongoing care: Spouse/Family    Barriers to Learning/Change:  Night Shift  Expected Compliance: good    Date to report blood sugars: Weekly   Follow up:  Return for per review of weekly blood sugar log.     Begin Time: 8:00am  End Time: 9:00am    It was a pleasure working with them today. Please feel free to call ([phone]) with any " questions or concerns.    Avani Brooks RD   Diabetes Educator  [facility] Maternal Fetal Medicine  Diabetes and Pregnancy Program  [address], [address]  [place] [address]    Virtual Regular VisitName: Zee Bay      : 1990      MRN: [id number]  Encounter Provider: Avani Brooks RD  Encounter Date: 2025   Encounter department: [facility] [place]  :  Assessment & Plan  Diet controlled gestational diabetes mellitus (GDM) in third trimester    Lab Results   Component Value Date    HGBA1C 5.2 10/22/2024            29 weeks gestation of pregnancy         History of gestational diabetes in prior pregnancy, currently pregnant in second trimester         Abnormal glucose tolerance test (GTT) during pregnancy, antepartum         29 weeks gestation of pregnancy               History of Present Illness     HPI  Review of Systems    Objective   LMP 2024 (Approximate)     Physical Exam    Administrative Statements   Encounter provider Avani Brooks RD    The Patient is located at Home and in the following state in which I hold an active license [place].    The patient was identified by name and date of birth. Zee Bay was informed that this is a telemedicine visit and that the visit is being conducted through the [employer] platform. She agrees to proceed..  My office door was closed. No one else was in the room.  She acknowledged consent and understanding of privacy and security of the video platform. The patient has agreed to participate and understands they can discontinue the visit at any time.    I have spent a total time of 60 minutes in caring for this patient on the day of the visit/encounter.

## 2025-02-14 LAB
DME PARACHUTE DELIVERY DATE ACTUAL: NORMAL
DME PARACHUTE DELIVERY DATE REQUESTED: NORMAL
DME PARACHUTE ITEM DESCRIPTION: NORMAL
DME PARACHUTE ORDER STATUS: NORMAL
DME PARACHUTE SUPPLIER NAME: NORMAL
DME PARACHUTE SUPPLIER PHONE: NORMAL

## 2025-02-27 ENCOUNTER — ULTRASOUND (OUTPATIENT)
Dept: PERINATAL CARE | Facility: OTHER | Age: 35
End: 2025-02-27
Payer: COMMERCIAL

## 2025-02-27 ENCOUNTER — ROUTINE PRENATAL (OUTPATIENT)
Dept: OBGYN CLINIC | Facility: CLINIC | Age: 35
End: 2025-02-27

## 2025-02-27 VITALS — SYSTOLIC BLOOD PRESSURE: 122 MMHG | BODY MASS INDEX: 45.23 KG/M2 | DIASTOLIC BLOOD PRESSURE: 82 MMHG | WEIGHT: 288.8 LBS

## 2025-02-27 VITALS
SYSTOLIC BLOOD PRESSURE: 116 MMHG | HEART RATE: 100 BPM | DIASTOLIC BLOOD PRESSURE: 76 MMHG | WEIGHT: 287.2 LBS | BODY MASS INDEX: 45.08 KG/M2 | HEIGHT: 67 IN

## 2025-02-27 DIAGNOSIS — O99.213 SEVERE OBESITY DUE TO EXCESS CALORIES AFFECTING PREGNANCY IN THIRD TRIMESTER (HCC): ICD-10-CM

## 2025-02-27 DIAGNOSIS — Z3A.32 32 WEEKS GESTATION OF PREGNANCY: Primary | ICD-10-CM

## 2025-02-27 DIAGNOSIS — E66.01 SEVERE OBESITY DUE TO EXCESS CALORIES AFFECTING PREGNANCY IN THIRD TRIMESTER (HCC): ICD-10-CM

## 2025-02-27 DIAGNOSIS — O10.919 CHRONIC HYPERTENSION IN PREGNANCY: ICD-10-CM

## 2025-02-27 DIAGNOSIS — O24.410 DIET CONTROLLED GESTATIONAL DIABETES MELLITUS (GDM) IN THIRD TRIMESTER: ICD-10-CM

## 2025-02-27 PROCEDURE — 59025 FETAL NON-STRESS TEST: CPT | Performed by: OBSTETRICS & GYNECOLOGY

## 2025-02-27 PROCEDURE — PNV: Performed by: OBSTETRICS & GYNECOLOGY

## 2025-02-27 PROCEDURE — 76815 OB US LIMITED FETUS(S): CPT | Performed by: OBSTETRICS & GYNECOLOGY

## 2025-02-27 NOTE — ASSESSMENT & PLAN NOTE
Starting BMI is 43  Will get NST for CHTN   Asa till 36 weeks    NST are stating at m already

## 2025-02-27 NOTE — ASSESSMENT & PLAN NOTE
Cont the check in the DM center     AC is 92% - at 29 weeks will need to check weight at the next visit   Will get repeat growth on 3/7/25    Lab Results   Component Value Date    HGBA1C 5.2 10/22/2024

## 2025-02-27 NOTE — PROGRESS NOTES
Routine Prenatal Visit  [facility] [address] Debbie James PA      OB/GYN Prenatal Visit    ASSESSMENT / PLAN:  1. 32 weeks gestation of pregnancy  Assessment & Plan:  NIPT and AFP are normal   2. Severe obesity due to excess calories affecting pregnancy in third trimester (HCC)  Assessment & Plan:  Starting BMI is 43  Will get NST for CHTN   Asa till 36 weeks    NST are stating at [facility] already       3. Chronic hypertension in pregnancy  Assessment & Plan:  Baseline labs are normal   Currently on labetalol 100 BID     4. Diet controlled gestational diabetes mellitus (GDM) in third trimester  Assessment & Plan:  Cont the check in the DM center     AC is 92% - at 29 weeks will need to check weight at the next visit   Will get repeat growth on 3/7/25    Lab Results   Component Value Date    HGBA1C 5.2 10/22/2024           SUBJECTIVE:  Zee Bay is a 34 y.o.  at 32 here for prenatal visit.  She has no obstetric complaints and denies pelvic pain, cramping/contractions, vaginal bleeding, loss of fluid, or decreased fetal movement.       The following portions of the patient's history were reviewed and updated as appropriate: allergies, current medications, past family history, past medical history, obstetric history, gynecologic history, past social history, past surgical history and problem list.      Objective:  /82   Wt 131 kg (288 lb 12.8 oz)   LMP 2024 (Approximate)   BMI 45.23 kg/m²   Pregravid Weight/BMI: 126 kg (278 lb) (BMI 43.53)  Current Weight: 131 kg (288 lb 12.8 oz)   Total Weight Gain: 4.899 kg (10 lb 12.8 oz)   Pre- Vitals      Flowsheet Row Most Recent Value   Prenatal Assessment    Fetal Heart Rate 134   Movement Present   Prenatal Vitals    Blood Pressure 122/82   Weight - Scale 131 kg (288 lb 12.8 oz)   Urine Albumin/Glucose    Dilation/Effacement/Station    Vaginal Drainage    Edema    LLE Edema None   RLE Edema None                Physical Exam:    General: Well appearing, no distress  Respiratory: Unlabored breathing  Cardiovascular: Regular rate.  Abdomen: Soft, gravid, nontender  Fundal Height: Appropriate for gestational age.  Extremities: Warm and well perfused.  Non tender.      Josue De La Vega MD

## 2025-02-27 NOTE — LETTER
NST sleeve cover sheet    Patient name: Zee Bay  : 1990  MRN: [id number]    [place]: Estimated Date of Delivery: 25    Obstetrician: [facility]    Reason(s) for testing: cHTN (meds) BMI cl III    Testing frequency:    ___  2x/wk  _x_  1x/wk  ___  Dopplers  ___  BPP?      Last growth scan: __________, _________, _________    Baby:      Male   /   Female   /   [place]             Baby Name: ___________________            IOL or  C/S: _____________________

## 2025-02-27 NOTE — PROGRESS NOTES
Non-Stress Testing:    Non-Stress test, equipment, procedure, and expected outcomes explained. Reviewed fetal kick counts and when to call OB.Verified patient understanding of fetal kick counts with teach back method. Patient reports feeling daily fetal movements. Patient has no questions or concerns.     Reviewed non-stress test with Dr. Gonzalez in-person

## 2025-03-07 ENCOUNTER — ULTRASOUND (OUTPATIENT)
Dept: PERINATAL CARE | Facility: OTHER | Age: 35
End: 2025-03-07
Payer: COMMERCIAL

## 2025-03-07 VITALS
WEIGHT: 285 LBS | SYSTOLIC BLOOD PRESSURE: 124 MMHG | DIASTOLIC BLOOD PRESSURE: 84 MMHG | BODY MASS INDEX: 44.73 KG/M2 | HEIGHT: 67 IN | HEART RATE: 89 BPM

## 2025-03-07 DIAGNOSIS — O10.919 CHRONIC HYPERTENSION IN PREGNANCY: ICD-10-CM

## 2025-03-07 DIAGNOSIS — Z3A.33 33 WEEKS GESTATION OF PREGNANCY: Primary | ICD-10-CM

## 2025-03-07 DIAGNOSIS — O99.213 SEVERE OBESITY DUE TO EXCESS CALORIES AFFECTING PREGNANCY IN THIRD TRIMESTER (HCC): ICD-10-CM

## 2025-03-07 DIAGNOSIS — O24.410 DIET CONTROLLED GESTATIONAL DIABETES MELLITUS (GDM) IN THIRD TRIMESTER: ICD-10-CM

## 2025-03-07 DIAGNOSIS — E66.01 SEVERE OBESITY DUE TO EXCESS CALORIES AFFECTING PREGNANCY IN THIRD TRIMESTER (HCC): ICD-10-CM

## 2025-03-07 DIAGNOSIS — Z36.89 ENCOUNTER FOR ULTRASOUND TO ASSESS FETAL GROWTH: ICD-10-CM

## 2025-03-07 PROCEDURE — 99214 OFFICE O/P EST MOD 30 MIN: CPT | Performed by: PHYSICIAN ASSISTANT

## 2025-03-07 PROCEDURE — 59025 FETAL NON-STRESS TEST: CPT | Performed by: PHYSICIAN ASSISTANT

## 2025-03-07 PROCEDURE — 76816 OB US FOLLOW-UP PER FETUS: CPT | Performed by: OBSTETRICS & GYNECOLOGY

## 2025-03-07 NOTE — PROGRESS NOTES
Repeat Non-Stress Testing:    Patient verbalizes +FM. Pt denies ALL:               Leaking of fluid   Contractions   Vaginal bleeding   Decreased fetal movement    Patient is performing daily kick counts. Patient has no questions or concerns.   NST strip reviewed by [place] in-person.

## 2025-03-07 NOTE — PATIENT INSTRUCTIONS
Kick Counts in Pregnancy   AMBULATORY CARE:   Kick counts  measure how much your baby is moving in your womb. A kick from your baby can be felt as a twist, turn, swish, roll, or jab. It is common to feel your baby kicking at 26 to 28 weeks of pregnancy. You may feel your baby kick as early as 20 weeks of pregnancy. You may want to start counting at 28 weeks.   Contact your doctor immediately if:   You feel a change in the number of kicks or movements of your baby.      You feel fewer than 10 kicks within 2 hours.      You have questions or concerns about your baby's movements.     Why measure kick counts:  Your baby's movement may provide information about your baby's health. He or she may move less, or not at all, if there are problems. Your baby may move less if he or she is not getting enough oxygen or nutrition from the placenta. Do not smoke while you are pregnant. Smoking decreases the amount of oxygen that gets to your baby. Talk to your healthcare provider if you need help to quit smoking. Tell your healthcare provider as soon as you feel a change in your baby's movements.  When to measure kick counts:   Measure kick counts at the same time every day.       Measure kick counts when your baby is awake and most active. Your baby may be most active in the evening.     How to measure kick counts:  Check that your baby is awake before you measure kick counts. You can wake up your baby by lightly pushing on your belly, walking, or drinking something cold. Your healthcare provider may tell you different ways to measure kick counts. You may be told to do the following:  Use a chart or clock to keep track of the time you start and finish counting.      Sit in a chair or lie on your left side.      Place your hands on the largest part of your belly.      Count until you reach 10 kicks. Write down how much time it takes to count 10 kicks.      It may take 30 minutes to 2 hours to count 10 kicks. It should not take more  than 2 hours to count 10 kicks.     Follow up with your doctor as directed:  Write down your questions so you remember to ask them during your visits.   ©  [employer]2023 Information is for End User's use only and may not be sold, redistributed or otherwise used for commercial purposes.  The above information is an  only. It is not intended as medical advice for individual conditions or treatments. Talk to your doctor, nurse or pharmacist before following any medical regimen to see if it is safe and effective for you. Thank you for choosing us for your  care today.  If you have any questions about your ultrasound or care, please do not hesitate to contact us or your primary obstetrician.        Some general instructions for your pregnancy are:    Exercise: Aim for 150 minutes per week of regular exercise.  Walking is great!  Nutrition: Choose healthy sources of calcium, iron, and protein.  Avoid ultraprocessed foods and added sugar.  Learn about Preeclampsia: preeclampsia is a common, potentially serious high blood pressure complication in pregnancy.  A blood pressure of 140mmHg (systolic or top number) or 90mmHg (diastolic or bottom number) should be evaluated by your doctor.  Aspirin is sometimes prescribed in early pregnancy to prevent preeclampsia in women with risk factors - ask your obstetrician if you should be on this medication.  For more resources, visit:  https://www.highriskpregnancyinfo.org/preeclampsia  If you smoke, please try to quit completely but also try to reduce your smoking by as much as possible (as soon as possible).  Do not vape.  Please also avoid cannabis products.  Other warning signs to watch out for in pregnancy or postpartum: chest pain, obstructed breathing or shortness of breath, seizures, thoughts of hurting yourself or your baby, bleeding, a painful or swollen leg, fever, or headache (see AWHONN POST-BIRTH Warning Signs campaign).  If these happen  call 911.  Itching is also not normal in pregnancy and if you experience this, especially over your hands and feet, potentially worse at night, notify your doctors.     Thank you for choosing us for your  care today.  If you have any questions about your ultrasound or care, please do not hesitate to contact us or your primary obstetrician.        Some general instructions for your pregnancy are:    Exercise: Aim for 150 minutes per week of regular exercise.  Walking is great!  Nutrition: Choose healthy sources of calcium, iron, and protein.  Avoid ultraprocessed foods and added sugar.  Learn about Preeclampsia: preeclampsia is a common, potentially serious high blood pressure complication in pregnancy.  A blood pressure of 140mmHg (systolic or top number) or 90mmHg (diastolic or bottom number) should be evaluated by your doctor.  Aspirin is sometimes prescribed in early pregnancy to prevent preeclampsia in women with risk factors - ask your obstetrician if you should be on this medication.  For more resources, visit:  https://www.highriskpregnancyinfo.org/preeclampsia  If you smoke, please try to quit completely but also try to reduce your smoking by as much as possible (as soon as possible).  Do not vape.  Please also avoid cannabis products.  Other warning signs to watch out for in pregnancy or postpartum: chest pain, obstructed breathing or shortness of breath, seizures, thoughts of hurting yourself or your baby, bleeding, a painful or swollen leg, fever, or headache (see AWHONN POST-BIRTH Warning Signs campaign).  If these happen call 911.  Itching is also not normal in pregnancy and if you experience this, especially over your hands and feet, potentially worse at night, notify your doctors.

## 2025-03-07 NOTE — PROGRESS NOTES
[facility]:     Ms. Bay was seen today at 33w1d gestational age for NST (found under the pregnancy episode). I reviewed the RN assessment and agree. She was also seen for growth assessment (see ultrasound report under OB procedures tab).     Natalia Carter PA-C

## 2025-03-10 ENCOUNTER — ROUTINE PRENATAL (OUTPATIENT)
Dept: OBGYN CLINIC | Facility: CLINIC | Age: 35
End: 2025-03-10

## 2025-03-10 ENCOUNTER — TELEPHONE (OUTPATIENT)
Dept: OBGYN CLINIC | Facility: MEDICAL CENTER | Age: 35
End: 2025-03-10

## 2025-03-10 VITALS — WEIGHT: 288.8 LBS | DIASTOLIC BLOOD PRESSURE: 86 MMHG | BODY MASS INDEX: 45.23 KG/M2 | SYSTOLIC BLOOD PRESSURE: 120 MMHG

## 2025-03-10 DIAGNOSIS — O24.410 DIET CONTROLLED GESTATIONAL DIABETES MELLITUS (GDM) IN THIRD TRIMESTER: ICD-10-CM

## 2025-03-10 DIAGNOSIS — O10.919 CHRONIC HYPERTENSION IN PREGNANCY: Primary | ICD-10-CM

## 2025-03-10 PROCEDURE — PNV: Performed by: STUDENT IN AN ORGANIZED HEALTH CARE EDUCATION/TRAINING PROGRAM

## 2025-03-10 NOTE — PROGRESS NOTES
Name: Zee Bay      : 1990      MRN: [id number]  Encounter Provider: Annie Huang MD  Encounter Date: 3/10/2025   [facility]  :  Assessment & Plan  Chronic hypertension in pregnancy  - Well controlled on labetalol 100 mg BID  - MFM recommends delivery between 37-39w6d due to A1GDM, we sent schedulign request for [id number] with Dr. Sanders per patient preference         Diet controlled gestational diabetes mellitus (GDM) in third trimester    Lab Results   Component Value Date    HGBA1C 5.2 10/22/2024                History of Present Illness   The patient denies leakage of fluid, pelvic pain, or vaginal bleeding.  Reports normal fetal movement.        Zee Bay is a 34 y.o. female who presents for routine prenatal care.    History obtained from: patient    Review of Systems   Constitutional:  Negative for chills and fever.   Respiratory:  Negative for cough and shortness of breath.    Cardiovascular:  Negative for chest pain and leg swelling.   Gastrointestinal:  Negative for abdominal pain, nausea and vomiting.   Genitourinary:  Negative for dysuria, frequency and urgency.   Neurological:  Negative for dizziness, light-headedness and headaches.     Medical History Reviewed by provider this encounter:     .     Objective   /86   Wt 131 kg (288 lb 12.8 oz)   LMP 2024 (Approximate)   BMI 45.23 kg/m²      Pre-Yassine Vitals      Flowsheet Row Most Recent Value   Prenatal Assessment    Fetal Heart Rate 139   Movement Present   Prenatal Vitals    Blood Pressure 120/86   Weight - Scale 131 kg (288 lb 12.8 oz)   Urine Albumin/Glucose    Dilation/Effacement/Station    Vaginal Drainage    Edema    LLE Edema None   RLE Edema None            Physical Exam  Constitutional:       Appearance: Normal appearance.   HENT:      Head: Normocephalic and atraumatic.   Cardiovascular:      Rate and Rhythm: Normal rate.   Pulmonary:       Effort: Pulmonary effort is normal.   Skin:     General: Skin is warm.   Neurological:      General: No focal deficit present.      Mental Status: She is alert.   Psychiatric:         Mood and Affect: Mood normal.               Annie Huang MD  [facility]  [facility]  3/10/2025 9:07 AM

## 2025-03-10 NOTE — TELEPHONE ENCOUNTER
IOL scheduled 4/15 at 8 PM at [facility]. Chatty message sent to patient notifying IOL has been scheduled.     ----- Message from Annie Huang MD sent at 3/10/2025  7:56 AM EDT -----  Regarding: IOL CHTN and A1GDM  Procedure to be scheduled (IOL or CS): IOL    [place]: Estimated Date of Delivery: 4/24/25     Indication for delivery: Gestational diabetes and Hypertension    Physician preference if available: Marilyn    Cervical Exam - will check again closer to delivery    If IOL, anticipated method: cervical ripening agents or pitocin    AM or PM IOL? PM - 4/15 into 4/16 with Marilyn (delivered her first)

## 2025-03-10 NOTE — ASSESSMENT & PLAN NOTE
- Well controlled on labetalol 100 mg BID  - [facility] recommends delivery between 37-39w6d due to A1GDM, we sent schedulign request for [id number] with Dr. Sanders per patient preference

## 2025-03-14 ENCOUNTER — ULTRASOUND (OUTPATIENT)
Dept: PERINATAL CARE | Facility: OTHER | Age: 35
End: 2025-03-14
Payer: COMMERCIAL

## 2025-03-14 VITALS
WEIGHT: 284.4 LBS | HEART RATE: 98 BPM | SYSTOLIC BLOOD PRESSURE: 124 MMHG | DIASTOLIC BLOOD PRESSURE: 64 MMHG | BODY MASS INDEX: 44.64 KG/M2 | HEIGHT: 67 IN

## 2025-03-14 DIAGNOSIS — O10.919 CHRONIC HYPERTENSION IN PREGNANCY: ICD-10-CM

## 2025-03-14 DIAGNOSIS — E66.01 SEVERE OBESITY DUE TO EXCESS CALORIES AFFECTING PREGNANCY IN THIRD TRIMESTER (HCC): ICD-10-CM

## 2025-03-14 DIAGNOSIS — O99.213 SEVERE OBESITY DUE TO EXCESS CALORIES AFFECTING PREGNANCY IN THIRD TRIMESTER (HCC): ICD-10-CM

## 2025-03-14 DIAGNOSIS — Z3A.34 34 WEEKS GESTATION OF PREGNANCY: ICD-10-CM

## 2025-03-14 PROCEDURE — 76815 OB US LIMITED FETUS(S): CPT | Performed by: NURSE PRACTITIONER

## 2025-03-14 PROCEDURE — 59025 FETAL NON-STRESS TEST: CPT | Performed by: NURSE PRACTITIONER

## 2025-03-14 PROCEDURE — 99213 OFFICE O/P EST LOW 20 MIN: CPT | Performed by: NURSE PRACTITIONER

## 2025-03-14 NOTE — PROGRESS NOTES
Repeat Non-Stress Testing:    Patient verbalizes +FM. Pt denies ALL:               Leaking of fluid   Contractions   Vaginal bleeding   Decreased fetal movement    Patient is performing daily kick counts. Patient has no questions or concerns.   NST strip reviewed by OPHELIA Avilez in-person.

## 2025-03-14 NOTE — PROGRESS NOTES
"[facility]: Ms. Bay was seen today at 34w1d gestational age for NST (found under the pregnancy episode) which I reviewed the RN assessment and agree, and SAMY (see ultrasound report under OB procedures tab).  See ultrasound report under \"OB Procedures\" tab.    Rosy JACINTO    I spent 12 minutes devoted to patient care (4 min chart preparation, 4 minutes face to face and 4 minutes documenting).    "

## 2025-03-18 ENCOUNTER — TELEPHONE (OUTPATIENT)
Dept: OBGYN CLINIC | Facility: MEDICAL CENTER | Age: 35
End: 2025-03-18

## 2025-03-18 NOTE — TELEPHONE ENCOUNTER
3RD TRIMESTER CHECK-IN CALL      Overall how are you feeling? Patient reports to be doing well. Has IOL scheduled for 4/15.     Compliant with routine OB appointments? Yes.     Have you completed your 3rd trimester lab work? Yes. A1GDM. Following up with [facility].     Have you reviewed the contents of 3rd trimester folder from office? Yes.                Have you decided on a pediatrician? [facility]- Dr. Linda Smith in [place]                            Questions on paperwork to go back to office? Birth plan and support person forms returned.  Questions on the baby birth certificate forms? Not at this time.

## 2025-03-21 ENCOUNTER — ULTRASOUND (OUTPATIENT)
Dept: PERINATAL CARE | Facility: OTHER | Age: 35
End: 2025-03-21
Payer: COMMERCIAL

## 2025-03-21 VITALS
HEART RATE: 98 BPM | DIASTOLIC BLOOD PRESSURE: 68 MMHG | SYSTOLIC BLOOD PRESSURE: 104 MMHG | HEIGHT: 67 IN | BODY MASS INDEX: 44.42 KG/M2 | WEIGHT: 283 LBS

## 2025-03-21 DIAGNOSIS — O10.919 CHRONIC HYPERTENSION IN PREGNANCY: Primary | ICD-10-CM

## 2025-03-21 DIAGNOSIS — Z3A.35 35 WEEKS GESTATION OF PREGNANCY: ICD-10-CM

## 2025-03-21 DIAGNOSIS — O99.213 SEVERE OBESITY DUE TO EXCESS CALORIES AFFECTING PREGNANCY IN THIRD TRIMESTER (HCC): ICD-10-CM

## 2025-03-21 DIAGNOSIS — E66.01 SEVERE OBESITY DUE TO EXCESS CALORIES AFFECTING PREGNANCY IN THIRD TRIMESTER (HCC): ICD-10-CM

## 2025-03-21 PROCEDURE — 76815 OB US LIMITED FETUS(S): CPT | Performed by: NURSE PRACTITIONER

## 2025-03-21 PROCEDURE — 59025 FETAL NON-STRESS TEST: CPT | Performed by: NURSE PRACTITIONER

## 2025-03-21 PROCEDURE — 99213 OFFICE O/P EST LOW 20 MIN: CPT | Performed by: NURSE PRACTITIONER

## 2025-03-21 NOTE — PROGRESS NOTES
Repeat Non-Stress Testing:    Patient verbalizes +FM. Pt denies ALL:               Leaking of fluid   Contractions   Vaginal bleeding   Decreased fetal movement    Patient is performing daily kick counts. Patient has no questions or concerns.   NST strip reviewed by OPHELIA Avliez in-person.

## 2025-03-21 NOTE — PROGRESS NOTES
"[facility]: Ms. Bay was seen today at 35w1d gestational age for NST (found under the pregnancy episode) which I reviewed the RN assessment and agree, and SAMY (see ultrasound report under OB procedures tab).  See ultrasound report under \"OB Procedures\" tab.    Rosy JACINTO    I spent 10 minutes devoted to patient care (3 min chart preparation, 4 minutes face to face and 3 minutes documenting).    "

## 2025-03-27 PROBLEM — O99.210 OBESITY IN PREGNANCY, ANTEPARTUM: Status: ACTIVE | Noted: 2025-03-27

## 2025-03-28 ENCOUNTER — ULTRASOUND (OUTPATIENT)
Dept: PERINATAL CARE | Facility: OTHER | Age: 35
End: 2025-03-28
Payer: COMMERCIAL

## 2025-03-28 ENCOUNTER — TELEPHONE (OUTPATIENT)
Dept: OBGYN CLINIC | Facility: MEDICAL CENTER | Age: 35
End: 2025-03-28

## 2025-03-28 ENCOUNTER — ROUTINE PRENATAL (OUTPATIENT)
Dept: OBGYN CLINIC | Facility: CLINIC | Age: 35
End: 2025-03-28

## 2025-03-28 VITALS — DIASTOLIC BLOOD PRESSURE: 82 MMHG | WEIGHT: 283.6 LBS | SYSTOLIC BLOOD PRESSURE: 122 MMHG | BODY MASS INDEX: 44.42 KG/M2

## 2025-03-28 VITALS
DIASTOLIC BLOOD PRESSURE: 74 MMHG | HEART RATE: 97 BPM | HEIGHT: 67 IN | WEIGHT: 282.5 LBS | SYSTOLIC BLOOD PRESSURE: 128 MMHG | BODY MASS INDEX: 44.34 KG/M2

## 2025-03-28 DIAGNOSIS — O24.410 DIET CONTROLLED GESTATIONAL DIABETES MELLITUS (GDM) IN THIRD TRIMESTER: ICD-10-CM

## 2025-03-28 DIAGNOSIS — O99.210 OBESITY IN PREGNANCY, ANTEPARTUM: ICD-10-CM

## 2025-03-28 DIAGNOSIS — O99.213 SEVERE OBESITY DUE TO EXCESS CALORIES AFFECTING PREGNANCY IN THIRD TRIMESTER (HCC): ICD-10-CM

## 2025-03-28 DIAGNOSIS — Z3A.36 36 WEEKS GESTATION OF PREGNANCY: ICD-10-CM

## 2025-03-28 DIAGNOSIS — Z34.93 THIRD TRIMESTER PREGNANCY: Primary | ICD-10-CM

## 2025-03-28 DIAGNOSIS — E66.01 SEVERE OBESITY DUE TO EXCESS CALORIES AFFECTING PREGNANCY IN THIRD TRIMESTER (HCC): ICD-10-CM

## 2025-03-28 DIAGNOSIS — O10.919 CHRONIC HYPERTENSION IN PREGNANCY: ICD-10-CM

## 2025-03-28 DIAGNOSIS — Z36.89 ENCOUNTER FOR ULTRASOUND TO ASSESS FETAL GROWTH: ICD-10-CM

## 2025-03-28 DIAGNOSIS — O10.919 CHRONIC HYPERTENSION IN PREGNANCY: Primary | ICD-10-CM

## 2025-03-28 PROCEDURE — 87150 DNA/RNA AMPLIFIED PROBE: CPT | Performed by: OBSTETRICS & GYNECOLOGY

## 2025-03-28 PROCEDURE — PNV: Performed by: OBSTETRICS & GYNECOLOGY

## 2025-03-28 PROCEDURE — 76816 OB US FOLLOW-UP PER FETUS: CPT | Performed by: OBSTETRICS & GYNECOLOGY

## 2025-03-28 PROCEDURE — 59025 FETAL NON-STRESS TEST: CPT | Performed by: NURSE PRACTITIONER

## 2025-03-28 PROCEDURE — 99214 OFFICE O/P EST MOD 30 MIN: CPT | Performed by: NURSE PRACTITIONER

## 2025-03-28 NOTE — LETTER
"   Date: 3/28/2025    Josue De La Vega MD  [address]  [address]  [facility] [address]    Patient: Zee Bay   YOB: 1990   Date of Visit: 3/28/2025   Gestational age 36w1d   Nature of this communication: Routine       This patient was seen recently in our  office.  Please see ultrasound report under \"OB Procedures\" tab.  Please don't hesitate to contact our office with any concerns or questions.      Sincerely,      Melody Monreal MD  Attending Physician, Maternal-Fetal Medicine  [facility]    "

## 2025-03-28 NOTE — PROGRESS NOTES
"[facility]: Zee Bay was seen today at 36w1d for fetal growth assessment ultrasound.  See ultrasound report under \"OB Procedures\" tab.  Please don't hesitate to contact our office with any concerns or questions.  -Melody Monreal MD    "

## 2025-03-28 NOTE — PROGRESS NOTES
"[facility]: Ms. Bay was seen today at 36w1d gestational age for NST (found under the pregnancy episode) which I reviewed the RN assessment and agree, and fetal growth ultrasound (see ultrasound report under OB procedures tab).  See ultrasound report under \"OB Procedures\" tab.    Rosy JACINTO    "

## 2025-03-28 NOTE — PROGRESS NOTES
Assessment  34 y.o.  at 36w1d presenting for routine prenatal visit.     Plan  Diagnoses and all orders for this visit:    Third trimester pregnancy  36 weeks gestation of pregnancy  -     PTL precautions  - FKC  - Strep B DNA probe, amplification  - Return in 1wk for PN    Chronic hypertension in pregnancy  - Continue labetalol  - Ongoing weekly APFS    Diet controlled gestational diabetes mellitus (GDM) in third trimester  - Continue dietary modifications, glucose checking, and reporting  - Follows with MFM for growth    Obesity affecting pregnancy in third trimester (HCC)  - Follows with MFM for growth    ____________________________________________________________        Subjective    Zee Bay is a 34 y.o.  at 36w1d who presents for routine prenatal visit. She is without complaint. She denies regular contractions, loss of fluid, or vaginal bleeding. She feels regular fetal movements.     Pregnancy Problems:  Patient Active Problem List   Diagnosis    COVID-19 vaccine series completed    History of gestational diabetes in prior pregnancy, currently pregnant in second trimester    36 weeks gestation of pregnancy    Severe obesity due to excess calories affecting pregnancy in third trimester (HCC)    Chronic hypertension in pregnancy    Abnormal glucose tolerance test (GTT) during pregnancy, antepartum    GDM, class A1    Gestational diabetes mellitus (GDM) in third trimester    Obesity in pregnancy, antepartum         Objective  /82   Wt 129 kg (283 lb 9.6 oz)   LMP 2024 (Approximate)   BMI 44.42 kg/m²     FHT: 131 BPM   Uterine Size: size equals dates   Presentations: cephalic   Pelvic Exam:     Dilation: 1cm    Effacement: Thick    Station:  -3    Consistency: soft    Position: middle     Physical Exam:  Physical Exam  Constitutional:       General: She is not in acute distress.     Appearance: Normal appearance. She is well-developed. She is not ill-appearing,  toxic-appearing or diaphoretic.   HENT:      Head: Normocephalic and atraumatic.   Eyes:      General: No scleral icterus.        Right eye: No discharge.         Left eye: No discharge.      Conjunctiva/sclera: Conjunctivae normal.   Pulmonary:      Effort: Pulmonary effort is normal. No accessory muscle usage or respiratory distress.   Abdominal:      General: There is distension (gravid).      Tenderness: There is no abdominal tenderness. There is no guarding or rebound.   Skin:     General: Skin is warm and dry.      Coloration: Skin is not jaundiced.      Findings: No bruising, erythema or rash.   Neurological:      Mental Status: She is alert.   Psychiatric:         Mood and Affect: Mood normal.         Behavior: Behavior normal.         Thought Content: Thought content normal.         Judgment: Judgment normal.

## 2025-03-30 LAB — GP B STREP DNA SPEC QL NAA+PROBE: NEGATIVE

## 2025-04-02 ENCOUNTER — ROUTINE PRENATAL (OUTPATIENT)
Dept: OBGYN CLINIC | Facility: CLINIC | Age: 35
End: 2025-04-02

## 2025-04-02 VITALS — WEIGHT: 281.4 LBS | SYSTOLIC BLOOD PRESSURE: 120 MMHG | DIASTOLIC BLOOD PRESSURE: 80 MMHG | BODY MASS INDEX: 44.07 KG/M2

## 2025-04-02 DIAGNOSIS — O24.410 DIET CONTROLLED GESTATIONAL DIABETES MELLITUS (GDM) IN THIRD TRIMESTER: ICD-10-CM

## 2025-04-02 DIAGNOSIS — Z3A.37 37 WEEKS GESTATION OF PREGNANCY: ICD-10-CM

## 2025-04-02 DIAGNOSIS — O99.213 SEVERE OBESITY DUE TO EXCESS CALORIES AFFECTING PREGNANCY IN THIRD TRIMESTER (HCC): Primary | ICD-10-CM

## 2025-04-02 DIAGNOSIS — E66.01 SEVERE OBESITY DUE TO EXCESS CALORIES AFFECTING PREGNANCY IN THIRD TRIMESTER (HCC): Primary | ICD-10-CM

## 2025-04-02 DIAGNOSIS — O10.919 CHRONIC HYPERTENSION IN PREGNANCY: ICD-10-CM

## 2025-04-02 PROCEDURE — PNV: Performed by: OBSTETRICS & GYNECOLOGY

## 2025-04-02 NOTE — ASSESSMENT & PLAN NOTE
Cont the check in the [facility] center     AC is 92% -at 3/28/25  We discussed should dystocia in detail     When comparing the last pregnancy US to this pregnancy US the baby AC was a  little different 87 to 90 and over all only a few oz different        Lab Results   Component Value Date    HGBA1C 5.2 10/22/2024

## 2025-04-02 NOTE — PROGRESS NOTES
Routine Prenatal Visit  [facility] [address] Debbie James PA      OB/GYN Prenatal Visit    ASSESSMENT / PLAN:  1. Severe obesity due to excess calories affecting pregnancy in third trimester (HCC)  Assessment & Plan:  Starting BMI is 43  Will get NST for CHTN   Asa till 36 weeks    NST are stating at [facility] already       2. Diet controlled gestational diabetes mellitus (GDM) in third trimester  Assessment & Plan:  Cont the check in the DM center     AC is 92% -at 3/28/25  We discussed should dystocia in detail     When comparing the last pregnancy US to this pregnancy US the baby AC was a  little different 87 to 90 and over all only a few oz different        Lab Results   Component Value Date    HGBA1C 5.2 10/22/2024     3. Chronic hypertension in pregnancy  Assessment & Plan:  Baseline labs are normal   Currently on labetalol 100 BID     4. 37 weeks gestation of pregnancy  Assessment & Plan:  NIPT and AFP are normal         SUBJECTIVE:  Zee Bay is a 34 y.o.  at 37 here for prenatal visit.  She has no obstetric complaints and denies pelvic pain, cramping/contractions, vaginal bleeding, loss of fluid, or decreased fetal movement.       The following portions of the patient's history were reviewed and updated as appropriate: allergies, current medications, past family history, past medical history, obstetric history, gynecologic history, past social history, past surgical history and problem list.      Objective:  /80   Wt 128 kg (281 lb 6.4 oz)   LMP 2024 (Approximate)   BMI 44.07 kg/m²   Pregravid Weight/BMI: 126 kg (278 lb) (BMI 43.53)  Current Weight: 128 kg (281 lb 6.4 oz)   Total Weight Gain: 1.542 kg (3 lb 6.4 oz)   Pre-Yassine Vitals      Flowsheet Row Most Recent Value   Prenatal Assessment    Fetal Heart Rate 134   Movement Present   Prenatal Vitals    Blood Pressure 120/80   Weight - Scale 128 kg (281 lb 6.4 oz)   Urine Albumin/Glucose     Dilation/Effacement/Station    Vaginal Drainage    Draining Fluid No   Edema    LLE Edema None               Physical Exam:    General: Well appearing, no distress  Respiratory: Unlabored breathing  Cardiovascular: Regular rate.  Abdomen: Soft, gravid, nontender  Fundal Height: Appropriate for gestational age.  Extremities: Warm and well perfused.  Non tender.      Josue De La Vega MD

## 2025-04-04 ENCOUNTER — ULTRASOUND (OUTPATIENT)
Dept: PERINATAL CARE | Facility: OTHER | Age: 35
End: 2025-04-04
Payer: COMMERCIAL

## 2025-04-04 VITALS
WEIGHT: 281.4 LBS | HEIGHT: 67 IN | HEART RATE: 94 BPM | DIASTOLIC BLOOD PRESSURE: 78 MMHG | BODY MASS INDEX: 44.17 KG/M2 | SYSTOLIC BLOOD PRESSURE: 116 MMHG

## 2025-04-04 DIAGNOSIS — O10.919 CHRONIC HYPERTENSION IN PREGNANCY: Primary | ICD-10-CM

## 2025-04-04 DIAGNOSIS — O99.213 SEVERE OBESITY DUE TO EXCESS CALORIES AFFECTING PREGNANCY IN THIRD TRIMESTER (HCC): ICD-10-CM

## 2025-04-04 DIAGNOSIS — Z3A.36 36 WEEKS GESTATION OF PREGNANCY: ICD-10-CM

## 2025-04-04 DIAGNOSIS — E66.01 SEVERE OBESITY DUE TO EXCESS CALORIES AFFECTING PREGNANCY IN THIRD TRIMESTER (HCC): ICD-10-CM

## 2025-04-04 PROCEDURE — 76815 OB US LIMITED FETUS(S): CPT | Performed by: PHYSICIAN ASSISTANT

## 2025-04-04 PROCEDURE — 59025 FETAL NON-STRESS TEST: CPT | Performed by: PHYSICIAN ASSISTANT

## 2025-04-04 PROCEDURE — 99213 OFFICE O/P EST LOW 20 MIN: CPT | Performed by: PHYSICIAN ASSISTANT

## 2025-04-04 NOTE — PROGRESS NOTES
[facility]:     Ms. Bay was seen today at 37w1d gestational age for NST (found under the pregnancy episode). I reviewed the RN assessment and agree. She was also seen for SAMY (see ultrasound report under OB procedures tab).     Natalia Carter PA-C      I spent 10 minutes devoted to patient care (3 min chart preparation, 4 minutes face to face and 3 minutes documenting).

## 2025-04-09 ENCOUNTER — ROUTINE PRENATAL (OUTPATIENT)
Dept: OBGYN CLINIC | Facility: CLINIC | Age: 35
End: 2025-04-09
Payer: COMMERCIAL

## 2025-04-09 VITALS — BODY MASS INDEX: 44.2 KG/M2 | SYSTOLIC BLOOD PRESSURE: 116 MMHG | WEIGHT: 282.2 LBS | DIASTOLIC BLOOD PRESSURE: 64 MMHG

## 2025-04-09 DIAGNOSIS — Z3A.37 37 WEEKS GESTATION OF PREGNANCY: ICD-10-CM

## 2025-04-09 DIAGNOSIS — E66.01 SEVERE OBESITY DUE TO EXCESS CALORIES AFFECTING PREGNANCY IN THIRD TRIMESTER (HCC): Primary | ICD-10-CM

## 2025-04-09 DIAGNOSIS — O99.213 SEVERE OBESITY DUE TO EXCESS CALORIES AFFECTING PREGNANCY IN THIRD TRIMESTER (HCC): Primary | ICD-10-CM

## 2025-04-09 DIAGNOSIS — O10.919 CHRONIC HYPERTENSION IN PREGNANCY: ICD-10-CM

## 2025-04-09 DIAGNOSIS — Z34.93 THIRD TRIMESTER PREGNANCY: ICD-10-CM

## 2025-04-09 DIAGNOSIS — O24.410 DIET CONTROLLED GESTATIONAL DIABETES MELLITUS (GDM) IN THIRD TRIMESTER: ICD-10-CM

## 2025-04-09 PROCEDURE — 76815 OB US LIMITED FETUS(S): CPT | Performed by: ADVANCED PRACTICE MIDWIFE

## 2025-04-09 PROCEDURE — PNV: Performed by: ADVANCED PRACTICE MIDWIFE

## 2025-04-09 PROCEDURE — 59025 FETAL NON-STRESS TEST: CPT | Performed by: ADVANCED PRACTICE MIDWIFE

## 2025-04-09 NOTE — ASSESSMENT & PLAN NOTE
Currently on labetalol 100 mg 2 times daily  Weekly  fetal surveillance  Induction of labor scheduled for 4/15/2025

## 2025-04-09 NOTE — ASSESSMENT & PLAN NOTE
NIPT and AFP are normal   Reviewed signs and symptoms of labor, [facility]  NST/SAMY today- reactive/ 14.7  Induction of labor scheduled for 4/15/2025

## 2025-04-09 NOTE — PROGRESS NOTES
Name: Zee Bay      : 1990      MRN: [id number]  Encounter Provider: Romy Kendrick CNM  Encounter Date: 2025   Encounter department: [facility] [place]  :  Assessment & Plan  Severe obesity due to excess calories affecting pregnancy in third trimester (HCC)  Starting BMI is 43  Will get NST for CHTN   Asa till 36 weeks    NST /SAMY today- reactive/14.7           Diet controlled gestational diabetes mellitus (GDM) in third trimester  - Reports sugars to Diabetes in Pregnancy       Lab Results   Component Value Date    HGBA1C 5.2 10/22/2024            Chronic hypertension in pregnancy  Currently on labetalol 100 mg 2 times daily  Weekly  fetal surveillance  Induction of labor scheduled for 4/15/2025         Third trimester pregnancy         37 weeks gestation of pregnancy  NIPT and AFP are normal   Reviewed signs and symptoms of labor, [facility]  NST/SAMY today- reactive/ 14.7  Induction of labor scheduled for 4/15/2025             History of Present Illness   Zee Bay is a 34 y.o.  female who presents for routine prenatal care at 37w6d.  Pregnancy ROS: no leakage of fluid, pelvic pain, or vaginal bleeding.  Good fetal movement.    Objective:  /64   Wt 128 kg (282 lb 3.2 oz)   LMP 2024 (Approximate)   BMI 44.20 kg/m²   Pregravid Weight/BMI: 126 kg (278 lb) (BMI 43.53)  Current Weight: 128 kg (282 lb 3.2 oz)   Total Weight Gain: 1.905 kg (4 lb 3.2 oz)   Pre- Vitals    Flowsheet Row Most Recent Value   Prenatal Assessment    Fetal Heart Rate 140   Movement Present   Prenatal Vitals    Blood Pressure 116/64   Weight - Scale 128 kg (282 lb 3.2 oz)   Urine Albumin/Glucose    Dilation/Effacement/Station    Vaginal Drainage    Edema    LLE Edema None   RLE Edema None            Zee Bay is a 34 y.o. female who presents prenatal visit, NST/SAMY  History obtained from: patient    Review of Systems   Constitutional:  Negative for  chills and fever.   Respiratory:  Negative for cough and shortness of breath.    Cardiovascular:  Negative for chest pain and palpitations.   Genitourinary:  Negative for difficulty urinating and vaginal bleeding.   Psychiatric/Behavioral:  The patient is not nervous/anxious.      Medical History Reviewed by provider this encounter:     .  Current Outpatient Medications on File Prior to Visit   Medication Sig Dispense Refill    Blood Glucose Monitoring Suppl (Contour Next EZ) w/Device KIT Test x4 Daily or as instructed 1 kit 0    Contour Next Test test strip Test 4 Times Daily or as instructed 100 strip 5    labetalol (NORMODYNE) 100 mg tablet Take 1 tablet (100 mg total) by mouth 2 (two) times a day 180 tablet 3    Microlet Lancets MISC Use 4 a Day or as instructed 100 each 5    Prenatal MV & Min w/FA-DHA (ONE A DAY PRENATAL PO)       Elastic Bandages & Supports (Lumbosacral Supp Abdominal XL) MISC Wear device when active, take off at rest (Patient not taking: Reported on 4/2/2025) 1 each 0     No current facility-administered medications on file prior to visit.         Objective   /64   Wt 128 kg (282 lb 3.2 oz)   LMP 07/11/2024 (Approximate)   BMI 44.20 kg/m²      Physical Exam  Vitals reviewed.   Constitutional:       Appearance: Normal appearance.   Pulmonary:      Effort: Pulmonary effort is normal.   Abdominal:      Comments: Gravid uterus   Neurological:      Mental Status: She is alert and oriented to person, place, and time.   Psychiatric:         Mood and Affect: Mood normal.         Behavior: Behavior normal.

## 2025-04-09 NOTE — ASSESSMENT & PLAN NOTE
Starting BMI is 43  Will get NST for CHTN   Asa till 36 weeks    NST /SAMY today- reactive/14.7

## 2025-04-15 ENCOUNTER — HOSPITAL ENCOUNTER (OUTPATIENT)
Dept: LABOR AND DELIVERY | Facility: HOSPITAL | Age: 35
Discharge: HOME | End: 2025-04-15
Payer: COMMERCIAL

## 2025-04-15 ENCOUNTER — HOSPITAL ENCOUNTER (INPATIENT)
Facility: HOSPITAL | Age: 35
LOS: 3 days | Discharge: HOME | End: 2025-04-18
Attending: OBSTETRICS & GYNECOLOGY | Admitting: OBSTETRICS & GYNECOLOGY
Payer: COMMERCIAL

## 2025-04-15 DIAGNOSIS — Z3A.38 38 WEEKS GESTATION OF PREGNANCY: Primary | ICD-10-CM

## 2025-04-15 LAB
ALBUMIN SERPL BCG-MCNC: 3.6 G/DL (ref 3.5–5)
ALP SERPL-CCNC: 111 U/L (ref 34–104)
ALT SERPL W P-5'-P-CCNC: 142 U/L (ref 7–52)
ANION GAP SERPL CALCULATED.3IONS-SCNC: 9 MMOL/L (ref 4–13)
AST SERPL W P-5'-P-CCNC: 53 U/L (ref 13–39)
BILIRUB SERPL-MCNC: 0.31 MG/DL (ref 0.2–1)
BUN SERPL-MCNC: 13 MG/DL (ref 5–25)
CALCIUM SERPL-MCNC: 9.2 MG/DL (ref 8.4–10.2)
CHLORIDE SERPL-SCNC: 109 MMOL/L (ref 96–108)
CO2 SERPL-SCNC: 19 MMOL/L (ref 21–32)
CREAT SERPL-MCNC: 0.59 MG/DL (ref 0.6–1.3)
CREAT UR-MCNC: 57.1 MG/DL
ERYTHROCYTE [DISTWIDTH] IN BLOOD BY AUTOMATED COUNT: 14.2 % (ref 11.6–15.1)
GFR SERPL CREATININE-BSD FRML MDRD: 120 ML/MIN/1.73SQ M
GLUCOSE SERPL-MCNC: 90 MG/DL (ref 65–140)
GLUCOSE SERPL-MCNC: 94 MG/DL (ref 65–140)
HCT VFR BLD AUTO: 35.9 % (ref 34.8–46.1)
HGB BLD-MCNC: 12.3 G/DL (ref 11.5–15.4)
MCH RBC QN AUTO: 29.7 PG (ref 26.8–34.3)
MCHC RBC AUTO-ENTMCNC: 34.3 G/DL (ref 31.4–37.4)
MCV RBC AUTO: 87 FL (ref 82–98)
PLATELET # BLD AUTO: 233 THOUSANDS/UL (ref 149–390)
PMV BLD AUTO: 10.5 FL (ref 8.9–12.7)
POTASSIUM SERPL-SCNC: 3.6 MMOL/L (ref 3.5–5.3)
PROT SERPL-MCNC: 6.9 G/DL (ref 6.4–8.4)
PROT UR-MCNC: 8.7 MG/DL
PROT/CREAT UR: 0.2 MG/G{CREAT}
RBC # BLD AUTO: 4.14 MILLION/UL (ref 3.81–5.12)
SODIUM SERPL-SCNC: 137 MMOL/L (ref 135–147)
WBC # BLD AUTO: 12.23 THOUSAND/UL (ref 4.31–10.16)

## 2025-04-15 PROCEDURE — 82570 ASSAY OF URINE CREATININE: CPT

## 2025-04-15 PROCEDURE — 86850 RBC ANTIBODY SCREEN: CPT

## 2025-04-15 PROCEDURE — 84156 ASSAY OF PROTEIN URINE: CPT

## 2025-04-15 PROCEDURE — 86901 BLOOD TYPING SEROLOGIC RH(D): CPT

## 2025-04-15 PROCEDURE — 85027 COMPLETE CBC AUTOMATED: CPT

## 2025-04-15 PROCEDURE — 86900 BLOOD TYPING SEROLOGIC ABO: CPT

## 2025-04-15 PROCEDURE — 86780 TREPONEMA PALLIDUM: CPT

## 2025-04-15 PROCEDURE — 82948 REAGENT STRIP/BLOOD GLUCOSE: CPT

## 2025-04-15 PROCEDURE — NC001 PR NO CHARGE: Performed by: OBSTETRICS & GYNECOLOGY

## 2025-04-15 PROCEDURE — 80053 COMPREHEN METABOLIC PANEL: CPT

## 2025-04-15 RX ORDER — BUPIVACAINE HYDROCHLORIDE 2.5 MG/ML
30 INJECTION, SOLUTION EPIDURAL; INFILTRATION; INTRACAUDAL; PERINEURAL ONCE AS NEEDED
Status: DISCONTINUED | OUTPATIENT
Start: 2025-04-15 | End: 2025-04-16

## 2025-04-15 RX ORDER — ONDANSETRON 2 MG/ML
4 INJECTION INTRAMUSCULAR; INTRAVENOUS EVERY 6 HOURS PRN
Status: DISCONTINUED | OUTPATIENT
Start: 2025-04-15 | End: 2025-04-16

## 2025-04-15 RX ORDER — LABETALOL 100 MG/1
100 TABLET, FILM COATED ORAL 2 TIMES DAILY
Status: DISCONTINUED | OUTPATIENT
Start: 2025-04-15 | End: 2025-04-18 | Stop reason: HOSPADM

## 2025-04-15 RX ORDER — SODIUM CHLORIDE, SODIUM LACTATE, POTASSIUM CHLORIDE, CALCIUM CHLORIDE 600; 310; 30; 20 MG/100ML; MG/100ML; MG/100ML; MG/100ML
125 INJECTION, SOLUTION INTRAVENOUS CONTINUOUS
Status: DISCONTINUED | OUTPATIENT
Start: 2025-04-15 | End: 2025-04-16

## 2025-04-15 RX ADMIN — Medication 25 MCG: at 21:15

## 2025-04-16 ENCOUNTER — ANESTHESIA EVENT (INPATIENT)
Dept: ANESTHESIOLOGY | Facility: HOSPITAL | Age: 35
End: 2025-04-16
Payer: COMMERCIAL

## 2025-04-16 ENCOUNTER — ANESTHESIA (INPATIENT)
Dept: ANESTHESIOLOGY | Facility: HOSPITAL | Age: 35
End: 2025-04-16
Payer: COMMERCIAL

## 2025-04-16 LAB
ALBUMIN SERPL BCG-MCNC: 3.6 G/DL (ref 3.5–5)
ALP SERPL-CCNC: 107 U/L (ref 34–104)
ALT SERPL W P-5'-P-CCNC: 143 U/L (ref 7–52)
ANION GAP SERPL CALCULATED.3IONS-SCNC: 9 MMOL/L (ref 4–13)
AST SERPL W P-5'-P-CCNC: 56 U/L (ref 13–39)
BASE EXCESS BLDCOA CALC-SCNC: -4 MMOL/L (ref 3–11)
BASE EXCESS BLDCOV CALC-SCNC: -2.7 MMOL/L (ref 1–9)
BILIRUB SERPL-MCNC: 0.4 MG/DL (ref 0.2–1)
BUN SERPL-MCNC: 12 MG/DL (ref 5–25)
CALCIUM SERPL-MCNC: 9 MG/DL (ref 8.4–10.2)
CHLORIDE SERPL-SCNC: 108 MMOL/L (ref 96–108)
CO2 SERPL-SCNC: 20 MMOL/L (ref 21–32)
CREAT SERPL-MCNC: 0.54 MG/DL (ref 0.6–1.3)
GFR SERPL CREATININE-BSD FRML MDRD: 124 ML/MIN/1.73SQ M
GLUCOSE SERPL-MCNC: 63 MG/DL (ref 65–140)
GLUCOSE SERPL-MCNC: 69 MG/DL (ref 65–140)
GLUCOSE SERPL-MCNC: 76 MG/DL (ref 65–140)
HAV IGM SER QL: NORMAL
HBV CORE IGM SER QL: NORMAL
HBV SURFACE AG SER QL: NORMAL
HCO3 BLDCOA-SCNC: 20.9 MMOL/L (ref 17.3–27.3)
HCO3 BLDCOV-SCNC: 20.9 MMOL/L (ref 12.2–28.6)
HCV AB SER QL: NORMAL
O2 CT VFR BLDCOA CALC: 13.6 ML/DL
OXYHGB MFR BLDCOA: 61.2 %
OXYHGB MFR BLDCOV: 79.8 %
PCO2 BLDCOA: 38 MM[HG] (ref 30–60)
PCO2 BLDCOV: 33.5 MM HG (ref 27–43)
PH BLDCOA: 7.36 [PH] (ref 7.23–7.43)
PH BLDCOV: 7.41 [PH] (ref 7.19–7.49)
PO2 BLDCOA: 24.2 MM HG (ref 5–25)
PO2 BLDCOV: 33.7 MM HG (ref 15–45)
POTASSIUM SERPL-SCNC: 3.7 MMOL/L (ref 3.5–5.3)
PROT SERPL-MCNC: 6.8 G/DL (ref 6.4–8.4)
SAO2 % BLDCOV: 18.1 ML/DL
SODIUM SERPL-SCNC: 137 MMOL/L (ref 135–147)
TREPONEMA PALLIDUM IGG+IGM AB [PRESENCE] IN SERUM OR PLASMA BY IMMUNOASSAY: NORMAL

## 2025-04-16 PROCEDURE — 80074 ACUTE HEPATITIS PANEL: CPT

## 2025-04-16 PROCEDURE — 88307 TISSUE EXAM BY PATHOLOGIST: CPT | Performed by: PATHOLOGY

## 2025-04-16 PROCEDURE — 59400 OBSTETRICAL CARE: CPT | Performed by: OBSTETRICS & GYNECOLOGY

## 2025-04-16 PROCEDURE — 82948 REAGENT STRIP/BLOOD GLUCOSE: CPT

## 2025-04-16 PROCEDURE — 80053 COMPREHEN METABOLIC PANEL: CPT

## 2025-04-16 PROCEDURE — NC001 PR NO CHARGE: Performed by: NURSE PRACTITIONER

## 2025-04-16 PROCEDURE — 82805 BLOOD GASES W/O2 SATURATION: CPT | Performed by: OBSTETRICS & GYNECOLOGY

## 2025-04-16 PROCEDURE — 4A1H7CZ MONITORING OF PRODUCTS OF CONCEPTION, CARDIAC RATE, VIA NATURAL OR ARTIFICIAL OPENING: ICD-10-PCS | Performed by: OBSTETRICS & GYNECOLOGY

## 2025-04-16 RX ORDER — SIMETHICONE 80 MG
80 TABLET,CHEWABLE ORAL 4 TIMES DAILY PRN
Status: DISCONTINUED | OUTPATIENT
Start: 2025-04-16 | End: 2025-04-18 | Stop reason: HOSPADM

## 2025-04-16 RX ORDER — ACETAMINOPHEN 325 MG/1
650 TABLET ORAL EVERY 6 HOURS
Status: DISCONTINUED | OUTPATIENT
Start: 2025-04-16 | End: 2025-04-18 | Stop reason: HOSPADM

## 2025-04-16 RX ORDER — POLYETHYLENE GLYCOL 3350 17 G/17G
17 POWDER, FOR SOLUTION ORAL DAILY
Status: DISCONTINUED | OUTPATIENT
Start: 2025-04-16 | End: 2025-04-18 | Stop reason: HOSPADM

## 2025-04-16 RX ORDER — IBUPROFEN 600 MG/1
600 TABLET, FILM COATED ORAL EVERY 6 HOURS
Status: DISCONTINUED | OUTPATIENT
Start: 2025-04-16 | End: 2025-04-18 | Stop reason: HOSPADM

## 2025-04-16 RX ORDER — DIPHENHYDRAMINE HCL 25 MG
25 TABLET ORAL EVERY 6 HOURS PRN
Status: DISCONTINUED | OUTPATIENT
Start: 2025-04-16 | End: 2025-04-18 | Stop reason: HOSPADM

## 2025-04-16 RX ORDER — ONDANSETRON 2 MG/ML
4 INJECTION INTRAMUSCULAR; INTRAVENOUS EVERY 8 HOURS PRN
Status: DISCONTINUED | OUTPATIENT
Start: 2025-04-16 | End: 2025-04-18 | Stop reason: HOSPADM

## 2025-04-16 RX ORDER — BENZOCAINE/MENTHOL 6 MG-10 MG
1 LOZENGE MUCOUS MEMBRANE DAILY PRN
Status: DISCONTINUED | OUTPATIENT
Start: 2025-04-16 | End: 2025-04-18 | Stop reason: HOSPADM

## 2025-04-16 RX ORDER — CALCIUM CARBONATE 500 MG/1
1000 TABLET, CHEWABLE ORAL DAILY PRN
Status: DISCONTINUED | OUTPATIENT
Start: 2025-04-16 | End: 2025-04-18 | Stop reason: HOSPADM

## 2025-04-16 RX ORDER — OXYTOCIN/RINGER'S LACTATE 30/500 ML
250 PLASTIC BAG, INJECTION (ML) INTRAVENOUS ONCE
Status: DISCONTINUED | OUTPATIENT
Start: 2025-04-16 | End: 2025-04-18 | Stop reason: HOSPADM

## 2025-04-16 RX ORDER — OXYTOCIN/RINGER'S LACTATE 30/500 ML
1-30 PLASTIC BAG, INJECTION (ML) INTRAVENOUS
Status: DISCONTINUED | OUTPATIENT
Start: 2025-04-16 | End: 2025-04-16

## 2025-04-16 RX ADMIN — SODIUM CHLORIDE, SODIUM LACTATE, POTASSIUM CHLORIDE, AND CALCIUM CHLORIDE 125 ML/HR: .6; .31; .03; .02 INJECTION, SOLUTION INTRAVENOUS at 02:19

## 2025-04-16 RX ADMIN — IBUPROFEN 600 MG: 600 TABLET ORAL at 12:09

## 2025-04-16 RX ADMIN — ACETAMINOPHEN 650 MG: 325 TABLET, FILM COATED ORAL at 12:09

## 2025-04-16 RX ADMIN — SODIUM CHLORIDE, SODIUM LACTATE, POTASSIUM CHLORIDE, AND CALCIUM CHLORIDE 999 ML/HR: .6; .31; .03; .02 INJECTION, SOLUTION INTRAVENOUS at 05:52

## 2025-04-16 RX ADMIN — IBUPROFEN 600 MG: 600 TABLET ORAL at 18:22

## 2025-04-16 RX ADMIN — ACETAMINOPHEN 650 MG: 325 TABLET, FILM COATED ORAL at 06:37

## 2025-04-16 RX ADMIN — Medication 2 MILLI-UNITS/MIN: at 02:19

## 2025-04-16 RX ADMIN — SODIUM CHLORIDE, SODIUM LACTATE, POTASSIUM CHLORIDE, AND CALCIUM CHLORIDE 125 ML/HR: .6; .31; .03; .02 INJECTION, SOLUTION INTRAVENOUS at 06:43

## 2025-04-16 RX ADMIN — LABETALOL HYDROCHLORIDE 100 MG: 100 TABLET, FILM COATED ORAL at 18:22

## 2025-04-16 RX ADMIN — POLYETHYLENE GLYCOL 3350 17 G: 17 POWDER, FOR SOLUTION ORAL at 09:23

## 2025-04-16 RX ADMIN — IBUPROFEN 600 MG: 600 TABLET ORAL at 23:56

## 2025-04-16 RX ADMIN — BENZOCAINE AND LEVOMENTHOL 1 APPLICATION: 200; 5 SPRAY TOPICAL at 09:23

## 2025-04-16 RX ADMIN — ACETAMINOPHEN 650 MG: 325 TABLET, FILM COATED ORAL at 18:22

## 2025-04-16 RX ADMIN — LABETALOL HYDROCHLORIDE 100 MG: 100 TABLET, FILM COATED ORAL at 06:37

## 2025-04-16 RX ADMIN — IBUPROFEN 600 MG: 600 TABLET ORAL at 06:37

## 2025-04-16 RX ADMIN — ACETAMINOPHEN 650 MG: 325 TABLET, FILM COATED ORAL at 23:56

## 2025-04-16 NOTE — PLAN OF CARE
Problem: POSTPARTUM  Goal: Experiences normal postpartum course  Description: INTERVENTIONS:- Monitor maternal vital signs- Assess uterine involution and lochia  Outcome: Progressing  Goal: Appropriate maternal -  bonding  Description: INTERVENTIONS:- Identify family support- Assess for appropriate maternal/infant bonding -Encourage maternal/infant bonding opportunities- Referral to  or  as needed  Outcome: Progressing  Goal: Establishment of infant feeding pattern  Description: INTERVENTIONS:- Assess breast/bottle feeding- Refer to lactation as needed  Outcome: Progressing  Goal: Incision(s), wounds(s) or drain site(s) healing without S/S of infection  Description: INTERVENTIONS- Assess and document dressing, incision, wound bed, drain sites and surrounding tissue- Provide patient and family education- Perform skin care/dressing changes every       Outcome: Progressing

## 2025-04-16 NOTE — ANESTHESIA PREPROCEDURE EVALUATION
Procedure:  LABOR ANALGESIA    Relevant Problems   CARDIO   (+) Chronic hypertension in pregnancy      GYN   (+) 38 weeks gestation of pregnancy      Obstetrics/Gynecology   (+) Gestational diabetes mellitus (GDM) in third trimester   (+) Obesity in pregnancy, antepartum   (+) Severe obesity due to excess calories affecting pregnancy in third trimester (HCC)        Physical Exam    Airway    Mallampati score: II         Dental   No notable dental hx     Cardiovascular  Cardiovascular exam normal    Pulmonary  Pulmonary exam normal     Other Findings  post-pubertal.      Anesthesia Plan  ASA Score- 3     Anesthesia Type- epidural with ASA Monitors.         Additional Monitors:     Airway Plan:            Plan Factors-    Chart reviewed.                      Induction-     Postoperative Plan-     Perioperative Resuscitation Plan - Level 1 - Full Code.       Informed Consent- Anesthetic plan and risks discussed with patient.        NPO Status:  Vitals Value Taken Time   Date of last liquid 04/15/25 04/15/25 2011   Time of last liquid 1830 04/15/25 2011   Date of last solid 04/15/25 04/15/25 2011   Time of last solid 1830 04/15/25 2011

## 2025-04-16 NOTE — PLAN OF CARE
Problem: ANTEPARTUM  Goal: Maintain pregnancy as long as maternal and/or fetal condition is stable  Description: INTERVENTIONS:- Maternal surveillance- Fetal surveillance- Monitor uterine activity- Medications as ordered- Bedrest  2025 by Renetta Causey  Outcome: Completed  4/15/2025 2101 by Renetta Causey  Outcome: Progressing     Problem: BIRTH - VAGINAL/ SECTION  Goal: Fetal and maternal status remain reassuring during the birth process  Description: INTERVENTIONS:- Monitor vital signs- Monitor fetal heart rate- Monitor uterine activity- Monitor labor progression (vaginal delivery)- DVT prophylaxis- Antibiotic prophylaxis  2025 by Renetta Causey  Outcome: Completed  4/15/2025 2101 by Renetta Causey  Outcome: Progressing  Goal: Emotionally satisfying birthing experience for mother/fetus  Description: Interventions:- Assess, plan, implement and evaluate the nursing care given to the patient in labor- Advocate the philosophy that each childbirth experience is a unique experience and support the family's chosen level of involvement and control during the labor process - Actively participate in both the patient's and family's teaching of the birth process- Consider cultural, Roman Catholic and age-specific factors and plan care for the patient in labor  2025 by Renetta Causey  Outcome: Completed  4/15/2025 2101 by Renetta Causey  Outcome: Progressing     Problem: POSTPARTUM  Goal: Experiences normal postpartum course  Description: INTERVENTIONS:- Monitor maternal vital signs- Assess uterine involution and lochia  Outcome: Progressing  Goal: Appropriate maternal -  bonding  Description: INTERVENTIONS:- Identify family support- Assess for appropriate maternal/infant bonding -Encourage maternal/infant bonding opportunities- Referral to  or  as needed  Outcome: Progressing  Goal: Establishment of infant feeding pattern  Description: INTERVENTIONS:-  Assess breast/bottle feeding- Refer to lactation as needed  Outcome: Progressing  Goal: Incision(s), wounds(s) or drain site(s) healing without S/S of infection  Description: INTERVENTIONS- Assess and document dressing, incision, wound bed, drain sites and surrounding tissue- Provide patient and family education  Outcome: Progressing

## 2025-04-16 NOTE — ASSESSMENT & PLAN NOTE
Up to date on prenatal care  Admit to OBGYN for induction of labor in the setting of CHTN and A1GDM  Diabetic clear liquid diet, IVF LR 125cc/hr   F/u T&S, CBC, RPR   GBS negative; EFW: 75% at 36w1d   SVE: 3/20/-3, posterior, firm - Parnell score of 2  Continuous fetal monitoring and tocometry   Analgesia at maternal request   Vertex by TAUS  Contraception plan: OCPs at 6 weeks  Plan: cytotec, followed by pitocin

## 2025-04-16 NOTE — OB LABOR/OXYTOCIN SAFETY PROGRESS
Labor Progress Note - Zee Bay 34 y.o. female MRN: [id number]    Unit/Bed#: -01 Encounter: [id number]       Contraction Frequency (minutes): 3-5  Contraction Intensity: Mild  Uterine Activity Characteristics: Irregular  Cervical Dilation: 3-4        Cervical Effacement: 50  Fetal Station: -3  Baseline Rate (FHR): 125 bpm  Fetal Heart Rate (FHT): 120 BPM  FHR Category: 1               Vital Signs:   Vitals:    04/15/25 2215   BP: 129/71   Pulse: 77   Resp:    Temp:        Notes/comments:   SVE as above. Membrane sweep performed. Will start pitocin. Category I tracing. D/w Dr. Kelly Keita MD 4/16/2025 1:55 AM

## 2025-04-16 NOTE — ASSESSMENT & PLAN NOTE
Lab Results   Component Value Date    HGBA1C 5.2 10/22/2024     Recent Labs     04/15/25  2042   POCGLU 94     Blood Sugar Average: Last 72 hrs:  (P) 94    POC glucose every 4 hours in cervical ripening, every 2 hours in latent labor, every 1 hour in active labor  Will start insulin infusion if needed

## 2025-04-16 NOTE — DISCHARGE SUMMARY
Discharge Summary - OB/GYN   Name: Zee Bay 34 y.o. female I MRN: [id number]  Unit/Bed#: -01 I Date of Admission: 4/15/2025   Date of Service: 2025 I Hospital Day: 1    ADMISSION  [facility]  Admission Date: 4/15/2025   Admitting Attending: Dr. Mendoza  Admitting Diagnoses:   38 weeks gestation of pregnancy  A1GDM  Chronic hypertension   Transaminitis  Obesity      DELIVERY  Delivery Method: Vaginal, Spontaneous   Delivery Date and Time: 2025 5:55 AM  Delivery Attending: Dr. Mendoza    DISCHARGE  Discharge Date: 25  Discharge Attending: Dr. Dr. Delgado  Discharge Diagnosis:   Same, Delivered    Clinical course: Admission to Delivery  Zee Bay is a 34 y.o.  who was admitted at 38w6d for induction of labor in the setting of A1GDM and Chronic hypertension. Her blood pressures were controlled outpatient with Labetalol 100mg BID. On admission, she was noted to have a transaminitis which was stable on repeat labs, and platelets were normal, p:c ratio 0.2. On exam, she was noted to be 3/20/-3 and was given a dose of cytotec. She made change to 3.5/50/-3 and a membrane sweep was performed and pitocin was started. She ruptured her membranes spontaneously and noted to be completed dilated. She began pushing.      Delivery  Route of Delivery: Vaginal, Spontaneous    Anesthesia: None,   QBL: Non-Surgical QBL (mL): 182        Delivery: Vaginal, Spontaneous at 2025 5:55 AM  Laceration: None    Baby's Weight:  ;      Apgar scores:   and   at 1 and 5 minutes, respectively    Clinical Course: Post-Delivery:  The post delivery course was unremarkable.    On the day of discharge, the patient was ambulating, voiding spontaneously, tolerating oral intake, and hemodynamically stable. She was able to reasonably perform all ADLs. She had appropriate bowel function. Pain was well-controlled. She was discharged home on postpartum/postop day #2 without complications.  Patient was instructed to follow up with her OB as an outpatient and was given appropriate warnings to call her provider with problems or concerns.    Pertinent lab findings included:   Blood type B+.     Last three Hgb values:  Lab Results   Component Value Date    HGB 12.3 04/15/2025    HGB 12.4 2025    HGB 12.7 10/16/2024        Problem-specific follow-up plans included the following:  Assessment & Plan   (spontaneous vaginal delivery)  ; admit Hgb 12.3  Routine PP care   Lochia WNL   Recovering well   Appropriate bowel and bladder function   Pain well controlled   Tolerating diet   Breastfeeding   Ambulating without issues   No lower extremity tenderness  PP contraception: OCPs  Chronic hypertension in pregnancy  Continue home Labetalol 100mg BID  Admission CBC wnl, CMP with AST/ALT 53/142, p:c ratio 0.2  AST/ALT remained stable. Hepatitis panel wnl  Continue to monitor pressures  Asymptomatic   GDM, class A1  2hr GTT 6 weeks post partum   Obesity in pregnancy, antepartum  BMI 44.2      Discharge med list:  Contraception: OCPs at 6 weeks     Medication List      CONTINUE taking these medications     Contour Next EZ w/Device Kit; Test x4 Daily or as instructed   Lumbosacral Supp Abdominal XL Misc; Wear device when active, take off at   rest   Microlet Lancets Misc; Use 4 a Day or as instructed     ASK your doctor about these medications     Contour Next Test test strip; Generic drug: glucose blood; Test 4 Times   Daily or as instructed   labetalol 100 mg tablet; Commonly known as: NORMODYNE; Take 1 tablet   (100 mg total) by mouth 2 (two) times a day   ONE A DAY PRENATAL PO       Condition at discharge:   good     Disposition:   Home    Planned Readmission:   No    OPHELIA Torres

## 2025-04-16 NOTE — ASSESSMENT & PLAN NOTE
Continue home Labetalol 100mg BID  Admission CBC wnl, CMP with AST/ALT 53/142, p:c ratio 0.2  Continue to monitor pressures

## 2025-04-16 NOTE — OB LABOR/OXYTOCIN SAFETY PROGRESS
Oxytocin Safety Progress Check Note - Zee Bay 34 y.o. female MRN: [id number]    Unit/Bed#: -01 Encounter: [id number]    Dose (bruce-units/min) Oxytocin: 8 bruce-units/min  Contraction Frequency (minutes): 1-5  Contraction Intensity: Mild  Uterine Activity Characteristics: Irregular  Cervical Dilation: 3-4        Cervical Effacement: 50  Fetal Station: -3  Baseline Rate (FHR): 120 bpm  Fetal Heart Rate (FHT): 115 BPM  FHR Category: 1               Vital Signs:   Vitals:    04/16/25 0326   BP: 138/63   Pulse: 61   Resp:    Temp:        Notes/comments:   SVE deferred, patient has been on pitocin for 2 hours. Category I tracing. Continue pitocin titration.       Mini Keita MD 4/16/2025 4:18 AM

## 2025-04-16 NOTE — H&P
February 5, 2020     Referral [address] [facility] [id number]    Patient: Mohsen Negron   YOB: 1963   Date of Visit: 2/5/2020       Dear Dr Kia Navas:    Thank you for referring Chhaya Dotson to me for evaluation  Below are my notes for this consultation  If you have questions, please do not hesitate to call me  I look forward to following your patient along with you  Sincerely,        Yo Hayes MD        CC: MD Yo Lovell MD  2/5/2020  9:40 AM  Sign at close encounter  Consultation - [facility]  Mohsen Negron 55 y o  male MRN: [id number]  Unit/Bed#:  Encounter: [id number]        Consults    ASSESSMENT/PLAN:     1  Colon cancer screening-increased risk secondary to personal history of colon polyps  His previous colonoscopy was over 3 years ago, patient reports numerous polyps  -will schedule repeat colonoscopy at this time  Patient was explained about  the risks and benefits of the procedure  Risks including but not limited to bleeding, infection, perforation were explained in detail  Also explained about less than 100% sensitivity with the exam and other alternatives  -will obtain results of previous colonoscopy  2  GERD-symptoms likely aggravated in setting of hiatal hernia or decreased LES pressure  He has had several bouts of black color stools although I suspect this is likely from Pepto-Bismol use  Nonetheless, he needs to have repeat CBC at this time  Also check TSH   -start on omeprazole 40 mg to be taken 30 minutes before dinner as symptoms are predominantly at nighttime  Patient was explained about the lifestyle and dietary modifications  Advised to avoid fatty foods, chocolates, caffeine, alcohol and any other triggering foods  Avoid eating for at least 3 hours before going to bed  -will plan for repeat EGD to assess for Scruggs's given longstanding symptoms of acid reflux   -avoid NSAIDs      3  H&P - OB/GYN   Name: Zee Bay 34 y.o. female I MRN: [id number]  Unit/Bed#: -01 I Date of Admission: 4/15/2025   Date of Service: 4/15/2025 I Hospital Day: 0     Assessment & Plan  38 weeks gestation of pregnancy  Up to date on prenatal care  Admit to OBGYN for induction of labor in the setting of CHTN and A1GDM  Diabetic clear liquid diet, IVF LR 125cc/hr   F/u T&S, CBC, RPR   GBS negative; EFW: 75% at 36w1d   SVE: 3/20/-3, posterior, firm - Parnell score of 2  Continuous fetal monitoring and tocometry   Analgesia at maternal request   Vertex by TAUS  Contraception plan: OCPs at 6 weeks  Plan: cytotec, followed by pitocin  Chronic hypertension in pregnancy  Continue home Labetalol 100mg BID  Admission CBC wnl, CMP with AST/ALT 53/142, p:c ratio 0.2  Continue to monitor pressures  GDM, class A1  Lab Results   Component Value Date    HGBA1C 5.2 10/22/2024     Recent Labs     04/15/25  2042   POCGLU 94     Blood Sugar Average: Last 72 hrs:  (P) 94    POC glucose every 4 hours in cervical ripening, every 2 hours in latent labor, every 1 hour in active labor  Will start insulin infusion if needed  Obesity in pregnancy, antepartum  BMI 44.2    History of Present Illness   Patient of: [facility]  Brief Summary: Zee Bay  with an KENTON of 2025, by Ultrasound at 38w5d presenting for induction in the setting of CHTN and A1GDM.    Chief Complaint: here for induction     HPI:  Contractions: None.   Leakage of fluid: None.   Bleeding: None.   Fetal movement: present.    Pregnancy complications: chronic HTN and class   DM A1.     Review of Systems   Constitutional:  Negative for chills and fever.   HENT: Negative.     Respiratory:  Negative for cough and shortness of breath.    Cardiovascular:  Negative for chest pain.   Gastrointestinal:  Negative for abdominal pain, nausea and vomiting.   Genitourinary: Negative.  Negative for vaginal bleeding.   Musculoskeletal: Negative.     Neurological:  Negative for headaches.   Psychiatric/Behavioral: Negative.         Historical Information   Historical Information   Past Medical History:   Diagnosis Date    History of COVID-19 March 2021-no hospitalization    Hypertension     Varicella     vaccine    Wears glasses      Past Surgical History:   Procedure Laterality Date    KNEE SURGERY Right     MT LAPAROSCOPY SURG CHOLECYSTECTOMY N/A 12/15/2021    Procedure: CHOLECYSTECTOMY LAPAROSCOPIC W/ ROBOTICS;  Surgeon: Shannen New MD;  Location: [facility] OR;  Service: General    WISDOM TOOTH EXTRACTION       Social History     Tobacco Use    Smoking status: Never    Smokeless tobacco: Never   Vaping Use    Vaping status: Never Used   Substance and Sexual Activity    Alcohol use: Not Currently     Comment: social    Drug use: Never    Sexual activity: Yes     Partners: Male     Birth control/protection: None     E-Cigarette/Vaping    E-Cigarette Use Never User      E-Cigarette/Vaping Substances    Nicotine No     THC No     CBD No     Flavoring No     Other No     Unknown No      Social History     Tobacco Use    Smoking status: Never    Smokeless tobacco: Never   Vaping Use    Vaping status: Never Used   Substance and Sexual Activity    Alcohol use: Not Currently     Comment: social    Drug use: Never    Sexual activity: Yes     Partners: Male     Birth control/protection: None     Prior to Admission Medications   Prescriptions Last Dose Informant Patient Reported? Taking?   Blood Glucose Monitoring Suppl (Contour Next EZ) w/Device KIT 4/15/2025  No Yes   Sig: Test x4 Daily or as instructed   Contour Next Test test strip 4/15/2025  No Yes   Sig: Test 4 Times Daily or as instructed   Elastic Bandages & Supports (Lumbosacral Supp Abdominal XL) MISC   No No   Sig: Wear device when active, take off at rest   Patient not taking: Reported on 4/2/2025   Microlet Lancets MISC 4/15/2025  No Yes   Sig: Use 4 a Day or as instructed   Prenatal MV & Min  Mild elevation of transaminitis with ALT of 47, AST is 27  Remainder of the liver function tests are completely normal   -would recommend weight loss, patient BMI is 30  This may be secondary to nonalcoholic fatty liver disease   -repeat LFTs again       ______________________________________________________________________    Reason for Consult / Principal Problem: [unfilled]    HPI: Aaron Welsh is a [address] [facility] o  year old male with history of GERD, hyperlipidemia, hemorrhoids, presents for colon cancer screening evaluation  Patient reports that he has had symptoms of acid reflux for many years, he states that he was previously on AcipHex which controlled the symptoms  He states that he has not been evaluated by GI in the past few years and has not been on any PPI  He states that he has tried over-the-counter Nexium however has not had any relief  He states that acid reflux symptoms are predominantly at nighttime  Denies any dysphagia, hematemesis, coffee-ground emesis but does report several occasions of black colored stools  Patient does report occasional use of Pepto-Bismol as well  No recent CBC in the chart  He reports that he underwent EGD and colonoscopy at the same time 3 years ago by Dr Poppy Ferrera  He reports that he has had polyps in the past, was told that he had 10 polyps at a time  He states that he received a letter from Dr Poppy Ferrera stating as he was a due for repeat colonoscopy  Does not report any NSAID use on regular basis  No change in bowel habits, loss of appetite or unintentional weight loss  Most recent liver function tests are notable for mild elevation of ALT of 47, AST is normal   No recent CBC in the chart  Review of Systems: The remainder of the review of systems was negative except for the pertinent positives noted in HPI       Historical Information   Past Medical History:   Diagnosis Date    Abnormal weight gain     Last assessed 3/24/2014    Hemorrhoids "w/FA-DHA (ONE A DAY PRENATAL PO) 4/15/2025 Morning Self Yes Yes   labetalol (NORMODYNE) 100 mg tablet 4/15/2025 at  6:30 PM  No Yes   Sig: Take 1 tablet (100 mg total) by mouth 2 (two) times a day      Facility-Administered Medications: None     Patient has no known allergies.  OB History    Para Term  AB Living   3 1 1 0 1 1   SAB IAB Ectopic Multiple Live Births   1 0 0 0 1      # Outcome Date GA Lbr Lukas/2nd Weight Sex Type Anes PTL Lv   3 Current            2 SAB 24 6w0d    SAB      1 Term 22 38w0d / 00:22 3360 g (7 lb 6.5 oz) M Vag-Spont EPI N NGHIA     Baby complications/comments: none    Objective :  Temp:  [98.2 °F (36.8 °C)] 98.2 °F (36.8 °C)  HR:  [86] 86  BP: (132)/(78) 132/78  Resp:  [20] 20    Physical Exam  Constitutional:       General: She is not in acute distress.  HENT:      Head: Normocephalic and atraumatic.   Cardiovascular:      Rate and Rhythm: Normal rate.   Pulmonary:      Effort: Pulmonary effort is normal. No respiratory distress.   Abdominal:      General: There is no distension.   Skin:     General: Skin is warm and dry.   Neurological:      General: No focal deficit present.   Psychiatric:         Mood and Affect: Mood normal.          Cervical Exam  3/20/-3  Contractions:  Occasional contractions  Fetal heart rate  Baseline 135bpm / moderate variability / 15 x 15 accelerations / no decelerations    Lab Results: I have reviewed the following results:  Strep Grp B PCR   Date Value Ref Range Status   2025 Negative Negative Final     Comment:           No results found for: \"STREPGPB\"  RPR   Date Value Ref Range Status   2022 Non-Reactive Non-Reactive Final     Hepatitis B Surface Ag   Date Value Ref Range Status   10/16/2024 Non-reactive  Final     No components found for: \"EXTHEPBSAG\"  Hepatitis C Ab   Date Value Ref Range Status   10/16/2024 Non-reactive Non-reactive Final     Rubella IgG Quant   Date Value Ref Range Status   10/16/2024 43.5 >14.9 "  History of gastroesophageal reflux (GERD)     Hyperlipidemia     Last assessed 3/24/2014      No past surgical history on file  Social History   Social History     Substance and Sexual Activity   Alcohol Use No     Social History     Substance and Sexual Activity   Drug Use No     Social History     Tobacco Use   Smoking Status Never Smoker   Smokeless Tobacco Never Used     Family History   Problem Relation Age of Onset    No Known Problems Mother     No Known Problems Father     COPD Maternal Uncle        Meds/Allergies       (Not in a hospital admission)  No current facility-administered medications for this visit  No Known Allergies    Objective     Blood pressure 118/80, pulse 80, temperature (!) 97 2 °F (36 2 °C), resp  rate 18, height 5' 8" (1 727 m), weight 91 4 kg (201 lb 9 6 oz)  [unfilled]    PHYSICAL EXAM     GEN: well nourished, well developed, no acute distress  HEENT: anicteric, MMM, no cervical or supraclavicular lymphadenopathy  CV: RRR, no m/r/g  CHEST: CTA b/l, no WRR  ABD: +BS, soft, NT/ND, no hepatosplenomegaly  EXT: no c/c/e  SKIN: no rashes,  NEURO: aaox3    Lab Results:   No visits with results within 1 Day(s) from this visit     Latest known visit with results is:   Orders Only on 09/21/2019   Component Date Value    Glucose, Random 09/21/2019 102*    BUN 09/21/2019 16     Creatinine 09/21/2019 1 04     eGFR Non  09/21/2019 80     eGFR  09/21/2019 93     SL AMB BUN/CREATININE RA* 09/21/2019 15     Sodium 09/21/2019 139     Potassium 09/21/2019 4 7     Chloride 09/21/2019 101     CO2 09/21/2019 24     CALCIUM 09/21/2019 9 8     Protein, Total 09/21/2019 7 2     Albumin 09/21/2019 4 6     Globulin, Total 09/21/2019 2 6     Albumin/Globulin Ratio 09/21/2019 1 8     TOTAL BILIRUBIN 09/21/2019 1 0     Alk Phos Isoenzymes 09/21/2019 94     AST 09/21/2019 27     ALT 09/21/2019 47*    Cholesterol, Total 09/21/2019 231*    "IU/mL Final     No results found for: \"EXTRUBELIGGQ\"  HIV-1/HIV-2 Ab   Date Value Ref Range Status   03/22/2022 Non-Reactive Non-Reactive Final     No components found for: \"OYOMZL8JN\"  N gonorrhoeae, DNA Probe   Date Value Ref Range Status   10/21/2024 Negative Negative Final   04/04/2022 Negative Negative Final   01/24/2020 Negative Negative Final     Chlamydia trachomatis, DNA Probe   Date Value Ref Range Status   10/21/2024 Negative Negative Final   04/04/2022 Negative Negative Final   01/24/2020 Negative Negative Final       Type & Screen:  ABO Grouping   Date Value Ref Range Status   10/16/2024 B  Final     Rh Factor   Date Value Ref Range Status   10/16/2024 Positive  Final     No results found for: \"ANTIBODYSCR\"  Specimen Expiration Date   Date Value Ref Range Status   10/16/2024 [id number]  Final       Mini Keita MD  OBGYN, PGY-2  04/15/25  9:30 PM    " Triglycerides 09/21/2019 164*    HDL 09/21/2019 34*    LDL Direct 09/21/2019 164*     Imaging Studies: I have personally reviewed pertinent films in PACS

## 2025-04-16 NOTE — PLAN OF CARE
Problem: ANTEPARTUM  Goal: Maintain pregnancy as long as maternal and/or fetal condition is stable  Description: INTERVENTIONS:- Maternal surveillance- Fetal surveillance- Monitor uterine activity- Medications as ordered- Bedrest  Outcome: Progressing     Problem: BIRTH - VAGINAL/ SECTION  Goal: Fetal and maternal status remain reassuring during the birth process  Description: INTERVENTIONS:- Monitor vital signs- Monitor fetal heart rate- Monitor uterine activity- Monitor labor progression (vaginal delivery)- DVT prophylaxis- Antibiotic prophylaxis  Outcome: Progressing  Goal: Emotionally satisfying birthing experience for mother/fetus  Description: Interventions:- Assess, plan, implement and evaluate the nursing care given to the patient in labor- Advocate the philosophy that each childbirth experience is a unique experience and support the family's chosen level of involvement and control during the labor process - Actively participate in both the patient's and family's teaching of the birth process- Consider cultural, Moravian and age-specific factors and plan care for the patient in labor  Outcome: Progressing

## 2025-04-16 NOTE — LACTATION NOTE
This note was copied from a baby's chart.  CONSULT - LACTATION  Baby Boy Bay (Mindy) 0 days male MRN: [id number]    [facility] NURS[facility] Room / Bed: (N)/(N) Encounter: [id number]    Maternal Information     MOTHER:  Zee Bay  Maternal Age: 34 y.o.  OB History: # 1 - Date: 22, Sex: Male, Weight: 3360 g (7 lb 6.5 oz), GA: 38w0d, Type: Vaginal, Spontaneous, Apgar1: 8, Apgar5: 9, Living: Living, Birth Comments: None    # 2 - Date: 24, Sex: None, Weight: None, GA: 6w0d, Type: Spontaneous , Apgar1: None, Apgar5: None, Living: None, Birth Comments: None    # 3 - Date: 25, Sex: Male, Weight: 3400 g (7 lb 7.9 oz), GA: 38w6d, Type: Vaginal, Spontaneous, Apgar1: 8, Apgar5: 9, Living: Living, Birth Comments: None   Previous breast reduction surgery? No    Lactation history:   Has patient previously breast fed: Yes   How long had patient previously breast fed: Breast & bottle 3-4 months   Previous breast feeding complications: Other (Comment) (baby was jaundice had to supplement, never weaned from supplementation.)     Past Surgical History:   Procedure Laterality Date    KNEE SURGERY Right     CO LAPAROSCOPY SURG CHOLECYSTECTOMY N/A 12/15/2021    Procedure: CHOLECYSTECTOMY LAPAROSCOPIC W/ ROBOTICS;  Surgeon: Shannen New MD;  Location: [facility];  Service: General    WISDOM TOOTH EXTRACTION         Birth information:  YOB: 2024   Time of birth: 5:55 AM   Sex: male   Delivery type: Vaginal, Spontaneous   Birth Weight: 3400 g (7 lb 7.9 oz)   Percent of Weight Change: 0%     Gestational Age: 38w6d   [unfilled]    Reason for Consult:    Reason for Consult: Initial assessment (ext) - 20 min, Latch Assess (ext) - 20 min    Risk Factors:    Risk Factors:  (IDM, on glucose protocol)    Breast and nipple assessment:   Breasts/Nipples  Left Breast: Soft  Right Breast: Soft  Left Nipple: Everted  Right Nipple: Everted  Intervention:  Hand expression  Breastfeeding Progress: Not yet established, Breastfeeding well    OB Lactation Tools:         Breast Pump:    Breast Pump  Pump: Personal (Spectra S1 from first pregnancy, Lansinoh Duo Discreet w/ this pregnancy.)       Assessment: normal assessment No oral exam was performed during this time.    Feeding assessment: feeding well with guidance and stimulation.  LATCH:  Latch: Grasps breast, tongue down, lips flanged, rhythmic sucking   Audible Swallowing: A few with stimulation   Type of Nipple: Everted (After stimulation)   Comfort (Breast/Nipple): Soft/non-tender   Hold (Positioning): Partial assist, teach one side, mother does other, staff holds   LATCH Score: 8       Feeding recommendations:  breast feed on demand every 2-3 hours, watching for early feeding cues. At least 8-12 feedings in 24 hours. Notify nurse before breastfeeding to check baby's glucose until protocol is discontinued.    Consulted with Zee for an initial visit. Zee breast fed her first for 3-4 months in combination with supplementation. Patient states that baby is latching well, but is sleepy.   Baby 8 hours old, on glucose protocol, IDM.    Encouraged a latch assessment. Communicated with nurse for next pre-feed glucose check, assisted with feeding baby. Baby's sugars have been stable.  Patient reports she has been mostly breastfeeding in cradle position and would like to try the football position. Baby was brought to breast level, latching with no difficulties.  Stressed importance of good alignment with baby's belly facing mother and baby's ear/shoulder/hip in a straight line.  Emphasized to bring baby to breast and not breast to baby.  Reviewed to align baby's nose to the nipple and allowing for the baby to open wide, with the baby's chin touching the breast first.  Baby's cheeks and chin are touching mother's breast, not seeing the nipple bopping in and out of baby's mouth.  Encouraged areolar compression and  stimulation techniques to keep baby actively sucking at the breast. Baby breast fed on the right breast for 15 minutes before offering the second breast. Baby fed on the left breast in cradle position for an additional 8 minutes.    Discussed that if it feels like baby is continuously pinching at the breast, encouraged to break the suction by putting pinky in the corner of the baby's mouth and relatching the baby.    Discussed different waking techniques to help a sleepy baby. Reviewed checking baby's diaper, sitting baby upright and burping them. Placing baby skin to skin prior to feedings. Stimulating baby's fingers, toes, ears, and chin while they are feeding on the breast to keep them actively sucking. Switching breasts and positions to extend baby's feed and keep baby stimulated. Hand expressing drops of colostrum to help baby wake.    Feeding plan:  Patient is encouraged to breastfeed on demand, every 2-3 hours or when baby showing early feeding cues. Reviewed to offer both breasts during a feed.  Discussed the importance of ensuring that baby feeds 8-12x in 24 hours and not waiting over 3 hours to feed. Educated to watch the baby for hunger and fullness cues.    Discussed baby's second day. Reviewed baby's belly size and cluster feeding. Discussed cluster feeding is a normal baby behavior and helps bring in a good milk supply.     Discussed how to know the baby is feeding adequately at the breast:  -Baby effectively feeding at least 8-12 times in 24 hours.  -Mother feels a comfortable tugging sensation during a feeding.  -Baby is showing fullness cues, post feed.  -Baby is having adequate amounts of diapers and meeting goal diapers.  -Baby's stool is changing from black meconium, to greenish brown to yellow and seedy looking over the first week when exclusively providing breast milk.   -Baby's weight loss is within normal ranges.     Discussed use of feeding log.  Patient was encouraged to continue to  document baby's feedings and outputs to ensure baby is adequately feeding at the breast.     Patient was encouraged to contact lactation for a latch assessment, continued support and/or questions that arise.       NALDO Diggs 4/16/2025 2:53 PM

## 2025-04-16 NOTE — L&D DELIVERY NOTE
Predelivery Diagnosis:  Pregnancy at term  38 6/7 weeks  A1GDM  Chronic HTN    Post-delivery Diagnosis:  Same      Delivering Physician:  Sarah Mendoza M.D.    Assistant:  Dr. Mini Keita    Type of Anesthesia: Epidural    QBL: pending    Specimen:   Arterial cord blood gas  Venous cord blood gas   Cord blood  Placenta for storage    Findings:     Infant Gender: M  Apgars: 8, 9   Presentation: vertex   Position: OA  Restitution:  LOT  Nuchal cord: none  No shoulder dystocia  No meconium staining  Placenta: intact, eccentric cord insertion  Laceration: periurethral abrasion    Anesthesia: none    Gases:  Umbilical Cord Venous Blood Gas:  Results from last 7 days   Lab Units 25  0557   PH COV  7.413   PCO2 COV mm HG 33.5   HCO3 COV mmol/L 20.9   BASE EXC COV mmol/L -2.7*   O2 CT CD VB mL/dL 18.1   O2 HGB, VENOUS CORD % 79.8     Umbilical Cord Arterial Blood Gas:  Results from last 7 days   Lab Units 25  0557   PH COA  7.358   PCO2 COA  38.0   PO2 COA mm HG 24.2   HCO3 COA mmol/L 20.9   BASE EXC COA mmol/L -4.0*   O2 CONTENT CORD ART ml/dl 13.6   O2 HGB, ARTERIAL CORD % 61.2               Delivery      Patient felt an urge to push and noted to be completely dilated.  Patient pushed and delivered a viable infant with weight undetermined at this time .    Delivery was a  to a sterile field and intact perineum.   Fetal head was delivered and restituted spontaneously.    With gentle downward traction the anterior shoulders delivered together with maternal expulsive efforts. With gentle upward traction the posterior shoulders together with maternal expulsive efforts delivered as well as the rest of the body without difficulty.     Infant was bulb suctioned at delivery. Delayed clamping of umbilical cord was performed. Infant was placed on mother's abdomen, and later removed to the warmer by the nurse.      Cord blood gases, both arterial and venous gases and cord blood was sent for analysis.  Intact placenta  with a normal configuration 3-vessel cord was delivered spontaneously.    The uterus was aggressively massaged. All clots were cleared from the lower uterine segment. Oxytocin infusion was started to control postpartum bleeding. Inspection of the perineum revealed no lacerations requiring repair.    She had some periurethral abrasions that did not require repair.    Sponge, instrument and needle count were correct x 2. There were no sponges left in the vagina. Final inspection of the perineum and vagina revealed no further lacerations or tears. There were no complications. Patient tolerated procedure well.

## 2025-04-17 LAB
ABO GROUP BLD: NORMAL
BLD GP AB SCN SERPL QL: NEGATIVE
BLD GP AB SCN SERPL QL: NEGATIVE
RH BLD: POSITIVE
SPECIMEN EXPIRATION DATE: NORMAL

## 2025-04-17 PROCEDURE — 99024 POSTOP FOLLOW-UP VISIT: CPT | Performed by: OBSTETRICS & GYNECOLOGY

## 2025-04-17 RX ADMIN — LABETALOL HYDROCHLORIDE 100 MG: 100 TABLET, FILM COATED ORAL at 06:14

## 2025-04-17 RX ADMIN — LABETALOL HYDROCHLORIDE 100 MG: 100 TABLET, FILM COATED ORAL at 18:15

## 2025-04-17 RX ADMIN — ACETAMINOPHEN 650 MG: 325 TABLET, FILM COATED ORAL at 11:41

## 2025-04-17 RX ADMIN — ACETAMINOPHEN 650 MG: 325 TABLET, FILM COATED ORAL at 06:13

## 2025-04-17 RX ADMIN — ACETAMINOPHEN 650 MG: 325 TABLET, FILM COATED ORAL at 18:15

## 2025-04-17 RX ADMIN — IBUPROFEN 600 MG: 600 TABLET ORAL at 11:41

## 2025-04-17 RX ADMIN — IBUPROFEN 600 MG: 600 TABLET ORAL at 06:13

## 2025-04-17 RX ADMIN — POLYETHYLENE GLYCOL 3350 17 G: 17 POWDER, FOR SOLUTION ORAL at 08:36

## 2025-04-17 RX ADMIN — IBUPROFEN 600 MG: 600 TABLET ORAL at 18:15

## 2025-04-17 NOTE — LACTATION NOTE
This note was copied from a baby's chart.     04/17/25 1100   Lactation Consultation   Reason for Consult 20 m   Lactation Consultant Total Time 20   Maternal Information   Has mother  before? Yes   How long did the the mother previously breastfeed? breast & bottle 3-4 mo   Previous Maternal Breastfeeding Challenges Other (Comment)  (baby was jaundice needing supplementation, never weaned from supplementation)   Infant to breast within first hour of birth? Yes   Exclusive Pump and Bottle Feed No   Breasts/Nipples   Left Breast Soft   Right Breast Soft   Left Nipple Everted;Tender   Right Nipple Everted;Tender   Breastfeeding Progress Not yet established;Breastfeeding well   Breast Pump   Pump 3  (Spectra from first pregnancy, Lansinoh Duo Discreet w/ this pregnancy)   Patient Follow-Up   Lactation Consult Status 2   Follow-Up Type Inpatient;Call as needed   Other OB Lactation Documentation          Followed up with Zee to review breastfeeding progress and address any concerns family may have.     Family reports baby cluster fed overnight. Patient's nipples are tender, denies any blisters or cracks.   Baby brought back to room from circumcision, educated family how baby tends to be sleepy after procedure.  Reviewed waking techniques, encouraged skin to skin.     Feeding plan:  Patient is encouraged to breastfeed on demand, every 2-3 hours or when baby showing early feeding cues. Reviewed to offer both breasts during a feed.  Discussed the importance of ensuring that baby feeds 8-12x in 24 hours and not waiting over 3 hours to feed. Encouraged to watch baby for feeding cues.    Educated on timeline of cluster feeding.  Discussed cluster feeding is a normal baby behavior and helps bring in a good milk supply.     Encouraged family to monitor baby's outputs, ensuring baby is reaching goal diapers. Discussed that soiled diapers transition by changing color from black meconium to yellow/seedy by day  5.    Patient was encouraged to contact lactation for a latch assessment, continued support and/or questions that arise.

## 2025-04-17 NOTE — NURSING NOTE
Maternal and  discharge education completed and printed resources given to parents. Answered questions from parents and shared they can call nurse for any follow up questions that arise. The Save your Life POST BIRTH magnet reviewed in detail and mom acknowledged understanding. Mom stated she liked the education and magnet.

## 2025-04-17 NOTE — PROGRESS NOTES
Progress Note - OB/GYN   Name: Zee Bay 34 y.o. female I MRN: [id number]  Unit/Bed#: -01 I Date of Admission: 4/15/2025   Date of Service: 2025 I Hospital Day: 2    Assessment & Plan   (spontaneous vaginal delivery)  Lochia WNL   Recovering well   Appropriate bowel and bladder function   Pain well controlled   Tolerating diet   Breastfeeding   Ambulating without issues   No lower extremity tenderness  GBS neg   Rh pos    Chronic hypertension in pregnancy  Continue home Labetalol 100mg BID  Admission CBC wnl, CMP with AST/ALT 53/142, p:c ratio 0.2  AST/ALT remained stable. Hepatitis panel wnl  Continue to monitor pressures  Asymptomatic   GDM, class A1  2hr GTT 6 weeks post partum   Obesity in pregnancy, antepartum  BMI 44.2    OB Post-Partum Progress Note  Subjective   Post delivery. Patient is doing well. Lochia WNL. Pain well controlled.     Pain: yes, cramping, improved with meds  Tolerating PO: yes  Voiding: voiding spontaneous without issue   Flatus: yes  BM: no  Ambulating: yes  Breastfeeding:  yes  Chest pain: no  Shortness of breath: no  Leg pain: no  Lochia: WNL    Objective :  Temp:  [97 °F (36.1 °C)-97.9 °F (36.6 °C)] 97.4 °F (36.3 °C)  HR:  [55-88] 63  BP: (114-133)/(55-89) 133/78  Resp:  [18-24] 18  SpO2:  [99 %] 99 %  O2 Device: None (Room air)    Physical Exam:   GEN: Zee Bay appears well, alert and oriented x 3, pleasant and cooperative   CARDIO: RRR, no murmurs or rubs  RESP:  CTAB, no wheezes or rales  ABDOMEN: soft, no tenderness, no distention, fundus firm below umbilicus  EXTREMITIES: SCDs on, non tender, no erythema, b/l Yuriy's sign negative       Lab Results: I have reviewed the following results:  Lab Results   Component Value Date    WBC 12.23 (H) 04/15/2025    HGB 12.3 04/15/2025    HCT 35.9 04/15/2025    MCV 87 04/15/2025     04/15/2025     Noemi Fisher MD  PGY-2 OB/GYN

## 2025-04-17 NOTE — ASSESSMENT & PLAN NOTE
Continue home Labetalol 100mg BID  Admission CBC wnl, CMP with AST/ALT 53/142, p:c ratio 0.2  AST/ALT remained stable. Hepatitis panel wnl  Continue to monitor pressures  Asymptomatic

## 2025-04-18 ENCOUNTER — TELEPHONE (OUTPATIENT)
Dept: OTHER | Facility: HOSPITAL | Age: 35
End: 2025-04-18

## 2025-04-18 ENCOUNTER — TELEPHONE (OUTPATIENT)
Age: 35
End: 2025-04-18

## 2025-04-18 VITALS
HEIGHT: 67 IN | SYSTOLIC BLOOD PRESSURE: 133 MMHG | TEMPERATURE: 98 F | HEART RATE: 59 BPM | OXYGEN SATURATION: 97 % | DIASTOLIC BLOOD PRESSURE: 76 MMHG | BODY MASS INDEX: 44.2 KG/M2 | RESPIRATION RATE: 16 BRPM

## 2025-04-18 PROCEDURE — 99024 POSTOP FOLLOW-UP VISIT: CPT | Performed by: NURSE PRACTITIONER

## 2025-04-18 RX ORDER — LABETALOL 100 MG/1
100 TABLET, FILM COATED ORAL 2 TIMES DAILY
Qty: 60 TABLET | Refills: 1 | Status: SHIPPED | OUTPATIENT
Start: 2025-04-18

## 2025-04-18 RX ORDER — IBUPROFEN 600 MG/1
600 TABLET, FILM COATED ORAL EVERY 6 HOURS PRN
Qty: 30 TABLET | Refills: 0 | Status: SHIPPED | OUTPATIENT
Start: 2025-04-18

## 2025-04-18 RX ORDER — ACETAMINOPHEN 325 MG/1
650 TABLET ORAL EVERY 6 HOURS PRN
Start: 2025-04-18

## 2025-04-18 RX ADMIN — ACETAMINOPHEN 650 MG: 325 TABLET, FILM COATED ORAL at 06:15

## 2025-04-18 RX ADMIN — IBUPROFEN 600 MG: 600 TABLET ORAL at 00:33

## 2025-04-18 RX ADMIN — ACETAMINOPHEN 650 MG: 325 TABLET, FILM COATED ORAL at 00:33

## 2025-04-18 RX ADMIN — LABETALOL HYDROCHLORIDE 100 MG: 100 TABLET, FILM COATED ORAL at 06:15

## 2025-04-18 RX ADMIN — IBUPROFEN 600 MG: 600 TABLET ORAL at 06:15

## 2025-04-18 NOTE — ASSESSMENT & PLAN NOTE
; admit Hgb 12.3  Routine PP care   Lochia WNL   Recovering well   Appropriate bowel and bladder function   Pain well controlled   Tolerating diet   Breastfeeding   Ambulating without issues   No lower extremity tenderness  PP contraception: OCPs

## 2025-04-18 NOTE — ASSESSMENT & PLAN NOTE
Continue home Labetalol 100mg BID  Admission CBC wnl, CMP with AST/ALT 53/142, p:c ratio 0.2  AST/ALT remained stable. Hepatitis panel wnl  Continue to monitor pressures  Asymptomatic   Systolic (24hrs), Av , Min:117 , Max:133   Diastolic (24hrs), Av, Min:77, Max:80

## 2025-04-18 NOTE — PROGRESS NOTES
"Progress Note - OB/GYN   Name: Zee Bay 34 y.o. female I MRN: [id number]  Unit/Bed#: -01 I Date of Admission: 4/15/2025   Date of Service: 2025 I Hospital Day: 3     Assessment & Plan   (spontaneous vaginal delivery)  ; admit Hgb 12.3  Routine PP care   Lochia WNL   Recovering well   Appropriate bowel and bladder function   Pain well controlled   Tolerating diet   Breastfeeding   Ambulating without issues   No lower extremity tenderness  PP contraception: OCPs  Chronic hypertension in pregnancy  Continue home Labetalol 100mg BID  Admission CBC wnl, CMP with AST/ALT 53/142, p:c ratio 0.2  AST/ALT remained stable. Hepatitis panel wnl  Continue to monitor pressures  Asymptomatic   GDM, class A1  2hr GTT 6 weeks post partum   Obesity in pregnancy, antepartum  BMI 44.2    She is PPD# 2 s/p  spontaneous vaginal delivery  Recovering well and is stable         Disposition    - Anticipate discharge home on PPD# 2      Subjective/Objective     Chief Complaint: Postpartum State     Subjective:    Zee Bay is PPD/POD#2 s/p  spontaneous vaginal delivery. She has no current complaints.  Pain is well controlled. She is recovering well and is stable.     Breastfeeding:  yes  Tolerating PO: yes  Nausea or Vomiting: no  Voiding: yes  Flatus: yes; has had bowel movement.   Ambulating: yes  Chest pain: no  Shortness of breath: no  Leg pain: no  Lochia:moderate    Vitals:   /77   Pulse 57   Temp 97.6 °F (36.4 °C) (Oral)   Resp 16   Ht 5' 7\" (1.702 m)   LMP 2024 (Approximate)   SpO2 99%   Breastfeeding Yes   BMI 44.20 kg/m²       Physical Exam:   GEN: Zee Bay appears well, alert and oriented x 3, pleasant and cooperative   CARDIO: RRR, no murmurs or rubs  RESP:  CTAB, no wheezes or rales  ABDOMEN: soft, no tenderness, no distention, fundus @ U  EXTREMITIES: non tender, trace edema, b/l Yuriy's sign negative    Labs:     Hemoglobin   Date Value Ref Range Status "   04/15/2025 12.3 11.5 - 15.4 g/dL Final   01/27/2025 12.4 11.5 - 15.4 g/dL Final     WBC   Date Value Ref Range Status   04/15/2025 12.23 (H) 4.31 - 10.16 Thousand/uL Final   01/27/2025 14.69 (H) 4.31 - 10.16 Thousand/uL Final     Platelets   Date Value Ref Range Status   04/15/2025 233 149 - 390 Thousands/uL Final   01/27/2025 293 149 - 390 Thousands/uL Final     Creatinine   Date Value Ref Range Status   04/16/2025 0.54 (L) 0.60 - 1.30 mg/dL Final     Comment:     Standardized to IDMS reference method   04/15/2025 0.59 (L) 0.60 - 1.30 mg/dL Final     Comment:     Standardized to IDMS reference method     AST   Date Value Ref Range Status   04/16/2025 56 (H) 13 - 39 U/L Final   04/15/2025 53 (H) 13 - 39 U/L Final     ALT   Date Value Ref Range Status   04/16/2025 143 (H) 7 - 52 U/L Final     Comment:     Specimen collection should occur prior to Sulfasalazine administration due to the potential for falsely depressed results.    04/15/2025 142 (H) 7 - 52 U/L Final     Comment:     Specimen collection should occur prior to Sulfasalazine administration due to the potential for falsely depressed results.           OPHELIA Torres  4/18/2025  7:40 AM

## 2025-04-18 NOTE — UTILIZATION REVIEW
"Mother and baby discharged 2025    NOTIFICATION OF INPATIENT ADMISSION   MATERNITY/DELIVERY AUTHORIZATION REQUEST   SERVICING FACILITY:   [facility] - L&D, [employer], NICU  [address] [place] [address]  Tax ID: [id number]  NPI: [id number] ATTENDING PROVIDER:  Attending Name and NPI#: Sarah Mendoza Md [[id number]]  Address: [address] [place] [address]  Phone: [phone]     ADMISSION INFORMATION:  Place of Service: Inpatient [facility]  Place of Service Code: 21  Inpatient Admission Date/Time: 4/15/25  7:53 PM  Discharge Date/Time: 2025 12:01 PM  Admitting Diagnosis Code/Description:  Encounter for induction of labor [Z34.90]   Mother: Zee Bay 1990 Estimated Date of Delivery: 25  Delivering clinician: Sarah Mendoza   OB History          3    Para   2    Term   2       0    AB   1    Living   2         SAB   1    IAB   0    Ectopic   0    Multiple   0    Live Births   2               [employer] Name & MRN:   Information for the patient's :  Shanique, Baby Boy (Zee) [[id number]]   [employer] Delivery Information:  Sex: male  Delivered 2025 5:55 AM by Vaginal, Spontaneous; Gestational Age: 38w6d     Measurements:  Weight: 7 lb 7.9 oz (3400 g);  Height: 20\"    APGAR 1 minute 5 minutes 10 minutes   Totals: 8 9       UTILIZATION REVIEW CONTACT:  Joycelyn Costello, Utilization   Network Utilization Review Department  Phone: [phone]  Fax [phone]  Email: [email or web address]  Contact for approvals/pending authorizations, clinical reviews, and discharge.   PHYSICIAN ADVISORY SERVICES:  Medical Necessity Denial & [facility]atz-pg-Bx[employer] Review  Phone: [phone]  Fax: [phone]  Email: [email or web address]   DISCHARGE SUPPORT TEAM:  For Patients Discharge Needs & Updates  Phone: [phone] opt. 2 Fax: [phone]  Email: [email or web address] "

## 2025-04-18 NOTE — TELEPHONE ENCOUNTER
Who called:STAFF     Is the patient Pregnant ?no   If so, How many weeks?     Reason for the Call:schedule her BP check and post partum    Action Taken: scheduled

## 2025-04-18 NOTE — LACTATION NOTE
This note was copied from a baby's chart.  CONSULT - LACTATION  Baby Boy Bay (Mindy) 2 days male MRN: [id number]    [facility] hospital NURSERY Room / Bed: (N)/(N) Encounter: [id number]    Maternal Information     MOTHER:  Zee Bay  Maternal Age: 34 y.o.  OB History: # 1 - Date: 22, Sex: Male, Weight: 3360 g (7 lb 6.5 oz), GA: 38w0d, Type: Vaginal, Spontaneous, Apgar1: 8, Apgar5: 9, Living: Living, Birth Comments: None    # 2 - Date: 24, Sex: None, Weight: None, GA: 6w0d, Type: Spontaneous , Apgar1: None, Apgar5: None, Living: None, Birth Comments: None    # 3 - Date: 25, Sex: Male, Weight: 3400 g (7 lb 7.9 oz), GA: 38w6d, Type: Vaginal, Spontaneous, Apgar1: 8, Apgar5: 9, Living: Living, Birth Comments: None   Previous breast reduction surgery? No    Lactation history:   Has patient previously breast fed: Yes   How long had patient previously breast fed: 3-4 months   Previous breast feeding complications: Other (Comment) (Combo fed due to baby being jaundice)     Past Surgical History:   Procedure Laterality Date    KNEE SURGERY Right     AL LAPAROSCOPY SURG CHOLECYSTECTOMY N/A 12/15/2021    Procedure: CHOLECYSTECTOMY LAPAROSCOPIC W/ ROBOTICS;  Surgeon: Shannen New MD;  Location: [facility] OR;  Service: General    WISDOM TOOTH EXTRACTION         Birth information:  YOB: 2024   Time of birth: 5:55 AM   Sex: male   Delivery type: Vaginal, Spontaneous   Birth Weight: 3400 g (7 lb 7.9 oz)   Percent of Weight Change: -6%     Gestational Age: 38w6d   [unfilled]    Reason for Consult:    Reason for Consult: Discharge (ext) - 20 min, Breast/Nipple Pain - 5 min    Risk Factors:    Risk Factors:  (IDM, on glucose protocol)    Breast and nipple assessment:   Breasts/Nipples  Left Breast: Soft  Right Breast: Soft  Left Nipple: Tender, Everted  Right Nipple: Cracked, Sore, Everted (compression stripe on nipple face from  12-6)  Intervention: Hand expression  Breastfeeding Progress: Not yet established    OB Lactation Tools:         Breast Pump:    Breast Pump  Pump: Personal (Spectra from previous pregnancy, Lansinoh Duo Discrete with this preganancy)       Assessment:  normal assessment, suck blisters noted on upper lip     Feeding assessment: feeding well  LATCH:  Latch: Grasps breast, tongue down, lips flanged, rhythmic sucking   Audible Swallowing: Spontaneous and intermittent (24 hours old)   Type of Nipple: Everted (After stimulation)   Comfort (Breast/Nipple): Filling, red/small blisters/bruises, mild/moderate discomfort   Hold (Positioning): No assist from staff, mother able to position/hold infant   LATCH Score: 9         Feeding recommendations:  breast feed on demand  DC Consult: Met with parents to discuss feeding plan. Mom reports baby cluster fed throughout the night. Zee c/o of nipple pain on right nipple. Compression stripe from 12 oclock to 6 oclock. Baby having adeq wet/dirty diapers daily.     Zee latched Elisabeth in football position on left breast. Baby actively sucking with swallows. Reviewed with parents proper position and alignment. Elisabeth curling top lip inward, education on how to manually flange top lip.   Zee transitioned Elisabeth onto right breast in football hold. Elisabeth latched deeply with active sucks and swallows heard. Zee holding U shape hold of breast, removes hand and feels some pain. Encouraged a rolled towel or blanket underneath breast to support breast or continue to use hand to support breast. She notices a difference with rolled towel.Education on supporting breast and baby.   Reviewed wet wound care to help heal nipples, encouraged to use in between feedings.   Reviewed with parents initial engorgement, and what to expect in next few days. Reviewed babies bellies and milk amounts. Encouraged to keep putting Elisabeth to breast every feeding. Parents verbalized understanding. Enc to call for  further assistance.     Feeding plan:  Family is encouraged to breastfeed on demand, every 2-3 hours or when baby showing early feeding cues. Reviewed to offer both breasts during a feed.  Discussed the importance of ensuring that baby feeds 8-12x in 24 hours and not waiting over 3 hours to feed. Educated to watch baby for hunger and fullness cues.    Education:   Demonstration and teach back of deeper latch with baby deeper into mom's axilla and baby's chest against the breast. Alignment of ear, shoulder, hip and tucked into mom's arm. Alignment of nipple to nose, once wide mouth is achieved, snug hold between the upper shoulders. Take baby to breast, not breast to baby. Active, coordinated sucking achieved. Ed. On breathing and muscle breaks. Ed. On breast compressions, timing of feeds and signs of satiation.      Discussed s/s engorgement, blocked milk ducts, and mastitis. Discussed how to remedy at home and when to contact physician. Reviewed engorgement and ways to reduce symptoms. Use a warmth on breasts with massage prior to feeding / pumping. Use massage during pumping over full ducts. Used cold, compression on breasts after feeding/pumping session. Ideas are rice in a sock. Place one in the freezer for cool and one in the microwave for warmth. Referred to discharge book / engorgement page.    C/O sore nipples.  Information given about sore nipples and how to correct with positioning techniques. Discussed maneuvers to latch infant on properly to avoid nipple pain and promote healing.  Discussed treatments that could be utilized to promote healing.  Encouraged parents to call for assistance, questions, and concerns about breastfeeding.  Extension provided.  Sharmin English MA, IBCLC 4/18/2025 9:28 AM

## 2025-04-18 NOTE — PLAN OF CARE
Problem: POSTPARTUM  Goal: Experiences normal postpartum course  Description: INTERVENTIONS:- Monitor maternal vital signs- Assess uterine involution and lochia  Outcome: Progressing     Problem: POSTPARTUM  Goal: Appropriate maternal -  bonding  Description: INTERVENTIONS:- Identify family support- Assess for appropriate maternal/infant bonding -Encourage maternal/infant bonding opportunities- Referral to  or  as needed  Outcome: Progressing     Problem: POSTPARTUM  Goal: Establishment of infant feeding pattern  Description: INTERVENTIONS:- Assess breast/bottle feeding- Refer to lactation as needed  Outcome: Progressing

## 2025-04-18 NOTE — TELEPHONE ENCOUNTER
Patient needs  bp check and a pp visit. Delivered 4/16 vaginally. Fist appointment would be June 6th for PP.

## 2025-04-19 NOTE — TELEPHONE ENCOUNTER
Patient on home BP monitoring. Postpartum day 2.  Medium alert @ 1949 for /101, TX 77.    Attempted to reach patient. Left voicemail requesting patient retake vitals. Instructed patient to take evening dose of labetalol if she has not yet done so, retake BP 30-60 minutes after medication, and call [facility] if experiencing any symptoms such as headache, vision changes, or abdominal pain. Awaiting repeat vitals at this time.

## 2025-04-21 PROCEDURE — 88307 TISSUE EXAM BY PATHOLOGIST: CPT | Performed by: PATHOLOGY

## 2025-04-22 NOTE — PROGRESS NOTES
Today's Date: 4/22/2025  Pt's Preferred Lab: None Specified  Ambulatory Order    Lab Ordered: 2hr (75GM) GTT   Reason: to screen for T2DM s/p delivery r/t H/O GDM  Time-Frame to be collected: 4-12 weeks postpartum    Patient Instructions: Fasting = Do NOT eat or drink anything except WATER for at least 8 hours before the test.    *For [facility], please call [phone] to schedule.   *To request lab be sent to alternative lab including [facility] or [facility], Call: [phone] or Message Diabetes Team via [employer] Message to OPHELIA Baez RD   Diabetes Educator   [facility] - Maternal Fetal Medicine  Diabetes and Pregnancy Program

## 2025-04-23 ENCOUNTER — CLINICAL SUPPORT (OUTPATIENT)
Dept: OBGYN CLINIC | Facility: MEDICAL CENTER | Age: 35
End: 2025-04-23

## 2025-04-23 DIAGNOSIS — Z01.30 BP CHECK: Primary | ICD-10-CM

## 2025-04-23 NOTE — PROGRESS NOTES
Pt here for BP check   BP was 130/80   No issues or concerns at this time  Denies headaches, visual changes, light headedness, dizzyness   Pt is scheduled for PP visit and will continue to monitor BP at home and call us if she has any issues/concerns.

## 2025-05-01 ENCOUNTER — TELEPHONE (OUTPATIENT)
Dept: OTHER | Facility: HOSPITAL | Age: 35
End: 2025-05-01

## 2025-05-02 NOTE — TELEPHONE ENCOUNTER
HIGH ALERT for low BP    Pt submitted BP 89/66 and WV 83 at 2019. Pt contacted. Pt denied SOB, Chest pain, HA, Palpitations, Dizziness, and/or Blurry Vision. Pt asked to resubmit BP and HR in 15-20 minutes. Pt resubmitted /74 and WV 85 at 2041.

## 2025-05-09 ENCOUNTER — TELEPHONE (OUTPATIENT)
Dept: PERINATAL CARE | Facility: OTHER | Age: 35
End: 2025-05-09

## 2025-05-09 NOTE — TELEPHONE ENCOUNTER
Blood pressure monitoring program.  Zee had an alert at 1114 for a blood pressure of 87/64 with a heart rate of 84.  She denies palpitations, syncope, lightheadedness or any other concerns.  She is taking labetalol 100 mg twice a day.  She did take her labetalol this morning.  Message sent to OB to reach out to her to discuss whether or not she should continue her blood pressure medication or be seen in the office.  Alert cleared.      1339 repeat /73 with HR of 82

## 2025-05-09 NOTE — TELEPHONE ENCOUNTER
Per provider, patient to stop her blood pressure medication due to low BP readings. Patient made aware and verbalizes understanding. Patient advised to keep reporting BP readings twice a day and to call office with any questions or concerns.

## 2025-05-19 ENCOUNTER — TELEPHONE (OUTPATIENT)
Dept: OBGYN CLINIC | Facility: MEDICAL CENTER | Age: 35
End: 2025-05-19

## 2025-05-19 NOTE — TELEPHONE ENCOUNTER
Patient has been noncompliant in BP monitoring program.  Has not submitted blood pressure readings since .  Called to remind patient to submit readings.  Patient reports has been submitting her readings as directed but keep getting an error message. BP this mornin/80 and HR: 83. [facility] team made aware. Per [facility] team [place] phone number provided via [employer] message.

## 2025-05-21 NOTE — TELEPHONE ENCOUNTER
Patient has been noncompliant in BP monitoring program.  Has not submitted blood pressure readings since 5/17/2025.  Called to remind patient to submit readings.  Spoke to patient.  Reports she contacted [employer] and reset soham.  States last BP taken shows it was received.

## 2025-05-23 PROBLEM — E66.01 SEVERE OBESITY DUE TO EXCESS CALORIES AFFECTING PREGNANCY IN THIRD TRIMESTER (HCC): Status: RESOLVED | Noted: 2024-10-17 | Resolved: 2025-05-23

## 2025-05-23 PROBLEM — Z86.32 HISTORY OF GESTATIONAL DIABETES IN PRIOR PREGNANCY, CURRENTLY PREGNANT IN SECOND TRIMESTER: Status: RESOLVED | Noted: 2022-07-25 | Resolved: 2025-05-23

## 2025-05-23 PROBLEM — O24.419 GESTATIONAL DIABETES MELLITUS (GDM) IN THIRD TRIMESTER: Status: RESOLVED | Noted: 2025-02-06 | Resolved: 2025-05-23

## 2025-05-23 PROBLEM — O24.410 GDM, CLASS A1: Status: RESOLVED | Noted: 2025-01-28 | Resolved: 2025-05-23

## 2025-05-23 PROBLEM — O99.810 ABNORMAL GLUCOSE TOLERANCE TEST (GTT) DURING PREGNANCY, ANTEPARTUM: Status: RESOLVED | Noted: 2024-10-17 | Resolved: 2025-05-23

## 2025-05-23 PROBLEM — O09.292 HISTORY OF GESTATIONAL DIABETES IN PRIOR PREGNANCY, CURRENTLY PREGNANT IN SECOND TRIMESTER: Status: RESOLVED | Noted: 2022-07-25 | Resolved: 2025-05-23

## 2025-05-23 PROBLEM — O99.213 SEVERE OBESITY DUE TO EXCESS CALORIES AFFECTING PREGNANCY IN THIRD TRIMESTER (HCC): Status: RESOLVED | Noted: 2024-10-17 | Resolved: 2025-05-23

## 2025-05-23 PROBLEM — O99.210 OBESITY IN PREGNANCY, ANTEPARTUM: Status: RESOLVED | Noted: 2025-03-27 | Resolved: 2025-05-23

## 2025-05-24 NOTE — PROGRESS NOTES
Post partum Visit        -25-  male - 7.79 pounds.     2.  Breast feeding - exclusive or supplementation    Breast pain or mastitis   Baby and me for lactation     3.  Post partum depression screening    Risk -    Support at home baby and me center    4.  Sex - not yet     5.  Contraception / future fertility - will start progesterone only pills while breast feeding     6.  Return to work - 12 weeks     7.  Weight    Starting -278   Delivery -282   Total gain - 4.3     Current - 280    8.  Annual    Pap 10/21/24- neg / neg    Next due - annual in .  CHTN - was stopped    Remove from the program

## 2025-05-28 ENCOUNTER — POSTPARTUM VISIT (OUTPATIENT)
Dept: OBGYN CLINIC | Facility: MEDICAL CENTER | Age: 35
End: 2025-05-28

## 2025-05-28 VITALS
HEIGHT: 67 IN | BODY MASS INDEX: 43.95 KG/M2 | SYSTOLIC BLOOD PRESSURE: 126 MMHG | DIASTOLIC BLOOD PRESSURE: 70 MMHG | WEIGHT: 280 LBS

## 2025-05-28 DIAGNOSIS — Z30.011 ENCOUNTER FOR INITIAL PRESCRIPTION OF CONTRACEPTIVE PILLS: ICD-10-CM

## 2025-05-28 PROCEDURE — 99024 POSTOP FOLLOW-UP VISIT: CPT | Performed by: OBSTETRICS & GYNECOLOGY

## 2025-05-28 RX ORDER — ACETAMINOPHEN AND CODEINE PHOSPHATE 120; 12 MG/5ML; MG/5ML
1 SOLUTION ORAL DAILY
Qty: 28 TABLET | Refills: 3 | Status: SHIPPED | OUTPATIENT
Start: 2025-05-28

## 2025-05-29 ENCOUNTER — TELEPHONE (OUTPATIENT)
Dept: OBGYN CLINIC | Facility: MEDICAL CENTER | Age: 35
End: 2025-05-29

## 2025-05-29 NOTE — TELEPHONE ENCOUNTER
[facility] team notified. Patient has been removed from the OB Postpartum Blood Pressure Monitoring Program.    ----- Message from Josue De La Vega MD sent at 5/28/2025  5:21 PM EDT -----  Remove from the BP program Aaa

## 2025-06-26 ENCOUNTER — APPOINTMENT (OUTPATIENT)
Dept: LAB | Facility: HOSPITAL | Age: 35
End: 2025-06-26

## 2025-06-26 ENCOUNTER — APPOINTMENT (OUTPATIENT)
Dept: LAB | Facility: HOSPITAL | Age: 35
End: 2025-06-26
Payer: COMMERCIAL

## 2025-06-26 DIAGNOSIS — O24.410 DIET CONTROLLED GESTATIONAL DIABETES MELLITUS (GDM) IN THIRD TRIMESTER: ICD-10-CM

## 2025-06-26 DIAGNOSIS — Z00.8 ENCOUNTER FOR OTHER GENERAL EXAMINATION: ICD-10-CM

## 2025-06-26 LAB
CHOLEST SERPL-MCNC: 235 MG/DL (ref ?–200)
EST. AVERAGE GLUCOSE BLD GHB EST-MCNC: 117 MG/DL
GLUCOSE 2H P 75 G GLC PO SERPL-MCNC: 104 MG/DL (ref 70–139)
GLUCOSE P FAST SERPL-MCNC: 89 MG/DL (ref 65–99)
HBA1C MFR BLD: 5.7 %
HDLC SERPL-MCNC: 67 MG/DL
LDLC SERPL CALC-MCNC: 144 MG/DL (ref 0–100)
NONHDLC SERPL-MCNC: 168 MG/DL
TRIGL SERPL-MCNC: 122 MG/DL (ref ?–150)

## 2025-06-26 PROCEDURE — 82947 ASSAY GLUCOSE BLOOD QUANT: CPT

## 2025-06-26 PROCEDURE — 83036 HEMOGLOBIN GLYCOSYLATED A1C: CPT

## 2025-06-26 PROCEDURE — 36415 COLL VENOUS BLD VENIPUNCTURE: CPT

## 2025-06-26 PROCEDURE — 82950 GLUCOSE TEST: CPT

## 2025-06-26 PROCEDURE — 80061 LIPID PANEL: CPT

## 2025-06-27 ENCOUNTER — OFFICE VISIT (OUTPATIENT)
Dept: FAMILY MEDICINE CLINIC | Facility: CLINIC | Age: 35
End: 2025-06-27
Payer: COMMERCIAL

## 2025-06-27 VITALS
RESPIRATION RATE: 16 BRPM | HEIGHT: 67 IN | HEART RATE: 94 BPM | OXYGEN SATURATION: 98 % | BODY MASS INDEX: 44.13 KG/M2 | DIASTOLIC BLOOD PRESSURE: 68 MMHG | SYSTOLIC BLOOD PRESSURE: 118 MMHG | WEIGHT: 281.2 LBS

## 2025-06-27 DIAGNOSIS — Z00.00 ANNUAL PHYSICAL EXAM: Primary | ICD-10-CM

## 2025-06-27 DIAGNOSIS — O10.919 CHRONIC HYPERTENSION IN PREGNANCY: ICD-10-CM

## 2025-06-27 PROBLEM — Z92.29 COVID-19 VACCINE SERIES COMPLETED: Status: RESOLVED | Noted: 2022-04-03 | Resolved: 2025-06-27

## 2025-06-27 PROCEDURE — 99395 PREV VISIT EST AGE 18-39: CPT | Performed by: NURSE PRACTITIONER

## 2025-06-27 NOTE — PATIENT INSTRUCTIONS
"Patient Education     Routine physical for adults   The Basics   Written by the doctors and editors at [facility]   What is a physical? -- A physical is a routine visit, or \"check-up,\" with your doctor. You might also hear it called a \"wellness visit\" or \"preventive visit.\"  During each visit, the doctor will:   Ask about your physical and mental health   Ask about your habits, behaviors, and lifestyle   Do an exam   Give you vaccines if needed   Talk to you about any medicines you take   Give advice about your health   Answer your questions  Getting regular check-ups is an important part of taking care of your health. It can help your doctor find and treat any problems you have. But it's also important for preventing health problems.  A routine physical is different from a \"sick visit.\" A sick visit is when you see a doctor because of a health concern or problem. Since physicals are scheduled ahead of time, you can think about what you want to ask the doctor.  How often should I get a physical? -- It depends on your age and health. In general, for people age 21 years and older:   If you are younger than 50 years, you might be able to get a physical every 3 years.   If you are 50 years or older, your doctor might recommend a physical every year.  If you have an ongoing health condition, like diabetes or high blood pressure, your doctor will probably want to see you more often.  What happens during a physical? -- In general, each visit will include:   Physical exam - The doctor or nurse will check your height, weight, heart rate, and blood pressure. They will also look at your eyes and ears. They will ask about how you are feeling and whether you have any symptoms that bother you.   Medicines - It's a good idea to bring a list of all the medicines you take to each doctor visit. Your doctor will talk to you about your medicines and answer any questions. Tell them if you are having any side effects that bother you. You " "should also tell them if you are having trouble paying for any of your medicines.   Habits and behaviors - This includes:   Your diet   Your exercise habits   Whether you smoke, drink alcohol, or use drugs   Whether you are sexually active   Whether you feel safe at home  Your doctor will talk to you about things you can do to improve your health and lower your risk of health problems. They will also offer help and support. For example, if you want to quit smoking, they can give you advice and might prescribe medicines. If you want to improve your diet or get more physical activity, they can help you with this, too.   Lab tests, if needed - The tests you get will depend on your age and situation. For example, your doctor might want to check your:   Cholesterol   Blood sugar   Iron level   Vaccines - The recommended vaccines will depend on your age, health, and what vaccines you already had. Vaccines are very important because they can prevent certain serious or deadly infections.   Discussion of screening - \"Screening\" means checking for diseases or other health problems before they cause symptoms. Your doctor can recommend screening based on your age, risk, and preferences. This might include tests to check for:   Cancer, such as breast, prostate, cervical, ovarian, colorectal, prostate, lung, or skin cancer   Sexually transmitted infections, such as chlamydia and gonorrhea   Mental health conditions like depression and anxiety  Your doctor will talk to you about the different types of screening tests. They can help you decide which screenings to have. They can also explain what the results might mean.   Answering questions - The physical is a good time to ask the doctor or nurse questions about your health. If needed, they can refer you to other doctors or specialists, too.  Adults older than 65 years often need other care, too. As you get older, your doctor will talk to you about:   How to prevent falling at " home   Hearing or vision tests   Memory testing   How to take your medicines safely   Making sure that you have the help and support you need at home  All topics are updated as new evidence becomes available and our peer review process is complete.  This topic retrieved from [employer] on: May 02, 2024.  Topic 802532 Version 1.0  Release: 32.4.3 - C32.122  © 2024 UpToDate, Inc. and/or its affiliates. All rights reserved.  Consumer Information Use and Disclaimer   Disclaimer: This generalized information is a limited summary of diagnosis, treatment, and/or medication information. It is not meant to be comprehensive and should be used as a tool to help the user understand and/or assess potential diagnostic and treatment options. It does NOT include all information about conditions, treatments, medications, side effects, or risks that may apply to a specific patient. It is not intended to be medical advice or a substitute for the medical advice, diagnosis, or treatment of a health care provider based on the health care provider's examination and assessment of a patient's specific and unique circumstances. Patients must speak with a health care provider for complete information about their health, medical questions, and treatment options, including any risks or benefits regarding use of medications. This information does not endorse any treatments or medications as safe, effective, or approved for treating a specific patient. UpToDate, Inc. and its affiliates disclaim any warranty or liability relating to this information or the use thereof.The use of this information is governed by the Terms of Use, available at https://www.woltersProducteevuwer.com/en/know/clinical-effectiveness-terms. 2024© UpToDate, Inc. and its affiliates and/or licensors. All rights reserved.  Copyright   © 2024 UpToDate, Inc. and/or its affiliates. All rights reserved.

## 2025-06-27 NOTE — PROGRESS NOTES
Adult Annual Physical  Name: Zee Bay      : 1990      MRN: [id number]  Encounter Provider: OPHELIA Smith  Encounter Date: 2025   Encounter department: [facility]    :  Assessment & Plan  Annual physical exam         Chronic hypertension in pregnancy         Chronic hypertension in pregnancy         Annual physical exam               Preventive Screenings:    - Cervical cancer screening: screening up-to-date     Immunizations:  - Immunizations due: Prevnar 20         History of Present Illness     Adult Annual Physical:  Patient presents for annual physical. Here for a physical - reports no new concerns or issues - recent pregnancy - delivery the end of April. She was with high blood pressure through part of the pregnancy - was on medication but then began having low BP's and needed to stop the medication - she remained normotensive through the rest of the pregnancy.  Her Glucose was also elevated during the pregnancy but her repeat postpartum glucose test was normal.    She is currently breastfeeding.  She is on an oral contraceptive, she has not yet gotten  her menses postpartum. .     Diet and Physical Activity:  - Diet/Nutrition: no special diet.  - Exercise: no formal exercise.    Depression Screening:  - PHQ-2 Score: 0    General Health:  - Sleep: sleeps well.  - Hearing: normal hearing bilateral ears.  - Vision: wears glasses.  - Dental: regular dental visits and brushes teeth twice daily.    /GYN Health:  - Follows with GYN: yes.   - History of STDs: no  - Contraception: oral contraceptives.      Advanced Care Planning:  - Has an advanced directive?: no    - Has a durable medical POA?: no    - ACP document given to patient?: no      Review of Systems   Constitutional: Negative.    HENT: Negative.     Respiratory: Negative.     Cardiovascular: Negative.    Gastrointestinal: Negative.    Neurological: Negative.    All other systems reviewed and are  "negative.        Objective   /68 (BP Location: Left arm, Patient Position: Sitting, Cuff Size: Standard)   Pulse 94   Resp 16   Ht 5' 7\" (1.702 m)   Wt 128 kg (281 lb 3.2 oz)   SpO2 98%   Breastfeeding Yes   BMI 44.04 kg/m²     Physical Exam  Constitutional:       Appearance: Normal appearance.   Pulmonary:      Effort: Pulmonary effort is normal.      Breath sounds: Normal breath sounds.     Neurological:      Mental Status: She is alert.         "

## 2025-06-27 NOTE — PROGRESS NOTES
"Adult Annual Physical  Name: Zee Bay      : 1990      MRN: [id number]  Encounter Provider: OPHELIA Smith  Encounter Date: 2025   Encounter department: [facility]    :  Assessment & Plan        Preventive Screenings:    - Cervical cancer screening: screening up-to-date     Immunizations:  - Immunizations due: Prevnar 20         History of Present Illness     Adult Annual Physical:  Patient presents for annual physical.     Diet and Physical Activity:  - Diet/Nutrition: no special diet.  - Exercise: no formal exercise.    Depression Screening:  - PHQ-2 Score: 0    General Health:  - Sleep: sleeps well.  - Hearing: normal hearing bilateral ears.  - Vision: wears glasses.  - Dental: regular dental visits and brushes teeth twice daily.    /GYN Health:  - Follows with GYN: yes.   - History of STDs: no  - Contraception: oral contraceptives.      Advanced Care Planning:  - Has an advanced directive?: no    - Has a durable medical POA?: no      Review of Systems      Objective   Resp 16   Ht 5' 7\" (1.702 m)   BMI 43.85 kg/m²     Physical Exam    "

## 2025-08-01 DIAGNOSIS — Z30.011 ENCOUNTER FOR INITIAL PRESCRIPTION OF CONTRACEPTIVE PILLS: ICD-10-CM

## 2025-08-01 RX ORDER — ACETAMINOPHEN AND CODEINE PHOSPHATE 120; 12 MG/5ML; MG/5ML
1 SOLUTION ORAL DAILY
Qty: 84 TABLET | Refills: 0 | Status: SHIPPED | OUTPATIENT
Start: 2025-08-01

## (undated) DEVICE — HEM-O-LOK CLIP CARTRIDGE MED/LARGE DA VINCI SI/XI

## (undated) DEVICE — COLUMN DRAPE

## (undated) DEVICE — TELFA NON-ADHERENT ABSORBENT DRESSING: Brand: TELFA

## (undated) DEVICE — MAYO STAND COVER: Brand: CONVERTORS

## (undated) DEVICE — DRAPE EQUIPMENT RF WAND

## (undated) DEVICE — 3M™ STERI-STRIP™ REINFORCED ADHESIVE SKIN CLOSURES, R1547, 1/2 IN X 4 IN (12 MM X 100 MM), 6 STRIPS/ENVELOPE: Brand: 3M™ STERI-STRIP™

## (undated) DEVICE — MEDIUM-LARGE CLIP APPLIER: Brand: ENDOWRIST

## (undated) DEVICE — PROGRASP FORCEPS: Brand: ENDOWRIST

## (undated) DEVICE — PMI DISPOSABLE PUNCTURE CLOSURE DEVICE / SUTURE GRASPER: Brand: PMI

## (undated) DEVICE — ALLENTOWN LAP CHOLE APP PACK: Brand: CARDINAL HEALTH

## (undated) DEVICE — TISSUE RETRIEVAL SYSTEM: Brand: INZII RETRIEVAL SYSTEM

## (undated) DEVICE — TUBE SET SMOKE EVAC PNEUMOCLEAR HIGH FLOW

## (undated) DEVICE — ARM DRAPE

## (undated) DEVICE — 3000CC GUARDIAN II: Brand: GUARDIAN

## (undated) DEVICE — NEEDLE HYPO 22G X 1-1/2 IN

## (undated) DEVICE — INTENDED FOR TISSUE SEPARATION, AND OTHER PROCEDURES THAT REQUIRE A SHARP SURGICAL BLADE TO PUNCTURE OR CUT.: Brand: BARD-PARKER SAFETY BLADES SIZE 15, STERILE

## (undated) DEVICE — 3M™ STERI-STRIP™ COMPOUND BENZOIN TINCTURE 40 BAGS/CARTON 4 CARTONS/CASE C1544: Brand: 3M™ STERI-STRIP™

## (undated) DEVICE — CHLORAPREP HI-LITE 26ML ORANGE

## (undated) DEVICE — GLOVE SRG BIOGEL 6.5

## (undated) DEVICE — SUT PDS II 1 CT-1 27 IN Z347H

## (undated) DEVICE — IRRIG ENDO FLO TUBING

## (undated) DEVICE — SUT MONOCRYL 4-0 PS-2 27 IN Y426H

## (undated) DEVICE — BLADELESS OBTURATOR: Brand: WECK VISTA

## (undated) DEVICE — CANNULA SEAL

## (undated) DEVICE — PERMANENT CAUTERY HOOK: Brand: ENDOWRIST

## (undated) DEVICE — TROCAR: Brand: KII FIOS FIRST ENTRY

## (undated) DEVICE — TUBING SMOKE EVAC W/FILTRATION DEVICE PLUMEPORT ACTIV

## (undated) DEVICE — PLUMEPEN PRO 10FT

## (undated) DEVICE — GLOVE INDICATOR PI UNDERGLOVE SZ 6.5 BLUE

## (undated) DEVICE — ASTOUND STANDARD SURGICAL GOWN, XL: Brand: CONVERTORS